# Patient Record
Sex: FEMALE | Race: WHITE | NOT HISPANIC OR LATINO | Employment: OTHER | URBAN - NONMETROPOLITAN AREA
[De-identification: names, ages, dates, MRNs, and addresses within clinical notes are randomized per-mention and may not be internally consistent; named-entity substitution may affect disease eponyms.]

---

## 2017-07-11 ENCOUNTER — OFFICE VISIT (OUTPATIENT)
Dept: OBSTETRICS AND GYNECOLOGY | Facility: CLINIC | Age: 67
End: 2017-07-11

## 2017-07-11 VITALS
WEIGHT: 176 LBS | DIASTOLIC BLOOD PRESSURE: 90 MMHG | SYSTOLIC BLOOD PRESSURE: 142 MMHG | BODY MASS INDEX: 34.55 KG/M2 | HEIGHT: 60 IN

## 2017-07-11 DIAGNOSIS — Z78.9 NONSMOKER: ICD-10-CM

## 2017-07-11 DIAGNOSIS — N30.01 ACUTE CYSTITIS WITH HEMATURIA: ICD-10-CM

## 2017-07-11 DIAGNOSIS — N95.2 VAGINAL ATROPHY: ICD-10-CM

## 2017-07-11 DIAGNOSIS — N90.4 LICHEN SCLEROSUS ET ATROPHICUS OF THE VULVA: Primary | ICD-10-CM

## 2017-07-11 PROCEDURE — 99203 OFFICE O/P NEW LOW 30 MIN: CPT | Performed by: OBSTETRICS & GYNECOLOGY

## 2017-07-11 RX ORDER — CLOBETASOL PROPIONATE 0.5 MG/G
CREAM TOPICAL 2 TIMES DAILY
Qty: 30 G | Refills: 1 | Status: SHIPPED | OUTPATIENT
Start: 2017-07-11 | End: 2021-06-17

## 2017-07-11 NOTE — PROGRESS NOTES
"[facility]  Pauline Molina  : 1950  MRN: [id number]  CSN: [id number]    History and Physical    Subjective   Pauline Molina is a 67 y.o. year old  who presents for consultation about surgery due to PMB.  The patient reports that she started noting \"spots of blood\" intermittently several months ago, but was never really sure where it was coming from.  She also reports a brown discharge that had gotten bothersome, but lately has been better.  Patient recalls a crampy feeling like when she had periods during the bleeding and discharge.  Pt does have bladder leakage that has gotten worse every year; leakage occurs even without warning.  She does have leakage with coughing or sneezing, and also has urge incontinence - reporting that she often has accidents at home.    Past Medical History:   Diagnosis Date   • Diabetes mellitus    • Hypertension    • Pseudotumor cerebri      Past Surgical History:   Procedure Laterality Date   •  SECTION     • CHOLECYSTECTOMY     • HERNIA REPAIR     • HYSTERECTOMY     • OOPHORECTOMY       Smoking status: Never Smoker                                                                 Smokeless status: Not on file                         Current Outpatient Prescriptions:   •  AMLODIPINE BESYLATE PO, Take  by mouth Daily., Disp: , Rfl:   •  losartan (COZAAR) 100 MG tablet, Take 100 mg by mouth daily., Disp: , Rfl:   •  metFORMIN (GLUCOPHAGE) 500 MG tablet, Take 500 mg by mouth 2 times daily (with meals)., Disp: , Rfl:   •  metoprolol tartrate (LOPRESSOR) 50 MG tablet, Take 50 mg by mouth 2 times daily., Disp: , Rfl:     No Known Allergies    Family History   Problem Relation Age of Onset   • Prostate cancer Father      Review of Systems   Respiratory: Negative for shortness of breath.    Cardiovascular: Negative for chest pain.   Gastrointestinal: Negative for abdominal pain, constipation and diarrhea.   Endocrine: Negative for cold intolerance and heat intolerance. " "        Objective   /90  Ht 60\" (152.4 cm)  Wt 176 lb (79.8 kg)  BMI 34.37 kg/m2    Physical Exam   Physical Exam   Constitutional: She is oriented to person, place, and time. She appears well-developed and well-nourished. No distress.   HENT:   Head: Normocephalic and atraumatic.   Neck: Normal range of motion.   Pulmonary/Chest: Effort normal.   Abdominal: Soft. There is no tenderness.   Genitourinary: Vagina normal. Pelvic exam was performed with patient supine. There is no tenderness or lesion on the right labia. There is no tenderness or lesion on the left labia. No tenderness or bleeding in the vagina. No signs of injury around the vagina. No vaginal discharge found.   Genitourinary Comments: Patient with mild discharge.  Good vault support, grade III cystocele, grade II rectocele.  Bimanual exam limited by body habitus.  External labial irritation due to pad usage vs. Lichen sclerosus.  Anus unremarkable.   Musculoskeletal: Normal range of motion.   Neurological: She is alert and oriented to person, place, and time.   Skin: Skin is warm and dry.   Psychiatric: She has a normal mood and affect. Her behavior is normal.   Nursing note and vitals reviewed.      Labs  Lab Results   Component Value Date     10/13/2016    HGB 15.3 10/13/2016    HCT 42.7 10/13/2016    WBC 6.20 10/13/2016     10/13/2016    K 4.5 10/13/2016    CL 99 10/13/2016    CO2 30.0 10/13/2016    BUN 19 10/13/2016    CREATININE 0.76 10/13/2016    GLUCOSE 162 (H) 10/13/2016    ALBUMIN 4.50 10/13/2016    CALCIUM 9.7 10/13/2016    AST 26 10/13/2016    ALT 27 10/13/2016    BILITOT 0.8 10/13/2016        Assessment & Plan  Pauline was seen today for vaginal bleeding.    Diagnoses and all orders for this visit:    Lichen sclerosus et atrophicus of the vulva: Empiric treatment for lichen sclerosus   -     clobetasol (TEMOVATE) 0.05 % cream; Apply  topically 2 (Two) Times a Day. Apply sparingly to affected area two nights each week, " external only    Vaginal atrophy: Patient sexually active only occasionally, but feel like brownish or dark discharge likely due to atrophy/mild trauma if they do have intercourse.  Scant today.  Also ruling out UTI as a possible cause.  Recent colonoscopy normal so not worried about rectal bleeding being a contributing factor.   -     conjugated estrogens (PREMARIN) 0.625 MG/GM vaginal cream; Insert  into the vagina Daily. 1/2 gram per vagina two nights weekly    Nonsmoker    BMI 34.0-34.9,adult    Acute cystitis with hematuria: UA just to rule out UTI as source of any bleeding.  Patient has had a very recent colonoscopy so not worried about rectal bleeding contributing to any brownish or dark discharge.  -     Urinalysis With / Microscopic If Indicated  -     Urine Culture      There are no diagnoses linked to this encounter.    Taniya Tomlinson MD  7/11/2017  10:18 AM

## 2017-07-13 LAB
APPEARANCE UR: CLEAR
BACTERIA UR CULT: NO GROWTH
BACTERIA UR CULT: NORMAL
BILIRUB UR QL STRIP: NEGATIVE
COLOR UR: YELLOW
GLUCOSE UR QL: NEGATIVE
HGB UR QL STRIP: NEGATIVE
KETONES UR QL STRIP: NEGATIVE
LEUKOCYTE ESTERASE UR QL STRIP: NEGATIVE
NITRITE UR QL STRIP: NEGATIVE
PH UR STRIP: 7 [PH] (ref 5–8)
PROT UR QL STRIP: NEGATIVE
SP GR UR: 1.01 (ref 1–1.03)
UROBILINOGEN UR STRIP-MCNC: NORMAL MG/DL

## 2018-06-13 ENCOUNTER — OFFICE VISIT (OUTPATIENT)
Dept: URGENT CARE | Age: 68
End: 2018-06-13
Payer: MEDICAID

## 2018-06-13 VITALS
WEIGHT: 181.4 LBS | TEMPERATURE: 98.2 F | BODY MASS INDEX: 35.61 KG/M2 | RESPIRATION RATE: 20 BRPM | HEIGHT: 60 IN | OXYGEN SATURATION: 96 % | DIASTOLIC BLOOD PRESSURE: 82 MMHG | SYSTOLIC BLOOD PRESSURE: 140 MMHG | HEART RATE: 71 BPM

## 2018-06-13 DIAGNOSIS — M25.511 ACUTE PAIN OF RIGHT SHOULDER: Primary | ICD-10-CM

## 2018-06-13 PROCEDURE — 1123F ACP DISCUSS/DSCN MKR DOCD: CPT | Performed by: NURSE PRACTITIONER

## 2018-06-13 PROCEDURE — G8417 CALC BMI ABV UP PARAM F/U: HCPCS | Performed by: NURSE PRACTITIONER

## 2018-06-13 PROCEDURE — 3017F COLORECTAL CA SCREEN DOC REV: CPT | Performed by: NURSE PRACTITIONER

## 2018-06-13 PROCEDURE — 1090F PRES/ABSN URINE INCON ASSESS: CPT | Performed by: NURSE PRACTITIONER

## 2018-06-13 PROCEDURE — G8399 PT W/DXA RESULTS DOCUMENT: HCPCS | Performed by: NURSE PRACTITIONER

## 2018-06-13 PROCEDURE — 1036F TOBACCO NON-USER: CPT | Performed by: NURSE PRACTITIONER

## 2018-06-13 PROCEDURE — 99213 OFFICE O/P EST LOW 20 MIN: CPT | Performed by: NURSE PRACTITIONER

## 2018-06-13 PROCEDURE — G8427 DOCREV CUR MEDS BY ELIG CLIN: HCPCS | Performed by: NURSE PRACTITIONER

## 2018-06-13 PROCEDURE — 4040F PNEUMOC VAC/ADMIN/RCVD: CPT | Performed by: NURSE PRACTITIONER

## 2018-06-13 RX ORDER — AMLODIPINE BESYLATE 5 MG/1
2.5 TABLET ORAL 2 TIMES DAILY
COMMUNITY
Start: 2018-05-07

## 2018-06-13 ASSESSMENT — ENCOUNTER SYMPTOMS
BACK PAIN: 0
RESPIRATORY NEGATIVE: 1

## 2019-01-10 ENCOUNTER — HOSPITAL ENCOUNTER (EMERGENCY)
Facility: HOSPITAL | Age: 69
Discharge: HOME | End: 2019-01-11
Attending: EMERGENCY MEDICINE | Admitting: EMERGENCY MEDICINE

## 2019-01-10 DIAGNOSIS — R00.2 PALPITATIONS: Primary | ICD-10-CM

## 2019-01-10 DIAGNOSIS — R07.9 CHEST PAIN, UNSPECIFIED TYPE: ICD-10-CM

## 2019-01-10 PROCEDURE — 85610 PROTHROMBIN TIME: CPT | Performed by: EMERGENCY MEDICINE

## 2019-01-10 PROCEDURE — 81001 URINALYSIS AUTO W/SCOPE: CPT | Performed by: EMERGENCY MEDICINE

## 2019-01-10 PROCEDURE — 99284 EMERGENCY DEPT VISIT MOD MDM: CPT

## 2019-01-10 PROCEDURE — 93005 ELECTROCARDIOGRAM TRACING: CPT | Performed by: EMERGENCY MEDICINE

## 2019-01-10 PROCEDURE — 36415 COLL VENOUS BLD VENIPUNCTURE: CPT

## 2019-01-10 PROCEDURE — 85025 COMPLETE CBC W/AUTO DIFF WBC: CPT | Performed by: EMERGENCY MEDICINE

## 2019-01-10 PROCEDURE — 93005 ELECTROCARDIOGRAM TRACING: CPT

## 2019-01-10 PROCEDURE — 80053 COMPREHEN METABOLIC PANEL: CPT | Performed by: EMERGENCY MEDICINE

## 2019-01-10 PROCEDURE — 83880 ASSAY OF NATRIURETIC PEPTIDE: CPT | Performed by: EMERGENCY MEDICINE

## 2019-01-10 PROCEDURE — 84484 ASSAY OF TROPONIN QUANT: CPT | Performed by: EMERGENCY MEDICINE

## 2019-01-10 PROCEDURE — 93010 ELECTROCARDIOGRAM REPORT: CPT | Performed by: INTERNAL MEDICINE

## 2019-01-11 ENCOUNTER — APPOINTMENT (OUTPATIENT)
Dept: GENERAL RADIOLOGY | Facility: HOSPITAL | Age: 69
End: 2019-01-11

## 2019-01-11 VITALS
OXYGEN SATURATION: 97 % | BODY MASS INDEX: 34.36 KG/M2 | WEIGHT: 175 LBS | RESPIRATION RATE: 17 BRPM | SYSTOLIC BLOOD PRESSURE: 140 MMHG | TEMPERATURE: 97.3 F | DIASTOLIC BLOOD PRESSURE: 80 MMHG | HEIGHT: 60 IN | HEART RATE: 70 BPM

## 2019-01-11 LAB
ALBUMIN SERPL-MCNC: 4.8 G/DL (ref 3.5–5)
ALBUMIN/GLOB SERPL: 1.7 G/DL (ref 1.1–2.5)
ALP SERPL-CCNC: 142 U/L (ref 24–120)
ALT SERPL W P-5'-P-CCNC: 34 U/L (ref 0–54)
ANION GAP SERPL CALCULATED.3IONS-SCNC: 14 MMOL/L (ref 4–13)
AST SERPL-CCNC: 27 U/L (ref 7–45)
BACTERIA UR QL AUTO: ABNORMAL /HPF
BASOPHILS # BLD AUTO: 0.04 10*3/MM3 (ref 0–0.2)
BASOPHILS NFR BLD AUTO: 0.5 % (ref 0–2)
BILIRUB SERPL-MCNC: 0.4 MG/DL (ref 0.1–1)
BILIRUB UR QL STRIP: NEGATIVE
BUN BLD-MCNC: 22 MG/DL (ref 5–21)
BUN/CREAT SERPL: 31 (ref 7–25)
CALCIUM SPEC-SCNC: 9.7 MG/DL (ref 8.4–10.4)
CHLORIDE SERPL-SCNC: 104 MMOL/L (ref 98–110)
CLARITY UR: CLEAR
CO2 SERPL-SCNC: 25 MMOL/L (ref 24–31)
COLOR UR: YELLOW
CREAT BLD-MCNC: 0.71 MG/DL (ref 0.5–1.4)
DEPRECATED RDW RBC AUTO: 39.8 FL (ref 40–54)
EOSINOPHIL # BLD AUTO: 0.16 10*3/MM3 (ref 0–0.7)
EOSINOPHIL NFR BLD AUTO: 2.1 % (ref 0–4)
ERYTHROCYTE [DISTWIDTH] IN BLOOD BY AUTOMATED COUNT: 12.2 % (ref 12–15)
GFR SERPL CREATININE-BSD FRML MDRD: 82 ML/MIN/1.73
GLOBULIN UR ELPH-MCNC: 2.9 GM/DL
GLUCOSE BLD-MCNC: 145 MG/DL (ref 70–100)
GLUCOSE UR STRIP-MCNC: NEGATIVE MG/DL
HCT VFR BLD AUTO: 45.3 % (ref 37–47)
HGB BLD-MCNC: 15.9 G/DL (ref 12–16)
HGB UR QL STRIP.AUTO: NEGATIVE
HOLD SPECIMEN: NORMAL
HOLD SPECIMEN: NORMAL
HYALINE CASTS UR QL AUTO: ABNORMAL /LPF
IMM GRANULOCYTES # BLD AUTO: 0.03 10*3/MM3 (ref 0–0.03)
IMM GRANULOCYTES NFR BLD AUTO: 0.4 % (ref 0–5)
INR PPP: 0.94 (ref 0.91–1.09)
KETONES UR QL STRIP: NEGATIVE
LEUKOCYTE ESTERASE UR QL STRIP.AUTO: ABNORMAL
LYMPHOCYTES # BLD AUTO: 2.48 10*3/MM3 (ref 0.72–4.86)
LYMPHOCYTES NFR BLD AUTO: 32.8 % (ref 15–45)
MCH RBC QN AUTO: 31.4 PG (ref 28–32)
MCHC RBC AUTO-ENTMCNC: 35.1 G/DL (ref 33–36)
MCV RBC AUTO: 89.5 FL (ref 82–98)
MONOCYTES # BLD AUTO: 0.83 10*3/MM3 (ref 0.19–1.3)
MONOCYTES NFR BLD AUTO: 11 % (ref 4–12)
NEUTROPHILS # BLD AUTO: 4.03 10*3/MM3 (ref 1.87–8.4)
NEUTROPHILS NFR BLD AUTO: 53.2 % (ref 39–78)
NITRITE UR QL STRIP: NEGATIVE
NRBC BLD AUTO-RTO: 0 /100 WBC (ref 0–0)
NT-PROBNP SERPL-MCNC: 309 PG/ML (ref 0–900)
PH UR STRIP.AUTO: 7 [PH] (ref 5–8)
PLATELET # BLD AUTO: 282 10*3/MM3 (ref 130–400)
PMV BLD AUTO: 10.8 FL (ref 6–12)
POTASSIUM BLD-SCNC: 4.3 MMOL/L (ref 3.5–5.3)
PROT SERPL-MCNC: 7.7 G/DL (ref 6.3–8.7)
PROT UR QL STRIP: NEGATIVE
PROTHROMBIN TIME: 12.8 SECONDS (ref 11.9–14.6)
RBC # BLD AUTO: 5.06 10*6/MM3 (ref 4.2–5.4)
RBC # UR: ABNORMAL /HPF
REF LAB TEST METHOD: ABNORMAL
SODIUM BLD-SCNC: 143 MMOL/L (ref 135–145)
SP GR UR STRIP: 1.01 (ref 1–1.03)
SQUAMOUS #/AREA URNS HPF: ABNORMAL /HPF
TROPONIN I SERPL-MCNC: <0.012 NG/ML (ref 0–0.03)
TROPONIN I SERPL-MCNC: <0.012 NG/ML (ref 0–0.03)
UROBILINOGEN UR QL STRIP: ABNORMAL
WBC NRBC COR # BLD: 7.57 10*3/MM3 (ref 4.8–10.8)
WBC UR QL AUTO: ABNORMAL /HPF
WHOLE BLOOD HOLD SPECIMEN: NORMAL
WHOLE BLOOD HOLD SPECIMEN: NORMAL

## 2019-01-11 PROCEDURE — 93010 ELECTROCARDIOGRAM REPORT: CPT | Performed by: INTERNAL MEDICINE

## 2019-01-11 PROCEDURE — 84484 ASSAY OF TROPONIN QUANT: CPT | Performed by: EMERGENCY MEDICINE

## 2019-01-11 PROCEDURE — 93005 ELECTROCARDIOGRAM TRACING: CPT | Performed by: EMERGENCY MEDICINE

## 2019-01-11 PROCEDURE — 71046 X-RAY EXAM CHEST 2 VIEWS: CPT

## 2019-01-11 NOTE — ED PROVIDER NOTES
Subjective   68-year-old woman presenting to the emergency department with palpitations.  Patient states that she has had palpitations on and off for years and was recently diagnosed with mitral valve prolapse, however over the past 2-3 weeks she feels like the palpitations have gotten worse.  Patient also notes that she's had difficulty sleeping because she feels the palpitations worse when she lies flat as well as some mild shortness of breath.  Patient states that she had some shortness of breath with exertion but overall is able to perform activities of daily living with ease.            Review of Systems   Cardiovascular: Positive for palpitations.   All other systems reviewed and are negative.      Past Medical History:   Diagnosis Date   • Diabetes mellitus (CMS/HCC)    • Hypertension    • Pseudotumor cerebri        No Known Allergies    Past Surgical History:   Procedure Laterality Date   •  SECTION     • CHOLECYSTECTOMY     • HERNIA REPAIR     • HYSTERECTOMY     • OOPHORECTOMY         Family History   Problem Relation Age of Onset   • Prostate cancer Father        Social History     Socioeconomic History   • Marital status:      Spouse name: Not on file   • Number of children: Not on file   • Years of education: Not on file   • Highest education level: Not on file   Tobacco Use   • Smoking status: Never Smoker   • Smokeless tobacco: Never Used   Substance and Sexual Activity   • Alcohol use: No   • Drug use: Defer           Objective   Physical Exam   Constitutional: She is oriented to person, place, and time. She appears well-developed and well-nourished.   HENT:   Head: Normocephalic and atraumatic.   Eyes: EOM are normal. Pupils are equal, round, and reactive to light.   Neck: Normal range of motion. Neck supple.   Cardiovascular: Normal rate, regular rhythm and normal heart sounds.   2+ pulses in upper and lower extremities   Pulmonary/Chest: Effort normal and breath sounds normal.    Abdominal: Soft. Bowel sounds are normal.   Musculoskeletal: Normal range of motion.   Neurological: She is alert and oriented to person, place, and time.   Skin: Skin is warm. Capillary refill takes less than 2 seconds.   Psychiatric: She has a normal mood and affect. Her behavior is normal. Thought content normal.   Nursing note and vitals reviewed.      ECG 12 Lead    Date/Time: 1/11/2019 12:18 AM  Performed by: Sae Wright MD  Authorized by: Sae Wright MD   Comments: Normal sinus rhythm with a sinus arrhythmia premature ventricular complexes and premature atrial complexes appreciated with some slight left atrial enlargement with normal KS and QTc.  No signs of ST or T-wave abnormalities that would be considered for an acute ischemic episode.                 ED Course  ED Course as of Jan 11 0256 Fri Jan 11, 2019   0252 Patient with palpitations are feeling well at this time.  Likely feeling possible PACs.  Possibly known mitral valve prolapse causing new onset of heart failure however patient relatively asymptomatic.  Patient has 2 troponins that are negative as well as a normal BNP and a normal chest x-ray.  Patient will be discharged and set up as an outpatient for stress test as well as have cardiology follow-up.  [AP]   0255 Heart score of 2  [AP]      ED Course User Index  [AP] Sae Wright MD                  [facility]      Final diagnoses:   Palpitations   Chest pain, unspecified type            Sae Wright MD  01/11/19 0256

## 2019-07-08 ENCOUNTER — APPOINTMENT (OUTPATIENT)
Dept: CT IMAGING | Age: 69
End: 2019-07-08
Payer: MEDICARE

## 2019-07-08 ENCOUNTER — HOSPITAL ENCOUNTER (OUTPATIENT)
Age: 69
Setting detail: OBSERVATION
Discharge: HOME | End: 2019-07-10
Attending: EMERGENCY MEDICINE | Admitting: INTERNAL MEDICINE
Payer: MEDICARE

## 2019-07-08 ENCOUNTER — APPOINTMENT (OUTPATIENT)
Dept: GENERAL RADIOLOGY | Age: 69
End: 2019-07-08
Payer: MEDICARE

## 2019-07-08 DIAGNOSIS — R93.0 ABNORMAL HEAD CT: ICD-10-CM

## 2019-07-08 DIAGNOSIS — R07.9 CHEST PAIN, UNSPECIFIED TYPE: Primary | ICD-10-CM

## 2019-07-08 LAB
ALBUMIN SERPL-MCNC: 4.4 G/DL (ref 3.5–5.2)
ALP BLD-CCNC: 125 U/L (ref 35–104)
ALT SERPL-CCNC: 22 U/L (ref 5–33)
ANION GAP SERPL CALCULATED.3IONS-SCNC: 15 MMOL/L (ref 7–19)
AST SERPL-CCNC: 24 U/L (ref 5–32)
BILIRUB SERPL-MCNC: 0.3 MG/DL (ref 0.2–1.2)
BUN BLDV-MCNC: 23 MG/DL (ref 8–23)
CALCIUM SERPL-MCNC: 9.7 MG/DL (ref 8.8–10.2)
CHLORIDE BLD-SCNC: 101 MMOL/L (ref 98–111)
CO2: 24 MMOL/L (ref 22–29)
CREAT SERPL-MCNC: 0.8 MG/DL (ref 0.5–0.9)
D DIMER: 0.39 UG/ML FEU (ref 0–0.48)
GFR NON-AFRICAN AMERICAN: >60
GLUCOSE BLD-MCNC: 185 MG/DL (ref 74–109)
HCT VFR BLD CALC: 45.3 % (ref 37–47)
HEMOGLOBIN: 15.4 G/DL (ref 12–16)
MCH RBC QN AUTO: 31.6 PG (ref 27–31)
MCHC RBC AUTO-ENTMCNC: 34 G/DL (ref 33–37)
MCV RBC AUTO: 93 FL (ref 81–99)
PDW BLD-RTO: 12.3 % (ref 11.5–14.5)
PLATELET # BLD: 247 K/UL (ref 130–400)
PMV BLD AUTO: 11 FL (ref 9.4–12.3)
POTASSIUM SERPL-SCNC: 4.3 MMOL/L (ref 3.5–5)
RBC # BLD: 4.87 M/UL (ref 4.2–5.4)
SODIUM BLD-SCNC: 140 MMOL/L (ref 136–145)
TOTAL PROTEIN: 7.3 G/DL (ref 6.6–8.7)
TROPONIN: <0.01 NG/ML (ref 0–0.03)
WBC # BLD: 7.1 K/UL (ref 4.8–10.8)

## 2019-07-08 PROCEDURE — 99285 EMERGENCY DEPT VISIT HI MDM: CPT | Performed by: EMERGENCY MEDICINE

## 2019-07-08 PROCEDURE — 93005 ELECTROCARDIOGRAM TRACING: CPT

## 2019-07-08 PROCEDURE — G0378 HOSPITAL OBSERVATION PER HR: HCPCS

## 2019-07-08 PROCEDURE — 70450 CT HEAD/BRAIN W/O DYE: CPT

## 2019-07-08 PROCEDURE — 84484 ASSAY OF TROPONIN QUANT: CPT

## 2019-07-08 PROCEDURE — 71046 X-RAY EXAM CHEST 2 VIEWS: CPT

## 2019-07-08 PROCEDURE — 6370000000 HC RX 637 (ALT 250 FOR IP): Performed by: EMERGENCY MEDICINE

## 2019-07-08 PROCEDURE — 80053 COMPREHEN METABOLIC PANEL: CPT

## 2019-07-08 PROCEDURE — 2580000003 HC RX 258: Performed by: NURSE PRACTITIONER

## 2019-07-08 PROCEDURE — 36415 COLL VENOUS BLD VENIPUNCTURE: CPT

## 2019-07-08 PROCEDURE — 99285 EMERGENCY DEPT VISIT HI MDM: CPT

## 2019-07-08 PROCEDURE — 85027 COMPLETE CBC AUTOMATED: CPT

## 2019-07-08 PROCEDURE — 85379 FIBRIN DEGRADATION QUANT: CPT

## 2019-07-08 PROCEDURE — 6370000000 HC RX 637 (ALT 250 FOR IP): Performed by: NURSE PRACTITIONER

## 2019-07-08 RX ORDER — SODIUM CHLORIDE 0.9 % (FLUSH) 0.9 %
10 SYRINGE (ML) INJECTION PRN
Status: DISCONTINUED | OUTPATIENT
Start: 2019-07-08 | End: 2019-07-10 | Stop reason: HOSPADM

## 2019-07-08 RX ORDER — AMLODIPINE BESYLATE 10 MG/1
10 TABLET ORAL DAILY
Status: DISCONTINUED | OUTPATIENT
Start: 2019-07-08 | End: 2019-07-10 | Stop reason: HOSPADM

## 2019-07-08 RX ORDER — LOSARTAN POTASSIUM 100 MG/1
100 TABLET ORAL DAILY
Status: DISCONTINUED | OUTPATIENT
Start: 2019-07-09 | End: 2019-07-10 | Stop reason: HOSPADM

## 2019-07-08 RX ORDER — SODIUM CHLORIDE 0.9 % (FLUSH) 0.9 %
10 SYRINGE (ML) INJECTION EVERY 12 HOURS SCHEDULED
Status: DISCONTINUED | OUTPATIENT
Start: 2019-07-08 | End: 2019-07-08 | Stop reason: SDUPTHER

## 2019-07-08 RX ORDER — METOPROLOL TARTRATE 50 MG/1
50 TABLET, FILM COATED ORAL DAILY
Status: DISCONTINUED | OUTPATIENT
Start: 2019-07-09 | End: 2019-07-10 | Stop reason: HOSPADM

## 2019-07-08 RX ORDER — ONDANSETRON 2 MG/ML
4 INJECTION INTRAMUSCULAR; INTRAVENOUS EVERY 6 HOURS PRN
Status: DISCONTINUED | OUTPATIENT
Start: 2019-07-08 | End: 2019-07-10 | Stop reason: HOSPADM

## 2019-07-08 RX ORDER — SODIUM CHLORIDE 0.9 % (FLUSH) 0.9 %
10 SYRINGE (ML) INJECTION PRN
Status: DISCONTINUED | OUTPATIENT
Start: 2019-07-08 | End: 2019-07-08 | Stop reason: SDUPTHER

## 2019-07-08 RX ORDER — ASPIRIN 325 MG
325 TABLET ORAL DAILY
Status: DISCONTINUED | OUTPATIENT
Start: 2019-07-09 | End: 2019-07-10 | Stop reason: HOSPADM

## 2019-07-08 RX ORDER — SODIUM CHLORIDE 0.9 % (FLUSH) 0.9 %
10 SYRINGE (ML) INJECTION EVERY 12 HOURS SCHEDULED
Status: DISCONTINUED | OUTPATIENT
Start: 2019-07-08 | End: 2019-07-10 | Stop reason: HOSPADM

## 2019-07-08 RX ORDER — ASPIRIN 325 MG
325 TABLET ORAL ONCE
Status: COMPLETED | OUTPATIENT
Start: 2019-07-08 | End: 2019-07-08

## 2019-07-08 RX ADMIN — Medication 10 ML: at 21:53

## 2019-07-08 RX ADMIN — ASPIRIN 325 MG: 325 TABLET, COATED ORAL at 16:42

## 2019-07-08 RX ADMIN — AMLODIPINE BESYLATE 10 MG: 10 TABLET ORAL at 21:53

## 2019-07-08 ASSESSMENT — ENCOUNTER SYMPTOMS
DIARRHEA: 0
ABDOMINAL PAIN: 0
VOMITING: 0
EYE PAIN: 0
SHORTNESS OF BREATH: 0

## 2019-07-08 NOTE — ED PROVIDER NOTES
difficulty, weakness and numbness. All other systems reviewed and are negative. A complete review of systems was performed and is negative except as noted above in the HPI. PAST MEDICAL HISTORY     Past Medical History:   Diagnosis Date    Anxiety     Headache(784.0)     Hypertension     Type II or unspecified type diabetes mellitus without mention of complication, not stated as uncontrolled          SURGICAL HISTORY       Past Surgical History:   Procedure Laterality Date    ABDOMEN SURGERY      to remove growth off of fallopian tube.  APPENDECTOMY  1973    Pt also had a mass on tubes she had removed with this surgery   [address]    CHOLECYSTECTOMY  2009    [address] 80 AND CURETTAGE OF UTERUS  2007?    w/Ablation? [address]  2009    HYSTERECTOMY  2008    total         CURRENT MEDICATIONS       Current Discharge Medication List      CONTINUE these medications which have NOT CHANGED    Details   amLODIPine (NORVASC) 5 MG tablet 10 mg      metformin (GLUCOPHAGE) 500 MG tablet Take 500 mg by mouth 2 times daily (with meals). losartan (COZAAR) 100 MG tablet Take 100 mg by mouth daily.       metoprolol (LOPRESSOR) 50 MG tablet Take 50 mg by mouth daily              ALLERGIES     Lactose intolerance (gi)    FAMILY HISTORY       Family History   Problem Relation Age of Onset    High Blood Pressure Mother     High Blood Pressure Father     High Blood Pressure Sister     High Blood Pressure Brother     Breast Cancer Maternal Grandmother           SOCIAL HISTORY       Social History     Socioeconomic History    Marital status:      Spouse name: None    Number of children: None    Years of education: None    Highest education level: None   Occupational History    None   Social Needs    Financial resource strain: None    Food insecurity:     Worry: None     Inability: None    Transportation needs:     Medical: None     Non-medical: None   Tobacco Use   

## 2019-07-08 NOTE — ED NOTES
Demetrice Galvin [address] [place] @ [phone] @ [address] [place] [address]  07/08/19 1851

## 2019-07-09 ENCOUNTER — APPOINTMENT (OUTPATIENT)
Dept: NUCLEAR MEDICINE | Age: 69
End: 2019-07-09
Payer: MEDICARE

## 2019-07-09 ENCOUNTER — APPOINTMENT (OUTPATIENT)
Dept: MRI IMAGING | Age: 69
End: 2019-07-09
Payer: MEDICARE

## 2019-07-09 LAB
ANION GAP SERPL CALCULATED.3IONS-SCNC: 14 MMOL/L (ref 7–19)
BUN BLDV-MCNC: 18 MG/DL (ref 8–23)
CALCIUM SERPL-MCNC: 8.9 MG/DL (ref 8.8–10.2)
CHLORIDE BLD-SCNC: 103 MMOL/L (ref 98–111)
CO2: 22 MMOL/L (ref 22–29)
CREAT SERPL-MCNC: 0.6 MG/DL (ref 0.5–0.9)
GFR NON-AFRICAN AMERICAN: >60
GLUCOSE BLD-MCNC: 121 MG/DL (ref 70–99)
GLUCOSE BLD-MCNC: 123 MG/DL (ref 74–109)
GLUCOSE BLD-MCNC: 150 MG/DL (ref 70–99)
GLUCOSE BLD-MCNC: 186 MG/DL (ref 70–99)
HCT VFR BLD CALC: 43.6 % (ref 37–47)
HEMOGLOBIN: 14.6 G/DL (ref 12–16)
LV EF: 58 %
LVEF MODALITY: NORMAL
MCH RBC QN AUTO: 30.6 PG (ref 27–31)
MCHC RBC AUTO-ENTMCNC: 33.5 G/DL (ref 33–37)
MCV RBC AUTO: 91.4 FL (ref 81–99)
PDW BLD-RTO: 12.1 % (ref 11.5–14.5)
PERFORMED ON: ABNORMAL
PLATELET # BLD: 230 K/UL (ref 130–400)
PMV BLD AUTO: 10.7 FL (ref 9.4–12.3)
POTASSIUM REFLEX MAGNESIUM: 3.9 MMOL/L (ref 3.5–5)
PRO-BNP: 257 PG/ML (ref 0–900)
RBC # BLD: 4.77 M/UL (ref 4.2–5.4)
SODIUM BLD-SCNC: 139 MMOL/L (ref 136–145)
TROPONIN: <0.01 NG/ML (ref 0–0.03)
TROPONIN: <0.01 NG/ML (ref 0–0.03)
WBC # BLD: 6.1 K/UL (ref 4.8–10.8)

## 2019-07-09 PROCEDURE — 6360000002 HC RX W HCPCS: Performed by: INTERNAL MEDICINE

## 2019-07-09 PROCEDURE — 97750 PHYSICAL PERFORMANCE TEST: CPT

## 2019-07-09 PROCEDURE — 96376 TX/PRO/DX INJ SAME DRUG ADON: CPT

## 2019-07-09 PROCEDURE — 6360000004 HC RX CONTRAST MEDICATION: Performed by: NURSE PRACTITIONER

## 2019-07-09 PROCEDURE — 2580000003 HC RX 258: Performed by: NURSE PRACTITIONER

## 2019-07-09 PROCEDURE — 83880 ASSAY OF NATRIURETIC PEPTIDE: CPT

## 2019-07-09 PROCEDURE — G0378 HOSPITAL OBSERVATION PER HR: HCPCS

## 2019-07-09 PROCEDURE — 97165 OT EVAL LOW COMPLEX 30 MIN: CPT

## 2019-07-09 PROCEDURE — A9577 INJ MULTIHANCE: HCPCS | Performed by: NURSE PRACTITIONER

## 2019-07-09 PROCEDURE — 84484 ASSAY OF TROPONIN QUANT: CPT

## 2019-07-09 PROCEDURE — 6370000000 HC RX 637 (ALT 250 FOR IP): Performed by: NURSE PRACTITIONER

## 2019-07-09 PROCEDURE — 82948 REAGENT STRIP/BLOOD GLUCOSE: CPT

## 2019-07-09 PROCEDURE — 36415 COLL VENOUS BLD VENIPUNCTURE: CPT

## 2019-07-09 PROCEDURE — 93017 CV STRESS TEST TRACING ONLY: CPT

## 2019-07-09 PROCEDURE — A9500 TC99M SESTAMIBI: HCPCS | Performed by: INTERNAL MEDICINE

## 2019-07-09 PROCEDURE — 80048 BASIC METABOLIC PNL TOTAL CA: CPT

## 2019-07-09 PROCEDURE — 3430000000 HC RX DIAGNOSTIC RADIOPHARMACEUTICAL: Performed by: INTERNAL MEDICINE

## 2019-07-09 PROCEDURE — 78452 HT MUSCLE IMAGE SPECT MULT: CPT

## 2019-07-09 PROCEDURE — 99219 PR INITIAL OBSERVATION CARE/DAY 50 MINUTES: CPT | Performed by: NEUROLOGICAL SURGERY

## 2019-07-09 PROCEDURE — 99205 OFFICE O/P NEW HI 60 MIN: CPT | Performed by: INTERNAL MEDICINE

## 2019-07-09 PROCEDURE — 6360000002 HC RX W HCPCS: Performed by: NURSE PRACTITIONER

## 2019-07-09 PROCEDURE — 93306 TTE W/DOPPLER COMPLETE: CPT

## 2019-07-09 PROCEDURE — 97161 PT EVAL LOW COMPLEX 20 MIN: CPT

## 2019-07-09 PROCEDURE — 85027 COMPLETE CBC AUTOMATED: CPT

## 2019-07-09 PROCEDURE — 6360000002 HC RX W HCPCS

## 2019-07-09 PROCEDURE — 96374 THER/PROPH/DIAG INJ IV PUSH: CPT

## 2019-07-09 PROCEDURE — 70553 MRI BRAIN STEM W/O & W/DYE: CPT

## 2019-07-09 RX ORDER — LORAZEPAM 2 MG/ML
INJECTION INTRAMUSCULAR
Status: COMPLETED
Start: 2019-07-09 | End: 2019-07-09

## 2019-07-09 RX ORDER — SODIUM CHLORIDE 0.9 % (FLUSH) 0.9 %
10 SYRINGE (ML) INJECTION PRN
Status: DISCONTINUED | OUTPATIENT
Start: 2019-07-09 | End: 2019-07-09 | Stop reason: SDUPTHER

## 2019-07-09 RX ORDER — SODIUM CHLORIDE 0.9 % (FLUSH) 0.9 %
10 SYRINGE (ML) INJECTION EVERY 12 HOURS SCHEDULED
Status: DISCONTINUED | OUTPATIENT
Start: 2019-07-09 | End: 2019-07-09 | Stop reason: SDUPTHER

## 2019-07-09 RX ORDER — LORAZEPAM 2 MG/ML
1 INJECTION INTRAMUSCULAR ONCE
Status: COMPLETED | OUTPATIENT
Start: 2019-07-09 | End: 2019-07-09

## 2019-07-09 RX ORDER — LORAZEPAM 2 MG/ML
0.5 INJECTION INTRAMUSCULAR ONCE
Status: COMPLETED | OUTPATIENT
Start: 2019-07-09 | End: 2019-07-09

## 2019-07-09 RX ADMIN — LORAZEPAM 0.5 MG: 2 INJECTION INTRAMUSCULAR; INTRAVENOUS at 14:12

## 2019-07-09 RX ADMIN — METOPROLOL TARTRATE 50 MG: 50 TABLET ORAL at 08:47

## 2019-07-09 RX ADMIN — GADOBENATE DIMEGLUMINE 16 ML: 529 INJECTION, SOLUTION INTRAVENOUS at 17:01

## 2019-07-09 RX ADMIN — AMLODIPINE BESYLATE 10 MG: 10 TABLET ORAL at 19:50

## 2019-07-09 RX ADMIN — ASPIRIN 325 MG: 325 TABLET, COATED ORAL at 08:47

## 2019-07-09 RX ADMIN — LORAZEPAM 1 MG: 2 INJECTION INTRAMUSCULAR; INTRAVENOUS at 16:00

## 2019-07-09 RX ADMIN — TETRAKIS(2-METHOXYISOBUTYLISOCYANIDE)COPPER(I) TETRAFLUOROBORATE 30 MILLICURIE: 1 INJECTION, POWDER, LYOPHILIZED, FOR SOLUTION INTRAVENOUS at 13:34

## 2019-07-09 RX ADMIN — REGADENOSON 0.4 MG: 0.08 INJECTION, SOLUTION INTRAVENOUS at 11:43

## 2019-07-09 RX ADMIN — Medication 10 ML: at 19:50

## 2019-07-09 RX ADMIN — LORAZEPAM 1 MG: 2 INJECTION INTRAMUSCULAR at 16:00

## 2019-07-09 RX ADMIN — TETRAKIS(2-METHOXYISOBUTYLISOCYANIDE)COPPER(I) TETRAFLUOROBORATE 10 MILLICURIE: 1 INJECTION, POWDER, LYOPHILIZED, FOR SOLUTION INTRAVENOUS at 13:33

## 2019-07-09 RX ADMIN — Medication 10 ML: at 08:47

## 2019-07-09 RX ADMIN — LOSARTAN POTASSIUM 100 MG: 100 TABLET ORAL at 08:47

## 2019-07-09 ASSESSMENT — ENCOUNTER SYMPTOMS
SHORTNESS OF BREATH: 0
NAUSEA: 0
RESPIRATORY NEGATIVE: 1
COUGH: 0
VOMITING: 0
GASTROINTESTINAL NEGATIVE: 1
DIARRHEA: 0
EYES NEGATIVE: 1

## 2019-07-09 NOTE — CONSULTS
tablet 50 mg, 50 mg, Oral, Daily, Tabby Gipson, APRN - NP    sodium chloride flush 0.9 % injection 10 mL, 10 mL, Intravenous, 2 times per day, Tabby Leonela, APRN - NP, 10 mL at 07/08/19 2153    sodium chloride flush 0.9 % injection 10 mL, 10 mL, Intravenous, PRN, Tabbyevelin Gipson, APRN - NP    magnesium hydroxide (MILK OF MAGNESIA) 400 MG/5ML suspension 30 mL, 30 mL, Oral, Daily PRN, Tabby Gipson, APRN - NP    ondansetron [facility]) injection 4 mg, 4 mg, Intravenous, Q6H PRN, Tabby Leonela, APRN - NP    enoxaparin (LOVENOX) injection 40 mg, 40 mg, Subcutaneous, Daily, Shira Abernathy, APRN - NP  Lactose intolerance (gi)    Social History  Social History     Tobacco Use   Smoking Status Never Smoker   Smokeless Tobacco Never Used     Social History     Substance and Sexual Activity   Alcohol Use No         Family History   Problem Relation Age of Onset    High Blood Pressure Mother     High Blood Pressure Father     High Blood Pressure Sister     High Blood Pressure Brother     Breast Cancer Maternal Grandmother          REVIEW OF SYSTEMS:  Constitutional: No fevers No chills  Neck:No stiffness  Respiratory: No shortness of breath  Cardiovascular: + chest pain No palpitations  Gastrointestinal: No abdominal pain    Genitourinary: No Dysuria  Neurological: + headache, no confusion    PHYSICAL EXAM:  Vitals:    07/09/19 0605   BP: (!) 144/91   Pulse: 72   Resp: 16   Temp: 97 °F (36.1 °C)   SpO2: 93%     Constitutional: The patient appears well-developed and well-nourished.    Eyes - conjunctiva normal.  Conjugate Gaze  Ear, nose, throat - No scars, masses, or lesions over external nose or ears, no atrophy of tongue  Neck-symmetric, no masses noted, no jugular vein distension  Respiration- chest wall appears symmetric, good expansion, normal effort without use of accessory muscles  Musculoskeletal - no significant wasting of muscles noted, no bony deformities  Extremities-no clubbing,
DLP: 769 mGy-cm. Automated exposure control was utilized. COMPARISON: No comparison study. FINDINGS: There are no hemorrhage, edema or mass effect. There may be a choroid plexus cyst in the right lateral ventricle. There is mild expansion of the lateral ventricle in this area. The ventricular system is nondilated. Vascular calcification is noted. No calvarial fracture is seen. The visualized paranasal sinuses and mastoid air cells are clear. 1. There are no hemorrhage, edema or mass effect. There are no acute findings. 2. There may be a choroid plexus cyst in the right lateral ventricle. The full report of this exam was immediately signed and available to the emergency room. The patient is currently in the emergency room. Signed by Dr Brad Whiting on 7/8/2019 5:31 PM      Assessment:  1. Complaints of chest arm and neck pain tonic in nature but worse the past few days unstable angina not excluded  2. Chronic fatigue  3. Chronic exertional dyspnea  4. Complaints of headaches  5. Hypertension  6. Diabetes mellitus type 2  7. Pseudotumor cerebri  8. Arnold-Chiari malformation  9. Anxiety/panic attacks  10. Mitral valve prolapse  11. 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 Recommend echocardiogram  2. Recommend pharmacologic stress testing with myocardial perfusion imaging further comments to follow  3.  I will be going out of town the next few days we will have one of my associates see her

## 2019-07-10 VITALS
DIASTOLIC BLOOD PRESSURE: 75 MMHG | TEMPERATURE: 97 F | SYSTOLIC BLOOD PRESSURE: 134 MMHG | HEIGHT: 60 IN | OXYGEN SATURATION: 93 % | HEART RATE: 69 BPM | RESPIRATION RATE: 16 BRPM | BODY MASS INDEX: 33.77 KG/M2 | WEIGHT: 172 LBS

## 2019-07-10 LAB
ANION GAP SERPL CALCULATED.3IONS-SCNC: 14 MMOL/L (ref 7–19)
BUN BLDV-MCNC: 29 MG/DL (ref 8–23)
CALCIUM SERPL-MCNC: 9.2 MG/DL (ref 8.8–10.2)
CHLORIDE BLD-SCNC: 100 MMOL/L (ref 98–111)
CO2: 24 MMOL/L (ref 22–29)
CREAT SERPL-MCNC: 0.9 MG/DL (ref 0.5–0.9)
EKG P AXIS: 43 DEGREES
EKG P AXIS: 50 DEGREES
EKG P AXIS: 59 DEGREES
EKG P-R INTERVAL: 166 MS
EKG P-R INTERVAL: 168 MS
EKG P-R INTERVAL: 182 MS
EKG Q-T INTERVAL: 368 MS
EKG Q-T INTERVAL: 390 MS
EKG Q-T INTERVAL: 416 MS
EKG QRS DURATION: 80 MS
EKG QRS DURATION: 82 MS
EKG QRS DURATION: 82 MS
EKG QTC CALCULATION (BAZETT): 385 MS
EKG QTC CALCULATION (BAZETT): 425 MS
EKG QTC CALCULATION (BAZETT): 430 MS
EKG T AXIS: 54 DEGREES
EKG T AXIS: 59 DEGREES
EKG T AXIS: 66 DEGREES
GFR NON-AFRICAN AMERICAN: >60
GLUCOSE BLD-MCNC: 106 MG/DL (ref 70–99)
GLUCOSE BLD-MCNC: 114 MG/DL (ref 70–99)
GLUCOSE BLD-MCNC: 132 MG/DL (ref 70–99)
GLUCOSE BLD-MCNC: 139 MG/DL (ref 70–99)
GLUCOSE BLD-MCNC: 161 MG/DL (ref 74–109)
HCT VFR BLD CALC: 44.1 % (ref 37–47)
HEMOGLOBIN: 14.9 G/DL (ref 12–16)
LV EF: 68 %
LV EF: 68 %
LVEF MODALITY: NORMAL
LVEF MODALITY: NORMAL
MCH RBC QN AUTO: 30.8 PG (ref 27–31)
MCHC RBC AUTO-ENTMCNC: 33.8 G/DL (ref 33–37)
MCV RBC AUTO: 91.3 FL (ref 81–99)
PDW BLD-RTO: 12.3 % (ref 11.5–14.5)
PERFORMED ON: ABNORMAL
PLATELET # BLD: 259 K/UL (ref 130–400)
PMV BLD AUTO: 10.8 FL (ref 9.4–12.3)
POTASSIUM SERPL-SCNC: 4.3 MMOL/L (ref 3.5–5)
RBC # BLD: 4.83 M/UL (ref 4.2–5.4)
SODIUM BLD-SCNC: 138 MMOL/L (ref 136–145)
WBC # BLD: 10.4 K/UL (ref 4.8–10.8)

## 2019-07-10 PROCEDURE — 99152 MOD SED SAME PHYS/QHP 5/>YRS: CPT | Performed by: INTERNAL MEDICINE

## 2019-07-10 PROCEDURE — 93005 ELECTROCARDIOGRAM TRACING: CPT

## 2019-07-10 PROCEDURE — 85027 COMPLETE CBC AUTOMATED: CPT

## 2019-07-10 PROCEDURE — 6370000000 HC RX 637 (ALT 250 FOR IP): Performed by: NURSE PRACTITIONER

## 2019-07-10 PROCEDURE — 93458 L HRT ARTERY/VENTRICLE ANGIO: CPT | Performed by: INTERNAL MEDICINE

## 2019-07-10 PROCEDURE — 82948 REAGENT STRIP/BLOOD GLUCOSE: CPT

## 2019-07-10 PROCEDURE — G0378 HOSPITAL OBSERVATION PER HR: HCPCS

## 2019-07-10 PROCEDURE — 2709999900 HC NON-CHARGEABLE SUPPLY

## 2019-07-10 PROCEDURE — 99024 POSTOP FOLLOW-UP VISIT: CPT | Performed by: INTERNAL MEDICINE

## 2019-07-10 PROCEDURE — 6360000002 HC RX W HCPCS

## 2019-07-10 PROCEDURE — C1760 CLOSURE DEV, VASC: HCPCS

## 2019-07-10 PROCEDURE — 6370000000 HC RX 637 (ALT 250 FOR IP): Performed by: NEUROLOGICAL SURGERY

## 2019-07-10 PROCEDURE — C1894 INTRO/SHEATH, NON-LASER: HCPCS

## 2019-07-10 PROCEDURE — 2580000003 HC RX 258: Performed by: NURSE PRACTITIONER

## 2019-07-10 PROCEDURE — 36415 COLL VENOUS BLD VENIPUNCTURE: CPT

## 2019-07-10 PROCEDURE — 6360000004 HC RX CONTRAST MEDICATION: Performed by: INTERNAL MEDICINE

## 2019-07-10 PROCEDURE — 80048 BASIC METABOLIC PNL TOTAL CA: CPT

## 2019-07-10 RX ORDER — ACETAZOLAMIDE 250 MG/1
250 TABLET ORAL DAILY
Status: DISCONTINUED | OUTPATIENT
Start: 2019-07-10 | End: 2019-07-10 | Stop reason: HOSPADM

## 2019-07-10 RX ORDER — ASPIRIN 325 MG
325 TABLET ORAL DAILY
Qty: 30 TABLET | Refills: 3 | Status: SHIPPED | OUTPATIENT
Start: 2019-07-11 | End: 2019-10-17

## 2019-07-10 RX ADMIN — LOSARTAN POTASSIUM 100 MG: 100 TABLET ORAL at 09:21

## 2019-07-10 RX ADMIN — IOPAMIDOL 76 ML: 612 INJECTION, SOLUTION INTRAVENOUS at 18:30

## 2019-07-10 RX ADMIN — AMLODIPINE BESYLATE 10 MG: 10 TABLET ORAL at 21:07

## 2019-07-10 RX ADMIN — METOPROLOL TARTRATE 50 MG: 50 TABLET ORAL at 09:21

## 2019-07-10 RX ADMIN — Medication 10 ML: at 09:24

## 2019-07-10 RX ADMIN — ACETAZOLAMIDE 250 MG: 250 TABLET ORAL at 21:07

## 2019-07-10 ASSESSMENT — ENCOUNTER SYMPTOMS
NAUSEA: 0
VOMITING: 0
COUGH: 0
DIARRHEA: 0
SHORTNESS OF BREATH: 0

## 2019-07-10 ASSESSMENT — PAIN SCALES - GENERAL
PAINLEVEL_OUTOF10: 0
PAINLEVEL_OUTOF10: 0

## 2019-07-10 NOTE — PROCEDURES
Cardiac Risk Profile -         Risk Factor Yes / No / Unknown       Gender Female   Cigarette Use No   Family History of Cardiovascular Disease Yes: high blood pressure   Diabetes Mellitus yes   Hypercholesteremia no   Hypertension yes        Prior Cardiac History -    7/9/19 lexiscan Positive for apical myocardial ischemia, EF 68%, 8% ischemic myocardium on stress, intermediate risk findings, AUC indication 16, AUC score 7, Constantine Thornton MD)   7/10/19  Cath mild CAD, normal LVFX    Indications for Cardiac Catheterization -  7/9/19 lexiscan Positive for apical myocardial ischemia, EF 68%, 8% ischemic myocardium on stress, intermediate risk findings, AUC indication 16, AUC score 7, (MD Linda) , Diagnostic Catheterization Appropriate Use Criteria Description, [facility] 2012;[id number]      Conscious Sedation Protocol Used During this Procedure -        Anesthesia: Moderate   Sedation: 2 mg Midazolam (Versed)  50 mcg Fentanyl   Start time: 18:12   Stop time: 18:24   ASA Class: III   Estimated blood loss < 5 ml           After obtaining informed written consent, the patient was brought to the catheterization laboratory where the right groin was prepared in the usual sterile fashion. The patient was monitored continuously with ECG, pulse oximetry, blood pressure monitoring and direct observation. Additionally, please review the \"Tea Hemodynamic Procedure Report\", which is generated electronically through the [employer]. This report includes additional details regarding this procedure including, but not limited to:     1. Patient Data,   2. Admission,   3. Procedure,   4. Hemodynamics,   5. Vital Signs,   6. Medications, including conscious sedation medications given during the procedure (as noted above),   7. Procedure Log,   8. Device Usage,   9. Signature Audit Hensley, and,   10. Signatures. It should be noted, that I sign the \"Signatures\" line electronically through the \"HPF Def Portals\" tab on my computer.       2%

## 2019-07-11 NOTE — PROGRESS NOTES
4 Eyes Skin Assessment    Lizz Vasquez is being assessed upon: Admission    I agree that I, Makayla Gtz, along with Cem Danielle RN have performed a thorough Head to Toe Skin Assessment on the patient. ALL assessment sites listed below have been assessed. Areas assessed by both nurses:     [x]   Head, Face, and Ears   [x]   Shoulders, Back, and Chest  [x]   Arms, Elbows, and Hands   [x]   Coccyx, Sacrum, and Ischium  [x]   Legs, Feet, and Heels    Does the Patient have Skin Breakdown?  No    Chito Prevention initiated: Yes  Wound Care Orders initiated: NA    [facility] nurse consulted for Pressure Injury (Stage 3,4, Unstageable, DTI, NWPT, and Complex wounds) and New or Established Ostomies: NA        Primary Nurse eSignature: Makayla Gtz RN on 7/8/2019 at 9:27 PM      Co-Signer eSignature: Electronically signed by Sami James RN on 7/9/19 at 4:56 AM
Consult for neurosurgery called. Dr. Daisy Gil notified of consult.
Pt received medication and is resting. No complaints at this time. Will continue to monitor.  Electronically signed by Robinson Guevara RN on 7/10/2019 at 9:55 PM
Sonia Vee arrived to room # 720-2. Presented with: CP/Headache  Mental Status: Patient is oriented, alert and coherent. Vitals:    07/08/19 2104   BP: (!) 168/92   Pulse: 62   Resp: 18   Temp: 96.7 °F (35.9 °C)   SpO2: 96%     Patient safety contract and falls prevention contract reviewed with patient Yes. Oriented Patient and Family to room. Call light within reach. Yes.   Needs, issues or concerns expressed at this time: no.      Electronically signed by Elaina Whitlock RN on 7/8/2019 at 9:26 PM
97.6 °F (36.4 °C) (Temporal)   Resp 16   Ht 5' (1.524 m)   Wt 172 lb (78 kg)   SpO2 93%   BMI 33.59 kg/m²     GENERAL - well developed and well nourished; is an active participant in this examination  HEENT -  PERRLA, Hearing appears normal, conjunctiva and lids are normal, ears and nose appear normal  NECK - no thyromegaly, no JVD, trachea is in the midline  CARDIOVASCULAR - PMI is in the left mid line clavicular position, Normal S1 and S2 with a grade 1/6 systolic murmur. No S3 or S4    PULMONARY - No respiratory distress. scattered wheezes and rales. Breath sounds in both  lung fields are Decreased  ABDOMEN  - soft, non tender, no rebound, no hepatomegaly or splenomegaly  MUSCULOSKELETAL  - Prone/Supine, digitals and nails are without clubbing or cyanosis  EXTREMITIES - traced edema  NEUROLOGIC - cranial nerves, II-XII, are normal  SKIN - turgor is normal, no rash  PSYCHIATRIC - normal mood and affect, alert and orientated x 3, judgement and insight appear appropriate      LABORATORY EVALUATION & TESTING:    I have personally reviewed and interpreted the results of the following diagnostic endeavors     CBC:   Recent Labs     07/09/19  0306 07/10/19  0212   WBC 6.1 10.4   HGB 14.6 14.9   HCT 43.6 44.1    259     BMP:   Recent Labs     07/09/19  0306 07/10/19  0211    138   K 3.9 4.3   CO2 22 24   BUN 18 29*   CREATININE 0.6 0.9   LABGLOM >60 >60   GLUCOSE 123* 161*     BNP: No results for input(s): BNP in the last 72 hours. PT/INR: No results for input(s): PROTIME, INR in the last 72 hours. APTT:No results for input(s): APTT in the last 72 hours.   CARDIAC ENZYMES:  Recent Labs     07/08/19  2159 07/09/19  0306 07/09/19  1348   TROPONINI <0.01 <0.01 <0.01     FASTING LIPID PANEL:No results found for: HDL, LDLDIRECT, LDLCALC, TRIG  LIVER PROFILE:  Recent Labs     07/08/19  1551   AST 24   ALT 22   LABALBU 4.4           Current Inpatient Medications:     acetaZOLAMIDE  250 mg Oral Daily   

## 2019-07-26 ENCOUNTER — HOSPITAL ENCOUNTER (OUTPATIENT)
Dept: ULTRASOUND IMAGING | Facility: HOSPITAL | Age: 69
Discharge: HOME | End: 2019-07-26
Admitting: INTERNAL MEDICINE

## 2019-07-26 ENCOUNTER — HOSPITAL ENCOUNTER (OUTPATIENT)
Dept: GENERAL RADIOLOGY | Facility: HOSPITAL | Age: 69
Discharge: HOME | End: 2019-07-26

## 2019-07-26 ENCOUNTER — TRANSCRIBE ORDERS (OUTPATIENT)
Dept: ADMINISTRATIVE | Facility: HOSPITAL | Age: 69
End: 2019-07-26

## 2019-07-26 DIAGNOSIS — M79.661 PAIN OF RIGHT LOWER LEG: ICD-10-CM

## 2019-07-26 DIAGNOSIS — M25.561 RIGHT KNEE PAIN, UNSPECIFIED CHRONICITY: ICD-10-CM

## 2019-07-26 DIAGNOSIS — I82.409 DEEP VEIN THROMBOSIS PROGRESSION (HCC): ICD-10-CM

## 2019-07-26 DIAGNOSIS — M25.561 RIGHT KNEE PAIN, UNSPECIFIED CHRONICITY: Primary | ICD-10-CM

## 2019-07-26 PROCEDURE — 73562 X-RAY EXAM OF KNEE 3: CPT

## 2019-07-26 PROCEDURE — 93971 EXTREMITY STUDY: CPT

## 2019-09-13 ENCOUNTER — OFFICE VISIT (OUTPATIENT)
Dept: CARDIOLOGY | Age: 69
End: 2019-09-13
Payer: MEDICARE

## 2019-09-13 VITALS
DIASTOLIC BLOOD PRESSURE: 84 MMHG | WEIGHT: 181 LBS | SYSTOLIC BLOOD PRESSURE: 126 MMHG | BODY MASS INDEX: 35.53 KG/M2 | OXYGEN SATURATION: 98 % | HEART RATE: 74 BPM | HEIGHT: 60 IN

## 2019-09-13 DIAGNOSIS — I10 ESSENTIAL HYPERTENSION: Primary | ICD-10-CM

## 2019-09-13 DIAGNOSIS — R00.2 HEART PALPITATIONS: ICD-10-CM

## 2019-09-13 PROCEDURE — 99214 OFFICE O/P EST MOD 30 MIN: CPT | Performed by: NURSE PRACTITIONER

## 2019-09-13 PROCEDURE — 0296T PR EXT ECG > 48HR TO 21 DAY RCRD W/CONECT INTL RCRD: CPT | Performed by: NURSE PRACTITIONER

## 2019-09-13 PROCEDURE — 93000 ELECTROCARDIOGRAM COMPLETE: CPT | Performed by: NURSE PRACTITIONER

## 2019-09-13 NOTE — PROGRESS NOTES
which are within normal limits. Will place 14-day ZIO patch. Yes Continue current medications:     Yes           2. Hypertension  Well Controlled   Blood pressure in the office today 126/84. No Continue current medications:    Yes           3. Diabetes Stable  managed by primary care provider. Patient is on metformin 500 mg twice daily. No Continue current medications: Yes                     Orders Placed This Encounter   Procedures    EKG 12 lead     Order Specific Question:   Reason for Exam?     Answer: Other    MN EXT ECG > 48HR TO 21 DAY RCRD W/CONECT INTL RCRD (Zio Recording)     No orders of the defined types were placed in this encounter. Discussed with patient. Return in about 4 weeks (around 10/11/2019) for Results . I greatly appreciate the opportunity to meet Donald Villanuevaman and your confidence in allowing me to participate in her cardiovascular care.     GAEL Singleton NP  9/13/2019 1:08 PM

## 2019-09-16 ENCOUNTER — TELEPHONE (OUTPATIENT)
Dept: NEUROLOGY | Age: 69
End: 2019-09-16

## 2019-10-03 ENCOUNTER — OFFICE VISIT (OUTPATIENT)
Dept: NEUROSURGERY | Age: 69
End: 2019-10-03
Payer: MEDICARE

## 2019-10-03 VITALS
OXYGEN SATURATION: 98 % | HEART RATE: 64 BPM | BODY MASS INDEX: 35.14 KG/M2 | DIASTOLIC BLOOD PRESSURE: 77 MMHG | SYSTOLIC BLOOD PRESSURE: 131 MMHG | HEIGHT: 60 IN | WEIGHT: 179 LBS

## 2019-10-03 DIAGNOSIS — R51.9 FREQUENT HEADACHES: ICD-10-CM

## 2019-10-03 DIAGNOSIS — G93.0 CHOROID PLEXUS CYST: Primary | ICD-10-CM

## 2019-10-03 PROCEDURE — 99214 OFFICE O/P EST MOD 30 MIN: CPT | Performed by: NEUROLOGICAL SURGERY

## 2019-10-03 RX ORDER — AMITRIPTYLINE HYDROCHLORIDE 25 MG/1
25 TABLET, FILM COATED ORAL NIGHTLY
Refills: 3 | COMMUNITY
Start: 2019-07-23 | End: 2021-10-26

## 2019-10-03 RX ORDER — CLOBETASOL PROPIONATE 0.5 MG/G
CREAM TOPICAL
COMMUNITY
Start: 2017-07-11 | End: 2021-04-26 | Stop reason: ALTCHOICE

## 2019-10-03 ASSESSMENT — ENCOUNTER SYMPTOMS
RESPIRATORY NEGATIVE: 1
GASTROINTESTINAL NEGATIVE: 1
EYES NEGATIVE: 1

## 2019-10-17 ENCOUNTER — OFFICE VISIT (OUTPATIENT)
Dept: CARDIOLOGY | Age: 69
End: 2019-10-17
Payer: MEDICARE

## 2019-10-17 VITALS
SYSTOLIC BLOOD PRESSURE: 138 MMHG | OXYGEN SATURATION: 98 % | BODY MASS INDEX: 35.53 KG/M2 | WEIGHT: 181 LBS | HEART RATE: 66 BPM | DIASTOLIC BLOOD PRESSURE: 88 MMHG | HEIGHT: 60 IN

## 2019-10-17 DIAGNOSIS — I10 ESSENTIAL HYPERTENSION: Primary | ICD-10-CM

## 2019-10-17 DIAGNOSIS — R53.83 FATIGUE, UNSPECIFIED TYPE: ICD-10-CM

## 2019-10-17 DIAGNOSIS — R00.2 PALPITATIONS: ICD-10-CM

## 2019-10-17 LAB — VITAMIN B-12: 778 PG/ML (ref 211–946)

## 2019-10-17 PROCEDURE — 99213 OFFICE O/P EST LOW 20 MIN: CPT | Performed by: NURSE PRACTITIONER

## 2019-11-08 DIAGNOSIS — R53.83 FATIGUE, UNSPECIFIED TYPE: Primary | ICD-10-CM

## 2019-11-19 DIAGNOSIS — R53.83 FATIGUE, UNSPECIFIED TYPE: ICD-10-CM

## 2019-11-19 LAB — TSH SERPL DL<=0.05 MIU/L-ACNC: 3.21 UIU/ML (ref 0.27–4.2)

## 2020-03-25 RX ORDER — AMLODIPINE BESYLATE 5 MG/1
5 TABLET ORAL DAILY
Qty: 30 TABLET | Refills: 11 | Status: SHIPPED | OUTPATIENT
Start: 2020-03-25 | End: 2021-03-22 | Stop reason: SDUPTHER

## 2020-05-14 RX ORDER — METOPROLOL TARTRATE 50 MG/1
50 TABLET, FILM COATED ORAL 2 TIMES DAILY
Qty: 180 TABLET | Refills: 1 | Status: SHIPPED | OUTPATIENT
Start: 2020-05-14 | End: 2021-05-14 | Stop reason: SDUPTHER

## 2020-06-08 RX ORDER — LOSARTAN POTASSIUM 100 MG/1
100 TABLET ORAL DAILY
Qty: 90 TABLET | Refills: 1 | Status: SHIPPED | OUTPATIENT
Start: 2020-06-08 | End: 2020-06-11 | Stop reason: SDUPTHER

## 2020-06-11 RX ORDER — LOSARTAN POTASSIUM 100 MG/1
100 TABLET ORAL DAILY
Qty: 90 TABLET | Refills: 1 | Status: SHIPPED | OUTPATIENT
Start: 2020-06-11 | End: 2020-12-09

## 2020-06-24 ENCOUNTER — TELEPHONE (OUTPATIENT)
Dept: GASTROENTEROLOGY | Facility: CLINIC | Age: 70
End: 2020-06-24

## 2020-07-16 ENCOUNTER — TELEPHONE (OUTPATIENT)
Dept: INTERNAL MEDICINE | Facility: CLINIC | Age: 70
End: 2020-07-16

## 2020-07-16 NOTE — TELEPHONE ENCOUNTER
She will need COVID-19 tested and negative prior to appointment.  Will need to schedule telephone visit/Doxy visit with us or be evaluated by Urgent Care for current symptoms.

## 2020-07-16 NOTE — TELEPHONE ENCOUNTER
Caller: Pauline Molina    Relationship to patient: Self    Best call back number: [phone]    Concerns or Questions if Applicable: PATIENT HAS AN APPOINTMENT ON 07/20/2020 AT 2:30 BUT HAS BEEN DEALING WITH SINUS/COLD SYMPTOMS    Travel screen questions: PATIENT IS EXPERIENCING:  - SORE THROAT  - RUNNY NOSE  - HEADACHE  - BODY ACHE  - FATIGUE  - DRAINAGE  - SHORTNESS OF BREATH  - COUGH    DOESN'T BELIEVE SHE HAS BEEN EXPOSED.

## 2020-07-16 NOTE — TELEPHONE ENCOUNTER
Informed patient that she will need COVID 19 test and negative results prior to appointment. Suggested scheduling telephone visit/doxy. Patient mentioned maybe being seen at Urgent Care for symptoms. She wanted her appointment canceled as well as spouses. I will notify  as well.

## 2020-07-17 ENCOUNTER — OFFICE VISIT (OUTPATIENT)
Age: 70
End: 2020-07-17

## 2020-07-17 VITALS — TEMPERATURE: 97.5 F | OXYGEN SATURATION: 97 % | HEART RATE: 74 BPM

## 2020-07-21 ENCOUNTER — TELEPHONE (OUTPATIENT)
Age: 70
End: 2020-07-21

## 2020-07-21 LAB
REPORT: NORMAL
SARS-COV-2: NOT DETECTED
THIS TEST SENT TO: NORMAL

## 2020-07-21 NOTE — TELEPHONE ENCOUNTER
Flori Langford MA   2020  4:47 PM       Patient verified . Patient notified of negative COVID19 test results and verbalized understanding. GAEL Max   2020  3:59 PM       Please call patient and notify them of negative covid result.

## 2020-12-09 RX ORDER — LOSARTAN POTASSIUM 100 MG/1
TABLET ORAL
Qty: 90 TABLET | Refills: 3 | Status: SHIPPED | OUTPATIENT
Start: 2020-12-09 | End: 2021-09-22 | Stop reason: SDUPTHER

## 2020-12-17 ENCOUNTER — OFFICE VISIT (OUTPATIENT)
Dept: INTERNAL MEDICINE | Facility: CLINIC | Age: 70
End: 2020-12-17

## 2020-12-17 VITALS
BODY MASS INDEX: 35.34 KG/M2 | SYSTOLIC BLOOD PRESSURE: 140 MMHG | DIASTOLIC BLOOD PRESSURE: 86 MMHG | HEART RATE: 82 BPM | WEIGHT: 180 LBS | TEMPERATURE: 97.3 F | OXYGEN SATURATION: 94 % | HEIGHT: 60 IN

## 2020-12-17 DIAGNOSIS — E78.1 HIGH TRIGLYCERIDES: ICD-10-CM

## 2020-12-17 DIAGNOSIS — M81.0 OSTEOPOROSIS, UNSPECIFIED OSTEOPOROSIS TYPE, UNSPECIFIED PATHOLOGICAL FRACTURE PRESENCE: ICD-10-CM

## 2020-12-17 DIAGNOSIS — E11.9 TYPE 2 DIABETES MELLITUS WITHOUT COMPLICATION, WITHOUT LONG-TERM CURRENT USE OF INSULIN (HCC): Primary | ICD-10-CM

## 2020-12-17 DIAGNOSIS — Z12.31 BREAST CANCER SCREENING BY MAMMOGRAM: ICD-10-CM

## 2020-12-17 DIAGNOSIS — Z12.12 ENCOUNTER FOR COLORECTAL CANCER SCREENING: ICD-10-CM

## 2020-12-17 DIAGNOSIS — I10 ESSENTIAL HYPERTENSION, BENIGN: ICD-10-CM

## 2020-12-17 DIAGNOSIS — Z11.59 NEED FOR HEPATITIS C SCREENING TEST: ICD-10-CM

## 2020-12-17 DIAGNOSIS — Z12.11 ENCOUNTER FOR COLORECTAL CANCER SCREENING: ICD-10-CM

## 2020-12-17 DIAGNOSIS — D51.0 PERNICIOUS ANEMIA: ICD-10-CM

## 2020-12-17 LAB — HBA1C MFR BLD: 8.2 %

## 2020-12-17 PROCEDURE — 1159F MED LIST DOCD IN RCRD: CPT | Performed by: NURSE PRACTITIONER

## 2020-12-17 PROCEDURE — 1170F FXNL STATUS ASSESSED: CPT | Performed by: NURSE PRACTITIONER

## 2020-12-17 PROCEDURE — 83036 HEMOGLOBIN GLYCOSYLATED A1C: CPT | Performed by: NURSE PRACTITIONER

## 2020-12-17 PROCEDURE — 1126F AMNT PAIN NOTED NONE PRSNT: CPT | Performed by: NURSE PRACTITIONER

## 2020-12-17 PROCEDURE — G0438 PPPS, INITIAL VISIT: HCPCS | Performed by: NURSE PRACTITIONER

## 2020-12-17 NOTE — PROGRESS NOTES
The ABCs of the Annual Wellness Visit  Initial Medicare Wellness Visit    Chief Complaint   Patient presents with   • Medicare Wellness-Initial Visit     Patient wanting b12 level checked.        Subjective   History of Present Illness:  Pauline Molina is a 70 y.o. female who presents for an Initial Medicare Wellness Visit. Will collect yearly labs today. Discussed diabetes and does not regularly check blood sugars. Patient self discontinued diabetic medicines and is taking OTC supplements by choice. Patient working on diet, however, admits to poor control most of the time, but is motivated to improve since A1C had not improved much since last follow up. Blood pressure controlled with current therapies. No side effects. Heart rate is sinus arrhythmia today, patient denies symptoms and had complete work up, including heart cath last year that ruled low risk for ischemia. Patient denies chest pain,activty intolerance, dyspnea, or PND. Is agreeable to Dexa bone scan and mammogram    HEALTH RISK ASSESSMENT    Recent Hospitalizations:  No hospitalization(s) within the last year.    Current Medical Providers:  Patient Care Team:  Jayla Null APRN as PCP - General (Nurse Practitioner)    Smoking Status:  Social History     Tobacco Use   Smoking Status Never Smoker   Smokeless Tobacco Never Used       Alcohol Consumption:  Social History     Substance and Sexual Activity   Alcohol Use No       Depression Screen:   PHQ-2/PHQ-9 Depression Screening 12/17/2020   Little interest or pleasure in doing things 0   Feeling down, depressed, or hopeless 0   Total Score 0       Fall Risk Screen:  MARYADI Fall Risk Assessment was completed, and patient is at LOW risk for falls.Assessment completed on:12/17/2020    Health Habits and Functional and Cognitive Screening:  Functional & Cognitive Status 12/17/2020   Do you have difficulty preparing food and eating? No   Do you have difficulty bathing yourself, getting dressed  or grooming yourself? No   Do you have difficulty using the toilet? No   Do you have difficulty moving around from place to place? No   Do you have trouble with steps or getting out of a bed or a chair? Yes   Current Diet Well Balanced Diet   Dental Exam Up to date   Eye Exam Up to date   Exercise (times per week) 7 times per week   Current Exercise Activities Include Housecleaning   Do you need help using the phone?  No   Are you deaf or do you have serious difficulty hearing?  No   Do you need help with transportation? No   Do you need help shopping? No   Do you need help preparing meals?  No   Do you need help with housework?  No   Do you need help with laundry? No   Do you need help taking your medications? No   Do you need help managing money? No   Do you ever drive or ride in a car without wearing a seat belt? No   Have you felt unusual stress, anger or loneliness in the last month? No   Who do you live with? Spouse   If you need help, do you have trouble finding someone available to you? No   Have you been bothered in the last four weeks by sexual problems? No   Do you have difficulty concentrating, remembering or making decisions? Yes         Does the patient have evidence of cognitive impairment? No    Asprin use counseling:Start ASA 81 mg daily     Age-appropriate Screening Schedule:  Refer to the list below for future screening recommendations based on patient's age, sex and/or medical conditions. Orders for these recommended tests are listed in the plan section. The patient has been provided with a written plan.    Health Maintenance   Topic Date Due   • COLONOSCOPY  1950   • TDAP/TD VACCINES (1 - Tdap) 01/14/1971   • ZOSTER VACCINE (1 of 2) 01/14/2002   • DIABETIC FOOT EXAM  07/11/2017   • MAMMOGRAM  07/11/2017   • HEMOGLOBIN A1C  07/11/2017   • DIABETIC EYE EXAM  07/11/2017   • URINE MICROALBUMIN  10/13/2017   • LIPID PANEL  12/17/2020   • DXA SCAN  12/17/2020   • INFLUENZA VACCINE  12/17/2021  (Originally 8/1/2020)          The following portions of the patient's history were reviewed and updated as appropriate: allergies, current medications, past family history, past medical history, past social history, past surgical history and problem list.    Outpatient Medications Prior to Visit   Medication Sig Dispense Refill   • amLODIPine (NORVASC) 5 MG tablet Take 1 tablet by mouth Daily. 30 tablet 11   • losartan (COZAAR) 100 MG tablet Take 1 tablet by mouth once daily 90 tablet 3   • metoprolol tartrate (LOPRESSOR) 50 MG tablet Take 1 tablet by mouth 2 (Two) Times a Day. (Patient taking differently: Take 50 mg by mouth Daily.) 180 tablet 1   • clobetasol (TEMOVATE) 0.05 % cream Apply  topically 2 (Two) Times a Day. Apply sparingly to affected area two nights each week, external only 30 g 1   • conjugated estrogens (PREMARIN) 0.625 MG/GM vaginal cream Insert  into the vagina Daily. 1/2 gram per vagina two nights weekly 30 g 3   • AMLODIPINE BESYLATE PO Take  by mouth Daily.     • aspirin 81 MG tablet Take 1 tablet by mouth Daily. 14 tablet 0   • metFORMIN (GLUCOPHAGE) 500 MG tablet Take 1 tablet by mouth 2 (Two) Times a Day. 60 tablet 5     No facility-administered medications prior to visit.        There is no problem list on file for this patient.      Advanced Care Planning:  ACP discussion was held with the patient during this visit. Patient has an advance directive (not in EMR), copy requested.    Review of Systems   Constitutional: Negative for activity change, appetite change, fatigue, fever and unexpected weight change.   HENT: Negative for congestion, ear discharge, ear pain, hearing loss, postnasal drip, rhinorrhea, sinus pressure, tinnitus, trouble swallowing and voice change.    Eyes: Negative for discharge and visual disturbance.   Respiratory: Negative for apnea, cough and shortness of breath.    Cardiovascular: Negative for chest pain, palpitations and leg swelling.   Gastrointestinal: Negative  "for abdominal pain, blood in stool, constipation and rectal pain.   Endocrine: Negative for cold intolerance, heat intolerance, polydipsia and polyphagia.   Genitourinary: Negative for decreased urine volume, difficulty urinating, frequency, hematuria and urgency.   Musculoskeletal: Negative for arthralgias, back pain, myalgias, neck pain and neck stiffness.   Skin: Negative for color change, rash and wound.   Allergic/Immunologic: Negative for environmental allergies.   Neurological: Negative for dizziness, speech difficulty, weakness, numbness and headaches.   Hematological: Negative for adenopathy. Does not bruise/bleed easily.   Psychiatric/Behavioral: Negative for agitation, confusion, decreased concentration and sleep disturbance. The patient is not nervous/anxious.        Compared to one year ago, the patient feels her physical health is the same.  Compared to one year ago, the patient feels her mental health is the same.    Reviewed chart for potential of high risk medication in the elderly: yes  Reviewed chart for potential of harmful drug interactions in the elderly:yes    Objective         Vitals:    12/17/20 1133   BP: 140/86   BP Location: Left arm   Pulse: 82   Temp: 97.3 °F (36.3 °C)   SpO2: 94%   Weight: 81.6 kg (180 lb)   Height: 152.4 cm (60\")   PainSc: 0-No pain       Body mass index is 35.15 kg/m².  Discussed the patient's BMI with her. The BMI is above average; BMI management plan is completed.    Physical Exam  Constitutional:       Appearance: Normal appearance. She is well-developed. She is morbidly obese.   HENT:      Head: Normocephalic and atraumatic.      Right Ear: Hearing, tympanic membrane, ear canal and external ear normal.      Left Ear: Hearing, tympanic membrane, ear canal and external ear normal.      Nose: Nose normal.      Mouth/Throat:      Lips: Pink.      Mouth: Mucous membranes are moist.      Pharynx: Oropharynx is clear. Uvula midline.   Eyes:      General: Lids are " normal. Lids are everted, no foreign bodies appreciated. Vision grossly intact.      Extraocular Movements: Extraocular movements intact.      Conjunctiva/sclera: Conjunctivae normal.      Pupils: Pupils are equal, round, and reactive to light.   Neck:      Musculoskeletal: Normal range of motion and neck supple.   Cardiovascular:      Rate and Rhythm: Normal rate and regular rhythm.  No extrasystoles are present.     Pulses: Normal pulses.      Heart sounds: Normal heart sounds.   Pulmonary:      Effort: Pulmonary effort is normal.      Breath sounds: Normal breath sounds.   Abdominal:      General: Abdomen is protuberant. Bowel sounds are normal.      Palpations: Abdomen is soft.      Tenderness: There is no abdominal tenderness.   Musculoskeletal: Normal range of motion.   Lymphadenopathy:      Cervical: No cervical adenopathy.   Skin:     General: Skin is warm and dry.      Capillary Refill: Capillary refill takes less than 2 seconds.   Neurological:      General: No focal deficit present.      Mental Status: She is alert and oriented to person, place, and time.      Deep Tendon Reflexes: Reflexes are normal and symmetric.   Psychiatric:         Attention and Perception: Attention normal.         Mood and Affect: Mood normal.         Speech: Speech normal.         Behavior: Behavior normal. Behavior is cooperative.         Cognition and Memory: Cognition and memory normal.         Lab Results   Component Value Date    HGBA1C 8.2 12/17/2020        Assessment/Plan   Medicare Risks and Personalized Health Plan  CMS Preventative Services Quick Reference  Breast Cancer/Mammogram Screening  Cardiovascular risk  Colon Cancer Screening  Dementia/Memory   Depression/Dysphoria  Diabetic Lab Screening   Fall Risk  Glaucoma Risk  Hearing Problem  Immunizations Discussed/Encouraged (specific immunizations; Td, adacel Tdap, Pneumococcal 23 and Shingrix )  Inadequate Social Support, Isolation, Loneliness, Lack of  Transportation, Financial Difficulties, or Caregiver Stress   Inactivity/Sedentary  Obesity/Overweight   Osteoprorosis Risk  Polypharmacy    The above risks/problems have been discussed with the patient.  Pertinent information has been shared with the patient in the After Visit Summary.  Follow up plans and orders are seen below in the Assessment/Plan Section.    Diagnoses and all orders for this visit:    1. Type 2 diabetes mellitus without complication, without long-term current use of insulin (CMS/HCC) (Primary)  -     POC Glycosylated Hemoglobin (Hb A1C)  -     MicroAlbumin, Urine, Random - Urine, Clean Catch    2. Osteoporosis, unspecified osteoporosis type, unspecified pathological fracture presence  -     Vitamin D 25 Hydroxy    3. Need for hepatitis C screening test  -     Hepatitis C Antibody    4. Essential hypertension, benign  -     Comprehensive Metabolic Panel  -     CBC & Differential  -     Uric Acid  -     Urinalysis With Microscopic If Indicated (No Culture) - Urine, Clean Catch  -     Magnesium    5. High triglycerides  -     Lipid Panel  -     TSH    6. Breast cancer screening by mammogram  -     Mammo Screening Digital Tomosynthesis Bilateral With CAD; Future    7. Encounter for colorectal cancer screening  -     Ambulatory Referral to Gastroenterology      Follow Up:  No follow-ups on file.     An After Visit Summary and PPPS were given to the patient.

## 2020-12-17 NOTE — PATIENT INSTRUCTIONS
Diabetes Mellitus and Nutrition, Adult  When you have diabetes (diabetes mellitus), it is very important to have healthy eating habits because your blood sugar (glucose) levels are greatly affected by what you eat and drink. Eating healthy foods in the appropriate amounts, at about the same times every day, can help you:  · Control your blood glucose.  · Lower your risk of heart disease.  · Improve your blood pressure.  · Reach or maintain a healthy weight.  Every person with diabetes is different, and each person has different needs for a meal plan. Your health care provider may recommend that you work with a diet and nutrition specialist (dietitian) to make a meal plan that is best for you. Your meal plan may vary depending on factors such as:  · The calories you need.  · The medicines you take.  · Your weight.  · Your blood glucose, blood pressure, and cholesterol levels.  · Your activity level.  · Other health conditions you have, such as heart or kidney disease.  How do carbohydrates affect me?  Carbohydrates, also called carbs, affect your blood glucose level more than any other type of food. Eating carbs naturally raises the amount of glucose in your blood. Carb counting is a method for keeping track of how many carbs you eat. Counting carbs is important to keep your blood glucose at a healthy level, especially if you use insulin or take certain oral diabetes medicines.  It is important to know how many carbs you can safely have in each meal. This is different for every person. Your dietitian can help you calculate how many carbs you should have at each meal and for each snack.  Foods that contain carbs include:  · Bread, cereal, rice, pasta, and crackers.  · Potatoes and corn.  · Peas, beans, and lentils.  · Milk and yogurt.  · Fruit and juice.  · Desserts, such as cakes, cookies, ice cream, and candy.  How does alcohol affect me?  Alcohol can cause a sudden decrease in blood glucose (hypoglycemia),  "especially if you use insulin or take certain oral diabetes medicines. Hypoglycemia can be a life-threatening condition. Symptoms of hypoglycemia (sleepiness, dizziness, and confusion) are similar to symptoms of having too much alcohol.  If your health care provider says that alcohol is safe for you, follow these guidelines:  · Limit alcohol intake to no more than 1 drink per day for nonpregnant women and 2 drinks per day for men. One drink equals 12 oz of beer, 5 oz of wine, or 1½ oz of hard liquor.  · Do not drink on an empty stomach.  · Keep yourself hydrated with water, diet soda, or unsweetened iced tea.  · Keep in mind that regular soda, juice, and other mixers may contain a lot of sugar and must be counted as carbs.  What are tips for following this plan?    Reading food labels  · Start by checking the serving size on the \"Nutrition Facts\" label of packaged foods and drinks. The amount of calories, carbs, fats, and other nutrients listed on the label is based on one serving of the item. Many items contain more than one serving per package.  · Check the total grams (g) of carbs in one serving. You can calculate the number of servings of carbs in one serving by dividing the total carbs by 15. For example, if a food has 30 g of total carbs, it would be equal to 2 servings of carbs.  · Check the number of grams (g) of saturated and trans fats in one serving. Choose foods that have low or no amount of these fats.  · Check the number of milligrams (mg) of salt (sodium) in one serving. Most people should limit total sodium intake to less than 2,300 mg per day.  · Always check the nutrition information of foods labeled as \"low-fat\" or \"nonfat\". These foods may be higher in added sugar or refined carbs and should be avoided.  · Talk to your dietitian to identify your daily goals for nutrients listed on the label.  Shopping  · Avoid buying canned, premade, or processed foods. These foods tend to be high in fat, sodium, " and added sugar.  · Shop around the outside edge of the grocery store. This includes fresh fruits and vegetables, bulk grains, fresh meats, and fresh dairy.  Cooking  · Use low-heat cooking methods, such as baking, instead of high-heat cooking methods like deep frying.  · Cook using healthy oils, such as olive, canola, or sunflower oil.  · Avoid cooking with butter, cream, or high-fat meats.  Meal planning  · Eat meals and snacks regularly, preferably at the same times every day. Avoid going long periods of time without eating.  · Eat foods high in fiber, such as fresh fruits, vegetables, beans, and whole grains. Talk to your dietitian about how many servings of carbs you can eat at each meal.  · Eat 4-6 ounces (oz) of lean protein each day, such as lean meat, chicken, fish, eggs, or tofu. One oz of lean protein is equal to:  ? 1 oz of meat, chicken, or fish.  ? 1 egg.  ? ¼ cup of tofu.  · Eat some foods each day that contain healthy fats, such as avocado, nuts, seeds, and fish.  Lifestyle  · Check your blood glucose regularly.  · Exercise regularly as told by your health care provider. This may include:  ? 150 minutes of moderate-intensity or vigorous-intensity exercise each week. This could be brisk walking, biking, or water aerobics.  ? Stretching and doing strength exercises, such as yoga or weightlifting, at least 2 times a week.  · Take medicines as told by your health care provider.  · Do not use any products that contain nicotine or tobacco, such as cigarettes and e-cigarettes. If you need help quitting, ask your health care provider.  · Work with a counselor or diabetes educator to identify strategies to manage stress and any emotional and social challenges.  Questions to ask a health care provider  · Do I need to meet with a diabetes educator?  · Do I need to meet with a dietitian?  · What number can I call if I have questions?  · When are the best times to check my blood glucose?  Where to find more  information:  · American Diabetes Association: diabetes.org  · Academy of Nutrition and Dietetics: www.eatright.org  · National [place] of Diabetes and Digestive and Kidney Diseases (NIH): www.niddk.nih.gov  Summary  · A healthy meal plan will help you control your blood glucose and maintain a healthy lifestyle.  · Working with a diet and nutrition specialist (dietitian) can help you make a meal plan that is best for you.  · Keep in mind that carbohydrates (carbs) and alcohol have immediate effects on your blood glucose levels. It is important to count carbs and to use alcohol carefully.  This information is not intended to replace advice given to you by your health care provider. Make sure you discuss any questions you have with your health care provider.  Document Revised: 2018 Document Reviewed: 2018  [employer] Patient Education ©  [employer]      Back Exercises  These exercises help to make your trunk and back strong. They also help to keep the lower back flexible. Doing these exercises can help to prevent back pain or lessen existing pain.  · If you have back pain, try to do these exercises 2-3 times each day or as told by your doctor.  · As you get better, do the exercises once each day. Repeat the exercises more often as told by your doctor.  · To stop back pain from coming back, do the exercises once each day, or as told by your doctor.  Exercises  Single knee to chest  Do these steps 3-5 times in a row for each le. Lie on your back on a firm bed or the floor with your legs stretched out.  2. Bring one knee to your chest.  3. Grab your knee or thigh with both hands and hold them it in place.  4. Pull on your knee until you feel a gentle stretch in your lower back or buttocks.  5. Keep doing the stretch for 10-30 seconds.  6. Slowly let go of your leg and straighten it.  Pelvic tilt  Do these steps 5-10 times in a row:  1. Lie on your back on a firm bed or the floor with your legs  stretched out.  2. Bend your knees so they point up to the ceiling. Your feet should be flat on the floor.  3. Tighten your lower belly (abdomen) muscles to press your lower back against the floor. This will make your tailbone point up to the ceiling instead of pointing down to your feet or the floor.  4. Stay in this position for 5-10 seconds while you gently tighten your muscles and breathe evenly.  Cat-cow  Do these steps until your lower back bends more easily:  1. Get on your hands and knees on a firm surface. Keep your hands under your shoulders, and keep your knees under your hips. You may put padding under your knees.  2. Let your head hang down toward your chest. Tighten (contract) the muscles in your belly. Point your tailbone toward the floor so your lower back becomes rounded like the back of a cat.  3. Stay in this position for 5 seconds.  4. Slowly lift your head. Let the muscles of your belly relax. Point your tailbone up toward the ceiling so your back forms a sagging arch like the back of a cow.  5. Stay in this position for 5 seconds.    Press-ups  Do these steps 5-10 times in a row:  1. Lie on your belly (face-down) on the floor.  2. Place your hands near your head, about shoulder-width apart.  3. While you keep your back relaxed and keep your hips on the floor, slowly straighten your arms to raise the top half of your body and lift your shoulders. Do not use your back muscles. You may change where you place your hands in order to make yourself more comfortable.  4. Stay in this position for 5 seconds.  5. Slowly return to lying flat on the floor.    Bridges  Do these steps 10 times in a row:  1. Lie on your back on a firm surface.  2. Bend your knees so they point up to the ceiling. Your feet should be flat on the floor. Your arms should be flat at your sides, next to your body.  3. Tighten your butt muscles and lift your butt off the floor until your waist is almost as high as your knees. If you  do not feel the muscles working in your butt and the back of your thighs, slide your feet 1-2 inches farther away from your butt.  4. Stay in this position for 3-5 seconds.  5. Slowly lower your butt to the floor, and let your butt muscles relax.  If this exercise is too easy, try doing it with your arms crossed over your chest.  Belly crunches  Do these steps 5-10 times in a row:  1. Lie on your back on a firm bed or the floor with your legs stretched out.  2. Bend your knees so they point up to the ceiling. Your feet should be flat on the floor.  3. Cross your arms over your chest.  4. Tip your chin a little bit toward your chest but do not bend your neck.  5. Tighten your belly muscles and slowly raise your chest just enough to lift your shoulder blades a tiny bit off of the floor. Avoid raising your body higher than that, because it can put too much stress on your low back.  6. Slowly lower your chest and your head to the floor.  Back lifts  Do these steps 5-10 times in a row:  1. Lie on your belly (face-down) with your arms at your sides, and rest your forehead on the floor.  2. Tighten the muscles in your legs and your butt.  3. Slowly lift your chest off of the floor while you keep your hips on the floor. Keep the back of your head in line with the curve in your back. Look at the floor while you do this.  4. Stay in this position for 3-5 seconds.  5. Slowly lower your chest and your face to the floor.  Contact a doctor if:  · Your back pain gets a lot worse when you do an exercise.  · Your back pain does not get better 2 hours after you exercise.  If you have any of these problems, stop doing the exercises. Do not do them again unless your doctor says it is okay.  Get help right away if:  · You have sudden, very bad back pain. If this happens, stop doing the exercises. Do not do them again unless your doctor says it is okay.  This information is not intended to replace advice given to you by your health care  provider. Make sure you discuss any questions you have with your health care provider.  Document Revised: 09/12/2019 Document Reviewed: 09/12/2019  [employer] Patient Education © 2020 [employer]

## 2020-12-18 LAB
25(OH)D3+25(OH)D2 SERPL-MCNC: 46.8 NG/ML (ref 30–100)
ALBUMIN SERPL-MCNC: 4.4 G/DL (ref 3.5–5.2)
ALBUMIN/GLOB SERPL: 1.6 G/DL
ALP SERPL-CCNC: 119 U/L (ref 39–117)
ALT SERPL-CCNC: 27 U/L (ref 1–33)
APPEARANCE UR: CLEAR
AST SERPL-CCNC: 25 U/L (ref 1–32)
BASOPHILS # BLD AUTO: 0.03 10*3/MM3 (ref 0–0.2)
BASOPHILS NFR BLD AUTO: 0.7 % (ref 0–1.5)
BILIRUB SERPL-MCNC: 0.7 MG/DL (ref 0–1.2)
BILIRUB UR QL STRIP: NEGATIVE
BUN SERPL-MCNC: 24 MG/DL (ref 8–23)
BUN/CREAT SERPL: 33.8 (ref 7–25)
CALCIUM SERPL-MCNC: 9.2 MG/DL (ref 8.6–10.5)
CHLORIDE SERPL-SCNC: 100 MMOL/L (ref 98–107)
CHOLEST SERPL-MCNC: 197 MG/DL (ref 0–200)
CO2 SERPL-SCNC: 26 MMOL/L (ref 22–29)
COLOR UR: YELLOW
CREAT SERPL-MCNC: 0.71 MG/DL (ref 0.57–1)
EOSINOPHIL # BLD AUTO: 0.17 10*3/MM3 (ref 0–0.4)
EOSINOPHIL NFR BLD AUTO: 3.7 % (ref 0.3–6.2)
ERYTHROCYTE [DISTWIDTH] IN BLOOD BY AUTOMATED COUNT: 13.4 % (ref 12.3–15.4)
GLOBULIN SER CALC-MCNC: 2.7 GM/DL
GLUCOSE SERPL-MCNC: 211 MG/DL (ref 65–99)
GLUCOSE UR QL: NEGATIVE
HCT VFR BLD AUTO: 44.1 % (ref 34–46.6)
HCV AB S/CO SERPL IA: <0.1 S/CO RATIO (ref 0–0.9)
HDLC SERPL-MCNC: 34 MG/DL (ref 40–60)
HGB BLD-MCNC: 14.9 G/DL (ref 12–15.9)
HGB UR QL STRIP: NEGATIVE
IMM GRANULOCYTES # BLD AUTO: 0.02 10*3/MM3 (ref 0–0.05)
IMM GRANULOCYTES NFR BLD AUTO: 0.4 % (ref 0–0.5)
KETONES UR QL STRIP: NEGATIVE
LDLC SERPL CALC-MCNC: 120 MG/DL (ref 0–100)
LEUKOCYTE ESTERASE UR QL STRIP: NEGATIVE
LYMPHOCYTES # BLD AUTO: 1.48 10*3/MM3 (ref 0.7–3.1)
LYMPHOCYTES NFR BLD AUTO: 32.1 % (ref 19.6–45.3)
MAGNESIUM SERPL-MCNC: 1.9 MG/DL (ref 1.6–2.4)
MCH RBC QN AUTO: 30.3 PG (ref 26.6–33)
MCHC RBC AUTO-ENTMCNC: 33.8 G/DL (ref 31.5–35.7)
MCV RBC AUTO: 89.6 FL (ref 79–97)
MICROALBUMIN UR-MCNC: 5 UG/ML
MONOCYTES # BLD AUTO: 0.51 10*3/MM3 (ref 0.1–0.9)
MONOCYTES NFR BLD AUTO: 11.1 % (ref 5–12)
NEUTROPHILS # BLD AUTO: 2.4 10*3/MM3 (ref 1.7–7)
NEUTROPHILS NFR BLD AUTO: 52 % (ref 42.7–76)
NITRITE UR QL STRIP: NEGATIVE
NRBC BLD AUTO-RTO: 0 /100 WBC (ref 0–0.2)
PH UR STRIP: 7.5 [PH] (ref 5–8)
PLATELET # BLD AUTO: 258 10*3/MM3 (ref 140–450)
POTASSIUM SERPL-SCNC: 4.6 MMOL/L (ref 3.5–5.2)
PROT SERPL-MCNC: 7.1 G/DL (ref 6–8.5)
PROT UR QL STRIP: NEGATIVE
RBC # BLD AUTO: 4.92 10*6/MM3 (ref 3.77–5.28)
SODIUM SERPL-SCNC: 136 MMOL/L (ref 136–145)
SP GR UR: 1.01 (ref 1–1.03)
TRIGL SERPL-MCNC: 246 MG/DL (ref 0–150)
TSH SERPL DL<=0.005 MIU/L-ACNC: 2.16 UIU/ML (ref 0.27–4.2)
URATE SERPL-MCNC: 6.6 MG/DL (ref 2.4–5.7)
UROBILINOGEN UR STRIP-MCNC: NORMAL MG/DL
VIT B12 SERPL-MCNC: 518 PG/ML (ref 211–946)
VLDLC SERPL CALC-MCNC: 43 MG/DL (ref 5–40)
WBC # BLD AUTO: 4.61 10*3/MM3 (ref 3.4–10.8)

## 2020-12-18 RX ORDER — METFORMIN HYDROCHLORIDE 500 MG/1
500 TABLET, EXTENDED RELEASE ORAL
Qty: 30 TABLET | Refills: 3 | Status: SHIPPED | OUTPATIENT
Start: 2020-12-18 | End: 2021-01-19 | Stop reason: SDUPTHER

## 2021-01-19 RX ORDER — METFORMIN HYDROCHLORIDE 500 MG/1
500 TABLET, EXTENDED RELEASE ORAL
Qty: 90 TABLET | Refills: 3 | Status: SHIPPED | OUTPATIENT
Start: 2021-01-19 | End: 2021-10-26

## 2021-02-11 ENCOUNTER — PATIENT OUTREACH (OUTPATIENT)
Dept: PHARMACY | Facility: HOSPITAL | Age: 71
End: 2021-02-11

## 2021-02-11 NOTE — OUTREACH NOTE
I attempted to call patient today regarding medication adherence to metformin. No answer. I will try again at a later date.    Brandy Beckwith PharmD  02/11/21    Medication Adherence Call    Patient was called today to discuss medication adherence with metformin, as the patient was identified as having care opportunities.     The patient did not answer. I will try again at a later date.    Brandy Beckwith PharmD  02/22/21    Medication Adherence Call    Patient was called for a third time today to discuss medication adherence, as the patient was identified as having care opportunities.     The patient did not answer. There is nothing else I can do at this time.    Brandy Beckwith PharmD  03/05/21

## 2021-02-17 ENCOUNTER — TELEPHONE (OUTPATIENT)
Dept: INTERNAL MEDICINE | Facility: CLINIC | Age: 71
End: 2021-02-17

## 2021-02-17 NOTE — TELEPHONE ENCOUNTER
I need to recheck her lipid panel in May if she would want to wait. I would let her know that her insurance might not cover the cost if she wishes to complete alone. Can discuss cost with management if interested.

## 2021-02-17 NOTE — TELEPHONE ENCOUNTER
I know cannot have done for free thru [facility] unless having other lab testing at the same time.  Can be ordered and billed to insurance if she wants I think, but didn't know if you wished to order or not.

## 2021-02-17 NOTE — TELEPHONE ENCOUNTER
Caller: Pauline Molina    Relationship to patient: Self    Best call back number: [phone]     Patient is needing: patient would like to have an order sent to lab for covid antibody test. Please call if ordered.

## 2021-03-15 ENCOUNTER — TELEPHONE (OUTPATIENT)
Dept: INTERNAL MEDICINE | Facility: CLINIC | Age: 71
End: 2021-03-15

## 2021-03-22 ENCOUNTER — OFFICE VISIT (OUTPATIENT)
Dept: INTERNAL MEDICINE | Facility: CLINIC | Age: 71
End: 2021-03-22

## 2021-03-22 VITALS
SYSTOLIC BLOOD PRESSURE: 134 MMHG | WEIGHT: 178 LBS | DIASTOLIC BLOOD PRESSURE: 70 MMHG | TEMPERATURE: 98.2 F | OXYGEN SATURATION: 94 % | BODY MASS INDEX: 34.95 KG/M2 | HEIGHT: 60 IN | HEART RATE: 82 BPM

## 2021-03-22 DIAGNOSIS — E11.9 TYPE 2 DIABETES MELLITUS WITHOUT COMPLICATION, WITHOUT LONG-TERM CURRENT USE OF INSULIN (HCC): Primary | ICD-10-CM

## 2021-03-22 DIAGNOSIS — H65.01 NON-RECURRENT ACUTE SEROUS OTITIS MEDIA OF RIGHT EAR: ICD-10-CM

## 2021-03-22 DIAGNOSIS — I10 ESSENTIAL HYPERTENSION: ICD-10-CM

## 2021-03-22 PROBLEM — Z78.0 POSTMENOPAUSAL STATUS: Status: ACTIVE | Noted: 2021-03-22

## 2021-03-22 PROBLEM — Z86.010 HISTORY OF COLON POLYPS: Status: ACTIVE | Noted: 2021-03-22

## 2021-03-22 PROBLEM — Z86.0100 HISTORY OF COLON POLYPS: Status: ACTIVE | Noted: 2021-03-22

## 2021-03-22 PROBLEM — Z13.31 NEGATIVE DEPRESSION SCREENING: Status: ACTIVE | Noted: 2021-03-22

## 2021-03-22 PROBLEM — R93.1 ABNORMAL NUCLEAR CARDIAC IMAGING TEST: Status: ACTIVE | Noted: 2021-03-22

## 2021-03-22 PROBLEM — J30.9 ALLERGIC RHINITIS, MILD: Status: ACTIVE | Noted: 2021-03-22

## 2021-03-22 PROBLEM — Z91.81 AT LOW RISK FOR FALL: Status: ACTIVE | Noted: 2021-03-22

## 2021-03-22 PROBLEM — Q07.00 ARNOLD-CHIARI DEFORMITY: Status: ACTIVE | Noted: 2021-03-22

## 2021-03-22 PROBLEM — E78.1 HIGH TRIGLYCERIDES: Status: ACTIVE | Noted: 2021-03-22

## 2021-03-22 PROBLEM — M25.50 MULTIPLE JOINT PAIN: Status: ACTIVE | Noted: 2021-03-22

## 2021-03-22 PROBLEM — I34.1 MITRAL VALVE PROLAPSE: Status: ACTIVE | Noted: 2021-03-22

## 2021-03-22 PROBLEM — R00.2 PALPITATION: Status: ACTIVE | Noted: 2021-03-22

## 2021-03-22 PROBLEM — R51.9 HEADACHE: Status: ACTIVE | Noted: 2021-03-22

## 2021-03-22 PROBLEM — Z79.84 DIABETES MELLITUS TREATED WITH ORAL MEDICATION (HCC): Status: ACTIVE | Noted: 2021-03-22

## 2021-03-22 PROBLEM — K76.0 FATTY LIVER DISEASE, NONALCOHOLIC: Status: ACTIVE | Noted: 2021-03-22

## 2021-03-22 LAB — HBA1C MFR BLD: 7.6 %

## 2021-03-22 PROCEDURE — 99213 OFFICE O/P EST LOW 20 MIN: CPT | Performed by: NURSE PRACTITIONER

## 2021-03-22 PROCEDURE — 83036 HEMOGLOBIN GLYCOSYLATED A1C: CPT | Performed by: NURSE PRACTITIONER

## 2021-03-22 RX ORDER — AMLODIPINE BESYLATE 2.5 MG/1
2.5 TABLET ORAL DAILY
Qty: 30 TABLET | Refills: 5
Start: 2021-03-22 | End: 2021-06-17

## 2021-03-22 RX ORDER — AZITHROMYCIN 250 MG/1
TABLET, FILM COATED ORAL
Qty: 6 TABLET | Refills: 0 | Status: SHIPPED | OUTPATIENT
Start: 2021-03-22 | End: 2021-04-21

## 2021-03-22 NOTE — PROGRESS NOTES
"Chief Complaint  Diabetes (a1c-7.6) and Sinusitis (Started over the weekend.Nasal congestion/headhace, cough due to drainage, fluid on ears possibly.)    Subjective          Pauline Molina presents to [facility]  Mrs. Molina is a pleasant 70 yo female who presents for diabetes follow up and to discuss sinusitis symptoms. Patient reports she has been working on diet and better monitoring of her blood sugars. She did not bring blood sugars log for my review, but states range between 130-180 most days. She denies blood sugar >200. Patient reports trying to avoid concentrated sweet and carbohydrates.     Pauline states that this time of the year she has issues with allergies and gets sinusitis or ear infection most years. She does not regularly take allergy medicine and prefers natural remedies. Patient has been using nasal saline and cooking with raw garlic. Patient does occasionally take claritin if symptoms were moderate to severe. She reports ear pain, nasal and sinus congestion and headache started Friday. She denies change in taste or smell. She denies chills, fatgiue, muscle aches, or fever. She denies close contact with COVID-19 positive person(s).       Objective   Vital Signs:   /70 (BP Location: Left arm)   Pulse 82   Temp 98.2 °F (36.8 °C)   Ht 152.4 cm (60\")   Wt 80.7 kg (178 lb)   SpO2 94%   BMI 34.76 kg/m²     Physical Exam  Vitals and nursing note reviewed.   Constitutional:       Appearance: She is well-developed.   HENT:      Head: Normocephalic and atraumatic.      Right Ear: External ear normal. Tenderness present. No drainage. Tympanic membrane is bulging.      Left Ear: External ear normal. No drainage. Tympanic membrane is bulging.      Nose: Congestion and rhinorrhea present. Rhinorrhea is clear.      Right Sinus: No frontal sinus tenderness.      Left Sinus: No frontal sinus tenderness.   Eyes:      General: Lids are normal. Lids are everted, no foreign " bodies appreciated.      Extraocular Movements: Extraocular movements intact.      Conjunctiva/sclera: Conjunctivae normal.      Pupils: Pupils are equal, round, and reactive to light.   Neck:      Thyroid: No thyromegaly.   Cardiovascular:      Rate and Rhythm: Normal rate. Rhythm regularly irregular.      Pulses: Normal pulses.      Heart sounds: Murmur heard.   Systolic murmur is present with a grade of 2/6.     Pulmonary:      Effort: Pulmonary effort is normal.      Breath sounds: Normal breath sounds.   Abdominal:      General: Abdomen is protuberant. Bowel sounds are normal.      Palpations: Abdomen is soft.      Tenderness: There is no abdominal tenderness.   Musculoskeletal:      Cervical back: Normal range of motion and neck supple.      Right lower le+ Edema present.      Left lower le+ Edema present.   Lymphadenopathy:      Cervical: No cervical adenopathy.   Skin:     General: Skin is warm and dry.   Neurological:      Mental Status: She is alert and oriented to person, place, and time.   Psychiatric:         Behavior: Behavior normal. Behavior is cooperative.         Thought Content: Thought content normal.        Result Review :   The following data was reviewed by: ARNALDO Sher on 2021:  Common labs    Common Labsle 12/17/20 12/17/20 12/17/20 12/17/20 12/17/20 12/17/20 3/22/21    1101 1101 1101 1101 1101 1146    Glucose  211 (A)        BUN  24 (A)        Creatinine  0.71        eGFR Non  Am  82        eGFR African Am  99        Sodium  136        Potassium  4.6        Chloride  100        Calcium  9.2        Total Protein  7.1        Albumin  4.40        Total Bilirubin  0.7        Alkaline Phosphatase  119 (A)        AST (SGOT)  25        ALT (SGPT)  27        WBC    4.61      Hemoglobin    14.9      Hematocrit    44.1      Platelets    258      Total Cholesterol   197       Triglycerides   246 (A)       HDL Cholesterol   34 (A)       LDL Cholesterol    120 (A)        Hemoglobin A1C      8.2 7.6   Microalbumin, Urine 5.0         Uric Acid     6.6 (A)     (A) Abnormal value       Comments are available for some flowsheets but are not being displayed.                Assessment and Plan    Diagnoses and all orders for this visit:    1. Type 2 diabetes mellitus without complication, without long-term current use of insulin (CMS/formerly [facility]) (Primary)  -     POC Glycosylated Hemoglobin (Hb A1C)    2. Non-recurrent acute serous otitis media of right ear  -     azithromycin (Zithromax Z-Alexandre) 250 MG tablet; Take 2 tablets by mouth the first day, then 1 tablet daily for 4 days.  Dispense: 6 tablet; Refill: 0    3. Essential hypertension  -     amLODIPine (NORVASC) 2.5 MG tablet; Take 1 tablet by mouth Daily.  Dispense: 30 tablet; Refill: 5      I spent 20 minutes caring for Pauline on this date of service. This time includes time spent by me in the following activities:preparing for the visit, reviewing tests, obtaining and/or reviewing a separately obtained history, performing a medically appropriate examination and/or evaluation , counseling and educating the patient/family/caregiver, ordering medications, tests, or procedures, documenting information in the medical record and independently interpreting results and communicating that information with the patient/family/caregiver     Follow Up   Return if symptoms worsen or fail to improve.     Patient's a1C has improved.  We will continue Metformin 500 daily and diet/exercise changes.  Will consider increase of Metformin in 3 to 6 months depending on blood sugar logs.  Advised patient to check blood sugars daily and to bring follow-up for assessment.     She is requesting to decrease amlodipine related to ankle swelling. She will recheck her blood pressure daily and will call the office in 2 weeks with blood pressure readings.  I did advise her blood pressure readings were greater than 140/90 she will need to resume 5 mg of amlodipine.   Patient's heart rate is irregularly irregular and she reports that she is supposed to see her cardiologist 2 months ago but had not reschedule the appointment she months.  I gave her the contact information and the cardiologist she was scheduled to see at [facility].  Advised her to call today to reschedule her appointment.  She is asymptomatic for cardiac complaints today.    Patient has right ear infection and may be the start of bacterial sinusitis.  Advised her to start azithromycin and use Mucinex DM to thin secretions.  Also advised to increase her hydration and to use her saline nasal spray.  No issue with her using garlic if management of symptoms.      Patient was given instructions and counseling regarding her condition or for health maintenance advice. Please see specific information pulled into the AVS if appropriate.

## 2021-03-22 NOTE — PATIENT INSTRUCTIONS
Diabetes Mellitus and Nutrition, Adult  When you have diabetes, or diabetes mellitus, it is very important to have healthy eating habits because your blood sugar (glucose) levels are greatly affected by what you eat and drink. Eating healthy foods in the right amounts, at about the same times every day, can help you:  · Control your blood glucose.  · Lower your risk of heart disease.  · Improve your blood pressure.  · Reach or maintain a healthy weight.  What can affect my meal plan?  Every person with diabetes is different, and each person has different needs for a meal plan. Your health care provider may recommend that you work with a dietitian to make a meal plan that is best for you. Your meal plan may vary depending on factors such as:  · The calories you need.  · The medicines you take.  · Your weight.  · Your blood glucose, blood pressure, and cholesterol levels.  · Your activity level.  · Other health conditions you have, such as heart or kidney disease.  How do carbohydrates affect me?  Carbohydrates, also called carbs, affect your blood glucose level more than any other type of food. Eating carbs naturally raises the amount of glucose in your blood. Carb counting is a method for keeping track of how many carbs you eat. Counting carbs is important to keep your blood glucose at a healthy level, especially if you use insulin or take certain oral diabetes medicines.  It is important to know how many carbs you can safely have in each meal. This is different for every person. Your dietitian can help you calculate how many carbs you should have at each meal and for each snack.  How does alcohol affect me?  Alcohol can cause a sudden decrease in blood glucose (hypoglycemia), especially if you use insulin or take certain oral diabetes medicines. Hypoglycemia can be a life-threatening condition. Symptoms of hypoglycemia, such as sleepiness, dizziness, and confusion, are similar to symptoms of having too much  "alcohol.  · Do not drink alcohol if:  ? Your health care provider tells you not to drink.  ? You are pregnant, may be pregnant, or are planning to become pregnant.  · If you drink alcohol:  ? Do not drink on an empty stomach.  ? Limit how much you use to:  § 0-1 drink a day for women.  § 0-2 drinks a day for men.  ? Be aware of how much alcohol is in your drink. In the [place] one drink equals one 12 oz bottle of beer (355 mL), one 5 oz glass of wine (148 mL), or one 1½ oz glass of hard liquor (44 mL).  ? Keep yourself hydrated with water, diet soda, or unsweetened iced tea.  § Keep in mind that regular soda, juice, and other mixers may contain a lot of sugar and must be counted as carbs.  What are tips for following this plan?    Reading food labels  · Start by checking the serving size on the \"Nutrition Facts\" label of packaged foods and drinks. The amount of calories, carbs, fats, and other nutrients listed on the label is based on one serving of the item. Many items contain more than one serving per package.  · Check the total grams (g) of carbs in one serving. You can calculate the number of servings of carbs in one serving by dividing the total carbs by 15. For example, if a food has 30 g of total carbs per serving, it would be equal to 2 servings of carbs.  · Check the number of grams (g) of saturated fats and trans fats in one serving. Choose foods that have a low amount or none of these fats.  · Check the number of milligrams (mg) of salt (sodium) in one serving. Most people should limit total sodium intake to less than 2,300 mg per day.  · Always check the nutrition information of foods labeled as \"low-fat\" or \"nonfat.\" These foods may be higher in added sugar or refined carbs and should be avoided.  · Talk to your dietitian to identify your daily goals for nutrients listed on the label.  Shopping  · Avoid buying canned, pre-made, or processed foods. These foods tend to be high in fat, sodium, and added " sugar.  · Shop around the outside edge of the grocery store. This is where you will most often find fresh fruits and vegetables, bulk grains, fresh meats, and fresh dairy.  Cooking  · Use low-heat cooking methods, such as baking, instead of high-heat cooking methods like deep frying.  · Cook using healthy oils, such as olive, canola, or sunflower oil.  · Avoid cooking with butter, cream, or high-fat meats.  Meal planning  · Eat meals and snacks regularly, preferably at the same times every day. Avoid going long periods of time without eating.  · Eat foods that are high in fiber, such as fresh fruits, vegetables, beans, and whole grains. Talk with your dietitian about how many servings of carbs you can eat at each meal.  · Eat 4-6 oz (112-168 g) of lean protein each day, such as lean meat, chicken, fish, eggs, or tofu. One ounce (oz) of lean protein is equal to:  ? 1 oz (28 g) of meat, chicken, or fish.  ? 1 egg.  ? ¼ cup (62 g) of tofu.  · Eat some foods each day that contain healthy fats, such as avocado, nuts, seeds, and fish.  What foods should I eat?  Fruits  Berries. Apples. Oranges. Peaches. Apricots. Plums. Grapes. Misael. Papaya. Pomegranate. Kiwi. Cherries.  Vegetables  Lettuce. Spinach. Leafy greens, including kale, chard, conrad greens, and mustard greens. Beets. Cauliflower. Cabbage. Broccoli. Carrots. Green beans. Tomatoes. Peppers. Onions. Cucumbers. [place] sprouts.  Grains  Whole grains, such as whole-wheat or whole-grain bread, crackers, tortillas, cereal, and pasta. Unsweetened oatmeal. Quinoa. Brown or wild rice.  Meats and other proteins  Seafood. Poultry without skin. Lean cuts of poultry and beef. Tofu. Nuts. Seeds.  Dairy  Low-fat or fat-free dairy products such as milk, yogurt, and cheese.  The items listed above may not be a complete list of foods and beverages you can eat. Contact a dietitian for more information.  What foods should I avoid?  Fruits  Fruits canned with  syrup.  Vegetables  Canned vegetables. Frozen vegetables with butter or cream sauce.  Grains  Refined white flour and flour products such as bread, pasta, snack foods, and cereals. Avoid all processed foods.  Meats and other proteins  Fatty cuts of meat. Poultry with skin. Breaded or fried meats. Processed meat. Avoid saturated fats.  Dairy  Full-fat yogurt, cheese, or milk.  Beverages  Sweetened drinks, such as soda or iced tea.  The items listed above may not be a complete list of foods and beverages you should avoid. Contact a dietitian for more information.  Questions to ask a health care provider  · Do I need to meet with a diabetes educator?  · Do I need to meet with a dietitian?  · What number can I call if I have questions?  · When are the best times to check my blood glucose?  Where to find more information:  · American Diabetes Association: diabetes.org  · Academy of Nutrition and Dietetics: www.eatright.org  · National [place] of Diabetes and Digestive and Kidney Diseases: www.niddk.nih.gov  · Association of Diabetes Care and Education Specialists: www.diabeteseducator.org  Summary  · It is important to have healthy eating habits because your blood sugar (glucose) levels are greatly affected by what you eat and drink.  · A healthy meal plan will help you control your blood glucose and maintain a healthy lifestyle.  · Your health care provider may recommend that you work with a dietitian to make a meal plan that is best for you.  · Keep in mind that carbohydrates (carbs) and alcohol have immediate effects on your blood glucose levels. It is important to count carbs and to use alcohol carefully.  This information is not intended to replace advice given to you by your health care provider. Make sure you discuss any questions you have with your health care provider.  Document Revised: 11/24/2020 Document Reviewed: 11/24/2020  Elsevier Patient Education © 2021 Elsevier Inc.    Amlodipine Oral Tablets  What  is this medicine?  AMLODIPINE (am TONEY joaquin dina) is a calcium channel blocker. It relaxes your blood vessels and decreases the amount of work the heart has to do. It treats high blood pressure and/or prevents chest pain (also called angina).  This medicine may be used for other purposes; ask your health care provider or pharmacist if you have questions.  COMMON BRAND NAME(S): Norvasc  What should I tell my health care provider before I take this medicine?  They need to know if you have any of these conditions:  · heart disease  · liver disease  · an unusual or allergic reaction to amlodipine, other drugs, foods, dyes, or preservatives  · pregnant or trying to get pregnant  · breast-feeding  How should I use this medicine?  Take this drug by mouth. Take it as directed on the prescription label at the same time every day. You can take it with or without food. If it upsets your stomach, take it with food. Keep taking it unless your health care provider tells you to stop.  Talk to your health care provider about the use of this drug in children. While it may be prescribed for children as young as 6 for selected conditions, precautions do apply.  Overdosage: If you think you have taken too much of this medicine contact a poison control center or emergency room at once.  NOTE: This medicine is only for you. Do not share this medicine with others.  What if I miss a dose?  If you miss a dose, take it as soon as you can. If it is almost time for your next dose, take only that dose. Do not take double or extra doses.  What may interact with this medicine?  This medicine may interact with the following medications:  · clarithromycin  · cyclosporine  · diltiazem  · itraconazole  · simvastatin  · tacrolimus  This list may not describe all possible interactions. Give your health care provider a list of all the medicines, herbs, non-prescription drugs, or dietary supplements you use. Also tell them if you smoke, drink alcohol, or  use illegal drugs. Some items may interact with your medicine.  What should I watch for while using this medicine?  Visit your health care provider for regular checks on your progress. Check your blood pressure as directed. Ask your health care provider what your blood pressure should be. Also, find out when you should contact him or her.  Do not treat yourself for coughs, colds, or pain while you are using this drug without asking your health care provider for advice. Some drugs may increase your blood pressure.  You may get drowsy or dizzy. Do not drive, use machinery, or do anything that needs mental alertness until you know how this drug affects you. Do not stand up or sit up quickly, especially if you are an older patient. This reduces the risk of dizzy or fainting spells.  What side effects may I notice from receiving this medicine?  Side effects that you should report to your doctor or health care provider as soon as possible:  · allergic reactions (skin rash, itching or hives; swelling of the face, lips, or tongue)  · heart attack (trouble breathing; pain or tightness in the chest, neck, back or arms; unusually weak or tired)  · low blood pressure (dizziness; feeling faint or lightheaded, falls; unusually weak or tired)  Side effects that usually do not require medical attention (report these to your doctor or health care provider if they continue or are bothersome):  · facial flushing  · nausea  · palpitations  · stomach pain  · sudden weight gain  · swelling of the ankles, feet, hands  This list may not describe all possible side effects. Call your doctor for medical advice about side effects. You may report side effects to FDA at 7-180-FDA-8987.  Where should I keep my medicine?  Keep out of the reach of children and pets.  Store at room temperature between 59 and 86 degrees F (15 and 30 degrees C). Protect from light and moisture. Keep the container tightly closed. Throw away any unused drug after the  expiration date.  NOTE: This sheet is a summary. It may not cover all possible information. If you have questions about this medicine, talk to your doctor, pharmacist, or health care provider.  © 2021 Elsevier/Gold Standard (2020-09-22 19:39:45)

## 2021-03-24 ENCOUNTER — TELEPHONE (OUTPATIENT)
Dept: INTERNAL MEDICINE | Facility: CLINIC | Age: 71
End: 2021-03-24

## 2021-03-24 NOTE — TELEPHONE ENCOUNTER
Please call and see what type of problem she is having.  If something urgent, we can ask Dr. Lebron about it, o/w can forward to Jayla's inbox for her or Joan to review.

## 2021-03-24 NOTE — TELEPHONE ENCOUNTER
Patient was started on zpack yesterday for a right ear infection. She took the first 2 tablets yesterday. Patient has an abnormal heartbeat and that's one of the things listed that she shouldn't take this particular medication if she has an abnormal heartbeat.

## 2021-03-24 NOTE — TELEPHONE ENCOUNTER
Notified patient that Jayla wouldn't have given patient that particular medication if it wasn't safe. If medication was switched to a different antibiotic it can have side effects too. Patient agreeable. Will call if she has any problems.

## 2021-03-24 NOTE — TELEPHONE ENCOUNTER
Caller: Connor Molina    Relationship: Self    Best call back number:[phone]    What is the best time to reach you: ANYTHIME    Who are you requesting to speak with (clinical staff, provider,  specific staff member): PROVIDER    Do you know the name of the person who called: CONNOR    What was the call regarding: SIDE AFFECTS OF MEDICATION     Do you require a callback: YES

## 2021-04-07 RX ORDER — AMLODIPINE BESYLATE 5 MG/1
5 TABLET ORAL DAILY
Qty: 30 TABLET | Refills: 11 | Status: SHIPPED | OUTPATIENT
Start: 2021-04-07 | End: 2022-04-05

## 2021-04-07 NOTE — TELEPHONE ENCOUNTER
Patient states that she has not been checking her blood pressure. She states she has had a lot going on(death in the family) She states she feels like her blood pressure is doing ok. She is still taking Amlodipine 5 1/2 tablet daily.

## 2021-04-21 ENCOUNTER — OFFICE VISIT (OUTPATIENT)
Dept: GASTROENTEROLOGY | Facility: CLINIC | Age: 71
End: 2021-04-21

## 2021-04-21 VITALS
SYSTOLIC BLOOD PRESSURE: 128 MMHG | HEIGHT: 60 IN | WEIGHT: 175 LBS | BODY MASS INDEX: 34.36 KG/M2 | TEMPERATURE: 97.5 F | HEART RATE: 66 BPM | OXYGEN SATURATION: 98 % | DIASTOLIC BLOOD PRESSURE: 80 MMHG

## 2021-04-21 DIAGNOSIS — Z83.71 FAMILY HISTORY OF POLYPS IN THE COLON: ICD-10-CM

## 2021-04-21 DIAGNOSIS — Z86.010 HISTORY OF ADENOMATOUS POLYP OF COLON: Primary | ICD-10-CM

## 2021-04-21 PROCEDURE — S0260 H&P FOR SURGERY: HCPCS | Performed by: NURSE PRACTITIONER

## 2021-04-21 RX ORDER — SODIUM, POTASSIUM,MAG SULFATES 17.5-3.13G
1 SOLUTION, RECONSTITUTED, ORAL ORAL EVERY 12 HOURS
Qty: 1 ML | Refills: 0 | Status: ON HOLD | OUTPATIENT
Start: 2021-04-21 | End: 2021-06-18

## 2021-04-21 NOTE — PROGRESS NOTES
Chief Complaint   Patient presents with   • Colon Cancer Screening     Pt presents today for colon recall-last colon was 3/18/2015; Personal history of adenomatous and hyperplastic polyps; Family history o colon polyps     Subjective   HPI  Pauline Molina is a 71 y.o. female who presents as a referral for preventative maintenance. She has no complaints of nausea or vomiting. No change in bowels. No wt loss. No BRBPR. No melena. There is no family hx for colon cancer. No abdominal pain. There was no Cologuard screening this year.  The patient's last colonoscopy performed on 3/18/2017 with findings of a hyperplastic polyp.  Patient does carry history of adenomatous polyps.     Past Medical History:   Diagnosis Date   • Family history of colonic polyps    • GERD (gastroesophageal reflux disease)    • History of adenomatous polyp of colon    • History of colon polyps    • Hyperlipidemia    • Hypertension    • MVP (mitral valve prolapse)    • Pseudotumor cerebri    • Type 2 diabetes mellitus (CMS/HCC)      Past Surgical History:   Procedure Laterality Date   •  SECTION     • CHOLECYSTECTOMY     • COLONOSCOPY  2015    One 3-4mm hyperplastic polyp in the rectum; Repeat 5 years   • COLONOSCOPY  2007    Normal; Repeat 3-4 years   • COLONOSCOPY  2004    One 2cm pedunculated erythematous adenomatous polyp in the sigmoid colon; One 6mm hyperplastic polyp in the rectum; Repeat 3 years   • DILATATION AND CURETTAGE     • ENDOSCOPY  2009    Normal endoscopy   • ENDOSCOPY  2004    GERD   • HERNIA REPAIR     • HYSTERECTOMY     • LAPAROSCOPIC LYSIS OF ADHESIONS     • OOPHORECTOMY     • OTHER SURGICAL HISTORY  2010    EUS-Dr. Hastings-Normal EUS evaluation of the pancreas, bile duct, and ampulla of Vater-pancreatitis remains idiopathic       Current Outpatient Medications:   •  amLODIPine (NORVASC) 5 MG tablet, Take 1 tablet by mouth Daily., Disp: 30 tablet, Rfl: 11  •  ASPIRIN 81 PO, Take 1  capsule by mouth Daily., Disp: , Rfl:   •  B Complex Vitamins (VITAMIN B COMPLEX PO), Take 1 capsule by mouth Daily., Disp: , Rfl:   •  Barberry-Oreg Grape-Goldenseal (Berberine Complex) 200-200-50 MG capsule, Take 1 capsule by mouth Daily., Disp: , Rfl:   •  clobetasol (TEMOVATE) 0.05 % cream, Apply  topically 2 (Two) Times a Day. Apply sparingly to affected area two nights each week, external only, Disp: 30 g, Rfl: 1  •  Coenzyme Q10 (CoQ-10) 100 MG capsule, Take 1 capsule by mouth Daily., Disp: , Rfl:   •  Fenugreek 500 MG capsule, Take 1 capsule by mouth Daily., Disp: , Rfl:   •  losartan (COZAAR) 100 MG tablet, Take 1 tablet by mouth once daily, Disp: 90 tablet, Rfl: 3  •  metFORMIN ER (GLUCOPHAGE-XR) 500 MG 24 hr tablet, Take 1 tablet by mouth Daily With Breakfast., Disp: 90 tablet, Rfl: 3  •  metoprolol tartrate (LOPRESSOR) 50 MG tablet, Take 1 tablet by mouth 2 (Two) Times a Day. (Patient taking differently: Take 50 mg by mouth Daily.), Disp: 180 tablet, Rfl: 1  •  vitamin D3 125 MCG (5000 UT) capsule capsule, Take 1 capsule by mouth Daily., Disp: , Rfl:   •  amLODIPine (NORVASC) 2.5 MG tablet, Take 1 tablet by mouth Daily. (Patient taking differently: Take 2.5 mg by mouth Daily. 4/21/2021-Pt will start taking in a few weeks), Disp: 30 tablet, Rfl: 5  •  sodium-potassium-magnesium sulfates (Suprep Bowel Prep Kit) 17.5-3.13-1.6 GM/177ML solution oral solution, Take 1 bottle by mouth Every 12 (Twelve) Hours. Split dose prep as directed by office instructions provided.  2 bottles = one kit., Disp: 1 mL, Rfl: 0  Allergies   Allergen Reactions   • Lactase GI Intolerance     Social History     Socioeconomic History   • Marital status:      Spouse name: Not on file   • Number of children: Not on file   • Years of education: Not on file   • Highest education level: Not on file   Tobacco Use   • Smoking status: Never Smoker   • Smokeless tobacco: Never Used   Vaping Use   • Vaping Use: Never used   Substance  "and Sexual Activity   • Alcohol use: Not Currently   • Drug use: Defer     Family History   Problem Relation Age of Onset   • Prostate cancer Father    • Colon polyps Father         > 60 years of age   • Stomach cancer Paternal Grandmother 48   • Colon cancer Neg Hx    • Esophageal cancer Neg Hx    • Liver cancer Neg Hx    • Liver disease Neg Hx    • Rectal cancer Neg Hx        REVIEW OF SYSTEMS  General: well appearing, no fever chills or sweats, no unexplained wt loss  HEENT: no acute visual or hearing disturbances  Cardiovascular: No chest pain or palpitations  Pulmonary: No shortness of breath, coughing, wheezing or hemoptysis  : No burning, urgency, hematuria, or dysuria  Musculoskeletal: No joint pain or stiffness  Peripheral: no edema  Skin: No lesions or rashes  Neuro: No dizziness, headaches, stroke, syncope  Endocrine: No hot or cold intolerances  Hematological: No blood dyscrasias    Objective   Vitals:    04/21/21 1300   BP: 128/80   BP Location: Left arm   Patient Position: Sitting   Cuff Size: Adult   Pulse: 66   Temp: 97.5 °F (36.4 °C)   TempSrc: Infrared   SpO2: 98%   Weight: 79.4 kg (175 lb)   Height: 152.4 cm (60\")     Body mass index is 34.18 kg/m².    PHYSICAL EXAM  General: age appropriate well nourished well appearing, no acute distress  Head: normocephalic and atraumatic  Global assessment-supple  Neck-No JVD noted, no lymphadenopathy  Pulmonary-clear to auscultation bilaterally, normal respiratory effort  Cardiovascular-normal rate and rhythm, normal heart sounds, S1 and S2 noted  Abdomen-soft, non tender, non distended, normal bowel sounds all 4 quadrants, no hepatosplenomegaly noted  Extremities-No clubbing cyanosis or edema  Neuro-Non focal, converses appropriately, awake, alert, oriented    Lab Results - Last 18 Months   Lab Units 12/17/20  1101   BUN mg/dL 24*   CREATININE mg/dL 0.71   SODIUM mmol/L 136   POTASSIUM mmol/L 4.6   CHLORIDE mmol/L 100   TOTAL CO2 mmol/L 26.0   ALBUMIN g/dL " 4.40   ALT (SGPT) U/L 27   AST (SGOT) U/L 25   ALK PHOS U/L 119*   BILIRUBIN mg/dL 0.7       Lab Results - Last 18 Months   Lab Units 12/17/20  1101   HEMOGLOBIN g/dL 14.9   HEMATOCRIT % 44.1   MCV fL 89.6   WBC 10*3/mm3 4.61   RDW % 13.4   PLATELETS 10*3/mm3 258       Lab Results - Last 18 Months   Lab Units 12/17/20  1101 11/19/19  1535   TSH uIU/mL 2.160 3.210   VIT D 25 HYDROXY ng/ml 46.8  --         Imaging Results (Most Recent)     None        Assessment/Plan   Diagnoses and all orders for this visit:    1. History of adenomatous polyp of colon (Primary)  -     Case Request; Standing  -     Case Request    2. Family history of polyps in the colon  -     Case Request; Standing  -     Case Request    Other orders  -     Follow Anesthesia Guidelines / Protocol; Future  -     Obtain Informed Consent; Future  -     Implement Anesthesia Orders Day of Procedure; Standing  -     Obtain Informed Consent; Standing  -     Verify bowel prep was successful; Standing  -     sodium-potassium-magnesium sulfates (Suprep Bowel Prep Kit) 17.5-3.13-1.6 GM/177ML solution oral solution; Take 1 bottle by mouth Every 12 (Twelve) Hours. Split dose prep as directed by office instructions provided.  2 bottles = one kit.  Dispense: 1 mL; Refill: 0      COLONOSCOPY WITH ANESTHESIA (N/A)       Body mass index is 34.18 kg/m². Patient's Body mass index is 34.18 kg/m². BMI is above normal parameters. Recommendations include: nutrition counseling.      All risks, benefits, alternatives, and indications of colonoscopy procedure have been discussed with the patient. Risks to include perforation of the colon requiring possible surgery or colostomy, risk of bleeding from biopsies or removal of colon tissue, possibility of missing a colon polyp or cancer, or adverse drug reaction.  Benefits to include the diagnosis and management of disease of the colon and rectum. Alternatives to include barium enema, radiographic evaluation, lab testing or no  intervention. Pt verbalizes understanding and agrees.

## 2021-04-22 PROBLEM — Z83.719 FAMILY HISTORY OF POLYPS IN THE COLON: Status: ACTIVE | Noted: 2021-04-22

## 2021-04-22 PROBLEM — Z86.010 HISTORY OF ADENOMATOUS POLYP OF COLON: Status: ACTIVE | Noted: 2021-04-22

## 2021-04-22 PROBLEM — Z86.0101 HISTORY OF ADENOMATOUS POLYP OF COLON: Status: ACTIVE | Noted: 2021-04-22

## 2021-04-22 PROBLEM — Z83.71 FAMILY HISTORY OF POLYPS IN THE COLON: Status: ACTIVE | Noted: 2021-04-22

## 2021-04-26 ENCOUNTER — OFFICE VISIT (OUTPATIENT)
Dept: CARDIOLOGY CLINIC | Age: 71
End: 2021-04-26
Payer: MEDICARE

## 2021-04-26 VITALS
SYSTOLIC BLOOD PRESSURE: 128 MMHG | HEIGHT: 60 IN | WEIGHT: 181 LBS | DIASTOLIC BLOOD PRESSURE: 78 MMHG | HEART RATE: 68 BPM | BODY MASS INDEX: 35.53 KG/M2

## 2021-04-26 DIAGNOSIS — I10 ESSENTIAL HYPERTENSION: ICD-10-CM

## 2021-04-26 DIAGNOSIS — R00.2 PALPITATION: Primary | ICD-10-CM

## 2021-04-26 PROCEDURE — 93000 ELECTROCARDIOGRAM COMPLETE: CPT | Performed by: NURSE PRACTITIONER

## 2021-04-26 PROCEDURE — 99214 OFFICE O/P EST MOD 30 MIN: CPT | Performed by: NURSE PRACTITIONER

## 2021-04-26 RX ORDER — ASPIRIN 81 MG/1
81 TABLET ORAL DAILY
COMMUNITY
End: 2022-04-29

## 2021-04-26 ASSESSMENT — ENCOUNTER SYMPTOMS
CHEST TIGHTNESS: 0
WHEEZING: 0
SHORTNESS OF BREATH: 0
COUGH: 0
SORE THROAT: 0

## 2021-04-26 NOTE — PROGRESS NOTES
[id number] [facility] Cardiology   Established Patient Office Visit  [address]  [address] [address] [facility]  [phone]        OFFICE VISIT:  2021    Gabi Arita - : 1950    Reason For Visit:  Teresa Fitch is a 73 y.o. female who is here for Follow-up, Palpitations, and Hypertension    1. Palpitation    2. Essential hypertension          Patient with a history of mitral valve prolapse, diabetes, and hypertension.  Patient has a family history of coronary artery disease which includes a brother, father and mother. Parish Barrett is a non-smoker. Patient states in 2020 she did have the Covid infection. Patient presents to clinic today with no complaints of chest pain, pressure or tightness. There is no shortness of breath, orthopnea or PND. Patient denies any lightheadedness, dizziness or syncope. Patient has occasional episodes of palpitations. DATA:  2019  Lexiscan apical ischemia, with a calculated ejection fraction of 68%.  With an 8% of ischemic burden. 2019 Cardiac catheterization:     1.  Successful femoral artery ultrasound  2.  Successful femoral artery arteriogram  3.  Supervision of the administration of moderate conscious sedation  4.  Mild coronary artery disease  5.  Left ventricular function is normal      2D ECHO 2019  Structurally normal.   Normal mitral valve leaflet mobility. Trace mitral regurgitation. Normal left ventricular size with preserved LV function and an estimated ejection fraction of approximately 55-60%. No regional wall motion abnormalities identified. Grade I diastolic dysfunction.  No evidence of left ventricular mass or thrombus noted.     ZIO Patch: sinus rhythm with a minimum heart rate of 47 bpm, maximum 148 bpm.  124 SVT runs fastest at a rate of 148 bpm.  Isolated supraventricular ectopy was noted 15.2%.         Subjective    Lizz Cornell is a 73 y.o. female with the following history as recorded in [facility]:    Patient Active Problem List    Diagnosis Date Noted    Abnormal nuclear cardiac imaging test      Priority: High    Chest pain 07/08/2019    Fibrocystic breast 07/26/2013    Panic attack 07/26/2013     Current Outpatient Medications   Medication Sig Dispense Refill    aspirin 81 MG EC tablet Take 81 mg by mouth daily      metFORMIN (GLUCOPHAGE) 500 MG tablet Take 500 mg by mouth daily      amLODIPine (NORVASC) 5 MG tablet 10 mg      losartan (COZAAR) 100 MG tablet Take 100 mg by mouth daily.  metoprolol (LOPRESSOR) 50 MG tablet Take 50 mg by mouth daily       amitriptyline (ELAVIL) 25 MG tablet TAKE 1 TABLET BY MOUTH AT BEDTIME  3     No current facility-administered medications for this visit. Allergies: Lactose intolerance (gi)  Past Medical History:   Diagnosis Date    Anxiety     Headache(784.0)     Hypertension     Type II or unspecified type diabetes mellitus without mention of complication, not stated as uncontrolled      Past Surgical History:   Procedure Laterality Date    ABDOMEN SURGERY      to remove growth off of fallopian tube.  APPENDECTOMY  1973    Pt also had a mass on tubes she had removed with this surgery   [address] 59    CHOLECYSTECTOMY  2009    [address] 80 AND CURETTAGE OF UTERUS  2007?    w/Ablation? [phone]  2009    HYSTERECTOMY  2008    total     Family History   Problem Relation Age of Onset    High Blood Pressure Mother     High Blood Pressure Father     High Blood Pressure Sister     High Blood Pressure Brother     Breast Cancer Maternal Grandmother      Social History     Tobacco Use    Smoking status: Never Smoker    Smokeless tobacco: Never Used   Substance Use Topics    Alcohol use: No          Review of Systems:    Review of Systems   Constitutional: Negative for activity change, chills, diaphoresis, fatigue and fever. HENT: Negative for congestion and sore throat.     Respiratory: Negative for cough, chest tightness, shortness of breath and wheezing. Cardiovascular: Positive for palpitations (occassional). Negative for chest pain and leg swelling. Musculoskeletal: Negative. Skin: Negative. Neurological: Negative for dizziness, syncope, light-headedness and headaches. Psychiatric/Behavioral: Negative for confusion. The patient is not nervous/anxious. Objective:    /78   Pulse 68   Ht 5' (1.524 m)   Wt 181 lb (82.1 kg)   BMI 35.35 kg/m²     GENERAL - well developed and well nourished, conversant  HEENT -  PERRLA, Hearing appears normal, gentleman lids are normal.  External inspection of ears and nose appear normal.  NECK - no thyromegaly, no JVD, trachea is in the midline  CARDIOVASCULAR - PMI is in the mid line clavicular position, Normal S1 and S2 with no systolic murmur. No S3 or S4    PULMONARY - no respiratory distress. No wheezes or rales. Lungs are clear to ausculation, normal respiratory effort. ABDOMEN  - soft, non tender, no rebound  MUSCULOSKELETAL  - range of motion of the upper and lower extermites appears normal and equal and is without pain   EXTREMITIES - no significant edema   NEUROLOGIC - gait and station are normal  SKIN - turgor is normal, can warm and dry. PSYCHIATRIC - normal mood and affect, alert and orientated x 3,      ASSESSMENT:    ALL THE CARDIOLOGY PROBLEMS ARE LISTED ABOVE; HOWEVER, THE FOLLOWING SPECIFIC CARDIAC PROBLEMS / CONDITIONS WERE ADDRESSED AND TREATED DURING THE OFFICE VISIT TODAY:                                                                                            MEDICAL DECISION MAKING             Cardiac Specific Problem / Diagnosis  Discussion and Data Reviewed Diagnostic Procedures Ordered Management Options Selected           1. Hypertension  Well Controlled   Review and summation of old records:    Blood pressure in the office today 128/78. Patient is on Norvasc 5 mg daily. Losartan 100 mg daily. And Lopressor 50 mg daily.     EKG in the office today sinus rhythm with a heart rate of 68 bpm with occasional PVCs. Yes Continue current medications:     Yes           2. Palpitations  Well Controlled   Controlled on metoprolol 50 mg daily. No Continue current medications: Yes                                     Orders Placed This Encounter   Procedures    EKG 12 lead     Order Specific Question:   Reason for Exam?     Answer:   Irregular heart rate     Comments:   palpitations     No orders of the defined types were placed in this encounter. Discussed with patient. Return in about 6 months (around 10/26/2021) for Routine with  me. I greatly appreciate the opportunity to meet Stefan Benavidez and your confidence in allowing me to participate in her cardiovascular care. GAEL Hill NP  4/26/2021 11:40 AM CDT                    This dictation was generated by voice recognition computer software. Although all attempts are made to edit dictation for accuracy, there may be errors in the transcription that are not intended.

## 2021-04-27 DIAGNOSIS — Z12.31 BREAST CANCER SCREENING BY MAMMOGRAM: ICD-10-CM

## 2021-04-28 ENCOUNTER — TELEPHONE (OUTPATIENT)
Dept: INTERNAL MEDICINE | Facility: CLINIC | Age: 71
End: 2021-04-28

## 2021-05-12 ENCOUNTER — TELEPHONE (OUTPATIENT)
Dept: INTERNAL MEDICINE | Facility: CLINIC | Age: 71
End: 2021-05-12

## 2021-05-12 DIAGNOSIS — Z01.00 ROUTINE EYE EXAM: Primary | ICD-10-CM

## 2021-05-12 NOTE — TELEPHONE ENCOUNTER
PT IS NEEDING REFERRAL TO [facility] TO GET THEM TO ACCEPT HER INSURANCE.  WHEN SHE CALLED THEY SAID HER PCP WOULD HAVE TO REFER HER    DR. ALISSA HENDRICKSON IN [place]

## 2021-05-14 RX ORDER — METOPROLOL TARTRATE 50 MG/1
50 TABLET, FILM COATED ORAL DAILY
Qty: 90 TABLET | Refills: 3 | Status: SHIPPED | OUTPATIENT
Start: 2021-05-14 | End: 2021-07-14

## 2021-06-17 ENCOUNTER — OFFICE VISIT (OUTPATIENT)
Dept: INTERNAL MEDICINE | Facility: CLINIC | Age: 71
End: 2021-06-17

## 2021-06-17 VITALS
DIASTOLIC BLOOD PRESSURE: 80 MMHG | OXYGEN SATURATION: 99 % | SYSTOLIC BLOOD PRESSURE: 142 MMHG | TEMPERATURE: 97.5 F | HEIGHT: 60 IN | BODY MASS INDEX: 36.05 KG/M2 | HEART RATE: 68 BPM | WEIGHT: 183.6 LBS

## 2021-06-17 DIAGNOSIS — I10 ESSENTIAL HYPERTENSION: Primary | ICD-10-CM

## 2021-06-17 DIAGNOSIS — E11.9 DIABETES MELLITUS TREATED WITH ORAL MEDICATION (HCC): ICD-10-CM

## 2021-06-17 DIAGNOSIS — Z79.84 DIABETES MELLITUS TREATED WITH ORAL MEDICATION (HCC): ICD-10-CM

## 2021-06-17 LAB — HBA1C MFR BLD: 7.7 %

## 2021-06-17 PROCEDURE — 99213 OFFICE O/P EST LOW 20 MIN: CPT | Performed by: NURSE PRACTITIONER

## 2021-06-17 PROCEDURE — 83036 HEMOGLOBIN GLYCOSYLATED A1C: CPT | Performed by: NURSE PRACTITIONER

## 2021-06-17 NOTE — PROGRESS NOTES
"Chief Complaint  Diabetes (A1C: 7.7% ) and Hypertension (3 Month Follow Up)    Subjective          Pauline Molina presents to [facility]  Mrs. Molina is a pleasant 70 yo female who present for hypertension and diabetes management. She reports added stress and difficulty with meal prepping related to taking care of her mother and having limited time for meal preparation and eating out more. She admits to eating more carbohydrate over the last couple of months compared to the past. She is motivated to work on diet and does not want medicine change at this time.     Her blood pressure is elevated today. She reports blood pressure at home was 120-130/80-90. She states \" stress at home has been higher than usual\". She does have intermittent palpitations, but has had a work up with Dr. Lezama that concluded PVC's  PAC's were the cause of her symptoms and started on metoprolol. Her last follow up with cardiologist was in April. Her EKG's had unchanged with similar symptom presentation. She denies chest pain, shortness of breath, orthopena, or lower extremity swelling.       Objective   Vital Signs:   /80 (BP Location: Left arm, Patient Position: Sitting, Cuff Size: Adult)   Pulse 68   Temp 97.5 °F (36.4 °C) (Temporal)   Ht 152.4 cm (60\")   Wt 83.3 kg (183 lb 9.6 oz)   SpO2 99%   BMI 35.86 kg/m²     Physical Exam  Vitals and nursing note reviewed.   Constitutional:       Appearance: Normal appearance. She is well-developed. She is morbidly obese.   HENT:      Head: Normocephalic and atraumatic.      Right Ear: Hearing and external ear normal.      Left Ear: Hearing and external ear normal.      Nose: Nose normal.   Eyes:      Conjunctiva/sclera: Conjunctivae normal.      Pupils: Pupils are equal, round, and reactive to light.   Neck:      Thyroid: No thyromegaly.   Cardiovascular:      Rate and Rhythm: Normal rate and regular rhythm. Occasional extrasystoles are present.     " Pulses: Normal pulses.      Heart sounds: Murmur heard.   Systolic murmur is present with a grade of 1/6.   No friction rub. No gallop.    Pulmonary:      Effort: Pulmonary effort is normal.      Breath sounds: Normal breath sounds.   Abdominal:      General: Abdomen is protuberant. Bowel sounds are normal.      Palpations: Abdomen is soft.   Musculoskeletal:      Cervical back: Normal range of motion and neck supple.      Right lower leg: No edema.      Left lower leg: No edema.   Lymphadenopathy:      Cervical: No cervical adenopathy.   Skin:     General: Skin is warm and dry.   Neurological:      Mental Status: She is alert and oriented to person, place, and time.   Psychiatric:         Attention and Perception: Attention normal.         Mood and Affect: Mood is anxious.         Speech: Speech normal.         Behavior: Behavior normal. Behavior is cooperative.         Thought Content: Thought content normal.         Cognition and Memory: Cognition and memory normal.         Judgment: Judgment normal.        Result Review :     CMP    CMP 12/17/20   Glucose 211 (A)   BUN 24 (A)   Creatinine 0.71   eGFR Non  Am 82   eGFR African Am 99   Sodium 136   Potassium 4.6   Chloride 100   Calcium 9.2   Total Protein 7.1   Albumin 4.40   Globulin 2.7   Total Bilirubin 0.7   Alkaline Phosphatase 119 (A)   AST (SGOT) 25   ALT (SGPT) 27   (A) Abnormal value       Comments are available for some flowsheets but are not being displayed.           CBC    CBC 12/17/20   WBC 4.61   RBC 4.92   Hemoglobin 14.9   Hematocrit 44.1   MCV 89.6   MCH 30.3   MCHC 33.8   RDW 13.4   Platelets 258           Data reviewed: Consultant notes Cardiologist [facility] 4/21/21          Assessment and Plan    Diagnoses and all orders for this visit:    1. Essential hypertension (Primary)  -     Basic Metabolic Panel    2. Diabetes mellitus treated with oral medication (CMS/[facility])  -     POC Glycosylated Hemoglobin (Hb A1C)      I spent 30 minutes  caring for Pauline on this date of service. This time includes time spent by me in the following activities:preparing for the visit, reviewing tests, obtaining and/or reviewing a separately obtained history, performing a medically appropriate examination and/or evaluation , counseling and educating the patient/family/caregiver, ordering medications, tests, or procedures, documenting information in the medical record, independently interpreting results and communicating that information with the patient/family/caregiver and care coordination     Follow Up     Return in about 4 weeks (around 7/15/2021) for Recheck DM.     She is wanting a month to work on her diet. She will need to work on checking her blood sugars more regularly. Will have her check blood sugars in the morning. Will work on eliminating snacking and eating out as often as possible. She will discontinue bread and sugar intake.    He blood pressure is elevated. Normal at home. Blood pressure rechecked and similar to initial reading (140/78). No change in medicine, work on diet and lifestyle changes for 1 month. She will check her blood pressure 3 times a week and if blood pressure > 140/90 persistently, will need to increase norvasc. Educated her on AHA diet and DASH diet.     Patient was given instructions and counseling regarding her condition or for health maintenance advice. Please see specific information pulled into the AVS if appropriate.

## 2021-06-17 NOTE — PATIENT INSTRUCTIONS
Diabetes Mellitus and Nutrition, Adult  When you have diabetes, or diabetes mellitus, it is very important to have healthy eating habits because your blood sugar (glucose) levels are greatly affected by what you eat and drink. Eating healthy foods in the right amounts, at about the same times every day, can help you:  · Control your blood glucose.  · Lower your risk of heart disease.  · Improve your blood pressure.  · Reach or maintain a healthy weight.  What can affect my meal plan?  Every person with diabetes is different, and each person has different needs for a meal plan. Your health care provider may recommend that you work with a dietitian to make a meal plan that is best for you. Your meal plan may vary depending on factors such as:  · The calories you need.  · The medicines you take.  · Your weight.  · Your blood glucose, blood pressure, and cholesterol levels.  · Your activity level.  · Other health conditions you have, such as heart or kidney disease.  How do carbohydrates affect me?  Carbohydrates, also called carbs, affect your blood glucose level more than any other type of food. Eating carbs naturally raises the amount of glucose in your blood. Carb counting is a method for keeping track of how many carbs you eat. Counting carbs is important to keep your blood glucose at a healthy level, especially if you use insulin or take certain oral diabetes medicines.  It is important to know how many carbs you can safely have in each meal. This is different for every person. Your dietitian can help you calculate how many carbs you should have at each meal and for each snack.  How does alcohol affect me?  Alcohol can cause a sudden decrease in blood glucose (hypoglycemia), especially if you use insulin or take certain oral diabetes medicines. Hypoglycemia can be a life-threatening condition. Symptoms of hypoglycemia, such as sleepiness, dizziness, and confusion, are similar to symptoms of having too much  "alcohol.  · Do not drink alcohol if:  ? Your health care provider tells you not to drink.  ? You are pregnant, may be pregnant, or are planning to become pregnant.  · If you drink alcohol:  ? Do not drink on an empty stomach.  ? Limit how much you use to:  § 0-1 drink a day for women.  § 0-2 drinks a day for men.  ? Be aware of how much alcohol is in your drink. In the [place] one drink equals one 12 oz bottle of beer (355 mL), one 5 oz glass of wine (148 mL), or one 1½ oz glass of hard liquor (44 mL).  ? Keep yourself hydrated with water, diet soda, or unsweetened iced tea.  § Keep in mind that regular soda, juice, and other mixers may contain a lot of sugar and must be counted as carbs.  What are tips for following this plan?    Reading food labels  · Start by checking the serving size on the \"Nutrition Facts\" label of packaged foods and drinks. The amount of calories, carbs, fats, and other nutrients listed on the label is based on one serving of the item. Many items contain more than one serving per package.  · Check the total grams (g) of carbs in one serving. You can calculate the number of servings of carbs in one serving by dividing the total carbs by 15. For example, if a food has 30 g of total carbs per serving, it would be equal to 2 servings of carbs.  · Check the number of grams (g) of saturated fats and trans fats in one serving. Choose foods that have a low amount or none of these fats.  · Check the number of milligrams (mg) of salt (sodium) in one serving. Most people should limit total sodium intake to less than 2,300 mg per day.  · Always check the nutrition information of foods labeled as \"low-fat\" or \"nonfat.\" These foods may be higher in added sugar or refined carbs and should be avoided.  · Talk to your dietitian to identify your daily goals for nutrients listed on the label.  Shopping  · Avoid buying canned, pre-made, or processed foods. These foods tend to be high in fat, sodium, and added " sugar.  · Shop around the outside edge of the grocery store. This is where you will most often find fresh fruits and vegetables, bulk grains, fresh meats, and fresh dairy.  Cooking  · Use low-heat cooking methods, such as baking, instead of high-heat cooking methods like deep frying.  · Cook using healthy oils, such as olive, canola, or sunflower oil.  · Avoid cooking with butter, cream, or high-fat meats.  Meal planning  · Eat meals and snacks regularly, preferably at the same times every day. Avoid going long periods of time without eating.  · Eat foods that are high in fiber, such as fresh fruits, vegetables, beans, and whole grains. Talk with your dietitian about how many servings of carbs you can eat at each meal.  · Eat 4-6 oz (112-168 g) of lean protein each day, such as lean meat, chicken, fish, eggs, or tofu. One ounce (oz) of lean protein is equal to:  ? 1 oz (28 g) of meat, chicken, or fish.  ? 1 egg.  ? ¼ cup (62 g) of tofu.  · Eat some foods each day that contain healthy fats, such as avocado, nuts, seeds, and fish.  What foods should I eat?  Fruits  Berries. Apples. Oranges. Peaches. Apricots. Plums. Grapes. Misael. Papaya. Pomegranate. Kiwi. Cherries.  Vegetables  Lettuce. Spinach. Leafy greens, including kale, chard, conrad greens, and mustard greens. Beets. Cauliflower. Cabbage. Broccoli. Carrots. Green beans. Tomatoes. Peppers. Onions. Cucumbers. [place] sprouts.  Grains  Whole grains, such as whole-wheat or whole-grain bread, crackers, tortillas, cereal, and pasta. Unsweetened oatmeal. Quinoa. Brown or wild rice.  Meats and other proteins  Seafood. Poultry without skin. Lean cuts of poultry and beef. Tofu. Nuts. Seeds.  Dairy  Low-fat or fat-free dairy products such as milk, yogurt, and cheese.  The items listed above may not be a complete list of foods and beverages you can eat. Contact a dietitian for more information.  What foods should I avoid?  Fruits  Fruits canned with  syrup.  Vegetables  Canned vegetables. Frozen vegetables with butter or cream sauce.  Grains  Refined white flour and flour products such as bread, pasta, snack foods, and cereals. Avoid all processed foods.  Meats and other proteins  Fatty cuts of meat. Poultry with skin. Breaded or fried meats. Processed meat. Avoid saturated fats.  Dairy  Full-fat yogurt, cheese, or milk.  Beverages  Sweetened drinks, such as soda or iced tea.  The items listed above may not be a complete list of foods and beverages you should avoid. Contact a dietitian for more information.  Questions to ask a health care provider  · Do I need to meet with a diabetes educator?  · Do I need to meet with a dietitian?  · What number can I call if I have questions?  · When are the best times to check my blood glucose?  Where to find more information:  · American Diabetes Association: diabetes.org  · Academy of Nutrition and Dietetics: www.eatright.org  · National [place] of Diabetes and Digestive and Kidney Diseases: www.niddk.nih.gov  · Association of Diabetes Care and Education Specialists: www.diabeteseducator.org  Summary  · It is important to have healthy eating habits because your blood sugar (glucose) levels are greatly affected by what you eat and drink.  · A healthy meal plan will help you control your blood glucose and maintain a healthy lifestyle.  · Your health care provider may recommend that you work with a dietitian to make a meal plan that is best for you.  · Keep in mind that carbohydrates (carbs) and alcohol have immediate effects on your blood glucose levels. It is important to count carbs and to use alcohol carefully.  This information is not intended to replace advice given to you by your health care provider. Make sure you discuss any questions you have with your health care provider.  Document Revised: 11/24/2020 Document Reviewed: 11/24/2020  Elsevier Patient Education © 2021 [employer] Inc.      Mindfulness-Based Stress  Reduction  Mindfulness-based stress reduction (MBSR) is a program that helps people learn to practice mindfulness. Mindfulness is the practice of intentionally paying attention to the present moment. It can be learned and practiced through techniques such as education, breathing exercises, meditation, and yoga. MBSR includes several mindfulness techniques in one program.  MBSR works best when you understand the treatment, are willing to try new things, and can commit to spending time practicing what you learn. MBSR training may include learning about:  · How your emotions, thoughts, and reactions affect your body.  · New ways to respond to things that cause negative thoughts to start (triggers).  · How to notice your thoughts and let go of them.  · Practicing awareness of everyday things that you normally do without thinking.  · The techniques and goals of different types of meditation.  What are the benefits of MBSR?  MBSR can have many benefits, which include helping you to:  · Develop self-awareness. This refers to knowing and understanding yourself.  · Learn skills and attitudes that help you to participate in your own health care.  · Learn new ways to care for yourself.  · Be more accepting about how things are, and let things go.  · Be less judgmental and approach things with an open mind.  · Be patient with yourself and trust yourself more.  MBSR has also been shown to:  · Reduce negative emotions, such as depression and anxiety.  · Improve memory and focus.  · Change how you sense and approach pain.  · Boost your body's ability to fight infections.  · Help you connect better with other people.  · Improve your sense of well-being.  Follow these instructions at home:    · Find a local in-person or online MBSR program.  · Set aside some time regularly for mindfulness practice.  · Find a mindfulness practice that works best for you. This may include one or more of the following:  ? Meditation. Meditation  involves focusing your mind on a certain thought or activity.  ? Breathing awareness exercises. These help you to stay present by focusing on your breath.  ? Body scan. For this practice, you lie down and pay attention to each part of your body from head to toe. You can identify tension and soreness and intentionally relax parts of your body.  ? Yoga. Yoga involves stretching and breathing, and it can improve your ability to move and be flexible. It can also provide an experience of testing your body's limits, which can help you release stress.  ? Mindful eating. This way of eating involves focusing on the taste, texture, color, and smell of each bite of food. Because this slows down eating and helps you feel full sooner, it can be an important part of a weight-loss plan.  · Find a podcast or recording that provides guidance for breathing awareness, body scan, or meditation exercises. You can listen to these any time when you have a free moment to rest without distractions.  · Follow your treatment plan as told by your health care provider. This may include taking regular medicines and making changes to your diet or lifestyle as recommended.  How to practice mindfulness  To do a basic awareness exercise:  · Find a comfortable place to sit.  · Pay attention to the present moment. Observe your thoughts, feelings, and surroundings just as they are.  · Avoid placing judgment on yourself, your feelings, or your surroundings. Make note of any judgment that comes up, and let it go.  · Your mind may wander, and that is okay. Make note of when your thoughts drift, and return your attention to the present moment.  To do basic mindfulness meditation:  · Find a comfortable place to sit. This may include a stable chair or a firm floor cushion.  ? Sit upright with your back straight. Let your arms fall next to your side with your hands resting on your legs.  ? If sitting in a chair, rest your feet flat on the floor.  ? If sitting  on a cushion, cross your legs in front of you.  · Keep your head in a neutral position with your chin dropped slightly. Relax your jaw and rest the tip of your tongue on the roof of your mouth. Drop your gaze to the floor. You can close your eyes if you like.  · Breathe normally and pay attention to your breath. Feel the air moving in and out of your nose. Feel your belly expanding and relaxing with each breath.  · Your mind may wander, and that is okay. Make note of when your thoughts drift, and return your attention to your breath.  · Avoid placing judgment on yourself, your feelings, or your surroundings. Make note of any judgment or feelings that come up, let them go, and bring your attention back to your breath.  · When you are ready, lift your gaze or open your eyes. Pay attention to how your body feels after the meditation.  Where to find more information  You can find more information about MBSR from:  · Your health care provider.  · Community-based meditation centers or programs.  · Programs offered near you.  Summary  · Mindfulness-based stress reduction (MBSR) is a program that teaches you how to intentionally pay attention to the present moment. It is used with other treatments to help you cope better with daily stress, emotions, and pain.  · MBSR focuses on developing self-awareness, which allows you to respond to life stress without judgment or negative emotions.  · MBSR programs may involve learning different mindfulness practices, such as breathing exercises, meditation, yoga, body scan, or mindful eating. Find a mindfulness practice that works best for you, and set aside time for it on a regular basis.  This information is not intended to replace advice given to you by your health care provider. Make sure you discuss any questions you have with your health care provider.  Document Revised: 11/30/2018 Document Reviewed: 04/26/2018  Elsevier Patient Education © 2021 Elsevier  "Inc.    https://www.nhlbi.nih.gov/files/docs/public/heart/dash_brief.pdf\">   DASH Eating Plan  DASH stands for Dietary Approaches to Stop Hypertension. The DASH eating plan is a healthy eating plan that has been shown to:  · Reduce high blood pressure (hypertension).  · Reduce your risk for type 2 diabetes, heart disease, and stroke.  · Help with weight loss.  What are tips for following this plan?  Reading food labels  · Check food labels for the amount of salt (sodium) per serving. Choose foods with less than 5 percent of the Daily Value of sodium. Generally, foods with less than 300 milligrams (mg) of sodium per serving fit into this eating plan.  · To find whole grains, look for the word \"whole\" as the first word in the ingredient list.  Shopping  · Buy products labeled as \"low-sodium\" or \"no salt added.\"  · Buy fresh foods. Avoid canned foods and pre-made or frozen meals.  Cooking  · Avoid adding salt when cooking. Use salt-free seasonings or herbs instead of table salt or sea salt. Check with your health care provider or pharmacist before using salt substitutes.  · Do not rodriguez foods. Cook foods using healthy methods such as baking, boiling, grilling, roasting, and broiling instead.  · Cook with heart-healthy oils, such as olive, canola, avocado, soybean, or sunflower oil.  Meal planning    · Eat a balanced diet that includes:  ? 4 or more servings of fruits and 4 or more servings of vegetables each day. Try to fill one-half of your plate with fruits and vegetables.  ? 6-8 servings of whole grains each day.  ? Less than 6 oz (170 g) of lean meat, poultry, or fish each day. A 3-oz (85-g) serving of meat is about the same size as a deck of cards. One egg equals 1 oz (28 g).  ? 2-3 servings of low-fat dairy each day. One serving is 1 cup (237 mL).  ? 1 serving of nuts, seeds, or beans 5 times each week.  ? 2-3 servings of heart-healthy fats. Healthy fats called omega-3 fatty acids are found in foods such as " walnuts, flaxseeds, fortified milks, and eggs. These fats are also found in cold-water fish, such as sardines, salmon, and mackerel.  · Limit how much you eat of:  ? Canned or prepackaged foods.  ? Food that is high in trans fat, such as some fried foods.  ? Food that is high in saturated fat, such as fatty meat.  ? Desserts and other sweets, sugary drinks, and other foods with added sugar.  ? Full-fat dairy products.  · Do not salt foods before eating.  · Do not eat more than 4 egg yolks a week.  · Try to eat at least 2 vegetarian meals a week.  · Eat more home-cooked food and less restaurant, buffet, and fast food.  Lifestyle  · When eating at a restaurant, ask that your food be prepared with less salt or no salt, if possible.  · If you drink alcohol:  ? Limit how much you use to:  § 0-1 drink a day for women who are not pregnant.  § 0-2 drinks a day for men.  ? Be aware of how much alcohol is in your drink. In the [place] one drink equals one 12 oz bottle of beer (355 mL), one 5 oz glass of wine (148 mL), or one 1½ oz glass of hard liquor (44 mL).  General information  · Avoid eating more than 2,300 mg of salt a day. If you have hypertension, you may need to reduce your sodium intake to 1,500 mg a day.  · Work with your health care provider to maintain a healthy body weight or to lose weight. Ask what an ideal weight is for you.  · Get at least 30 minutes of exercise that causes your heart to beat faster (aerobic exercise) most days of the week. Activities may include walking, swimming, or biking.  · Work with your health care provider or dietitian to adjust your eating plan to your individual calorie needs.  What foods should I eat?  Fruits  All fresh, dried, or frozen fruit. Canned fruit in natural juice (without added sugar).  Vegetables  Fresh or frozen vegetables (raw, steamed, roasted, or grilled). Low-sodium or reduced-sodium tomato and vegetable juice. Low-sodium or reduced-sodium tomato sauce and tomato  paste. Low-sodium or reduced-sodium canned vegetables.  Grains  Whole-grain or whole-wheat bread. Whole-grain or whole-wheat pasta. Brown rice. Oatmeal. Quinoa. Bulgur. Whole-grain and low-sodium cereals. Shyanne bread. Low-fat, low-sodium crackers. Whole-wheat flour tortillas.  Meats and other proteins  Skinless chicken or turkey. Ground chicken or turkey. Pork with fat trimmed off. Fish and seafood. Egg whites. Dried beans, peas, or lentils. Unsalted nuts, nut butters, and seeds. Unsalted canned beans. Lean cuts of beef with fat trimmed off. Low-sodium, lean precooked or cured meat, such as sausages or meat loaves.  Dairy  Low-fat (1%) or fat-free (skim) milk. Reduced-fat, low-fat, or fat-free cheeses. Nonfat, low-sodium ricotta or cottage cheese. Low-fat or nonfat yogurt. Low-fat, low-sodium cheese.  Fats and oils  Soft margarine without trans fats. Vegetable oil. Reduced-fat, low-fat, or light mayonnaise and salad dressings (reduced-sodium). Canola, safflower, olive, avocado, soybean, and sunflower oils. Avocado.  Seasonings and condiments  Herbs. Spices. Seasoning mixes without salt.  Other foods  Unsalted popcorn and pretzels. Fat-free sweets.  The items listed above may not be a complete list of foods and beverages you can eat. Contact a dietitian for more information.  What foods should I avoid?  Fruits  Canned fruit in a light or heavy syrup. Fried fruit. Fruit in cream or butter sauce.  Vegetables  Creamed or fried vegetables. Vegetables in a cheese sauce. Regular canned vegetables (not low-sodium or reduced-sodium). Regular canned tomato sauce and paste (not low-sodium or reduced-sodium). Regular tomato and vegetable juice (not low-sodium or reduced-sodium). Pickles. Olives.  Grains  Baked goods made with fat, such as croissants, muffins, or some breads. Dry pasta or rice meal packs.  Meats and other proteins  Fatty cuts of meat. Ribs. Fried meat. Bal. Bologna, salami, and other precooked or cured meats,  such as sausages or meat loaves. Fat from the back of a pig (fatback). Bratwurst. Salted nuts and seeds. Canned beans with added salt. Canned or smoked fish. Whole eggs or egg yolks. Chicken or turkey with skin.  Dairy  Whole or 2% milk, cream, and half-and-half. Whole or full-fat cream cheese. Whole-fat or sweetened yogurt. Full-fat cheese. Nondairy creamers. Whipped toppings. Processed cheese and cheese spreads.  Fats and oils  Butter. Stick margarine. Lard. Shortening. Ghee. Bal fat. Tropical oils, such as coconut, palm kernel, or palm oil.  Seasonings and condiments  Onion salt, garlic salt, seasoned salt, table salt, and sea salt. Worcestershire sauce. Tartar sauce. Barbecue sauce. Teriyaki sauce. Soy sauce, including reduced-sodium. Steak sauce. Canned and packaged gravies. Fish sauce. Oyster sauce. Cocktail sauce. Store-bought horseradish. Ketchup. Mustard. Meat flavorings and tenderizers. Bouillon cubes. Hot sauces. Pre-made or packaged marinades. Pre-made or packaged taco seasonings. Relishes. Regular salad dressings.  Other foods  Salted popcorn and pretzels.  The items listed above may not be a complete list of foods and beverages you should avoid. Contact a dietitian for more information.  Where to find more information  · National Heart, Lung, and Blood [place]: www.nhlbi.nih.gov  · American Heart Association: www.heart.org  · Academy of Nutrition and Dietetics: www.eatright.org  · National Kidney Foundation: www.kidney.org  Summary  · The DASH eating plan is a healthy eating plan that has been shown to reduce high blood pressure (hypertension). It may also reduce your risk for type 2 diabetes, heart disease, and stroke.  · When on the DASH eating plan, aim to eat more fresh fruits and vegetables, whole grains, lean proteins, low-fat dairy, and heart-healthy fats.  · With the DASH eating plan, you should limit salt (sodium) intake to 2,300 mg a day. If you have hypertension, you may need to reduce  your sodium intake to 1,500 mg a day.  · Work with your health care provider or dietitian to adjust your eating plan to your individual calorie needs.  This information is not intended to replace advice given to you by your health care provider. Make sure you discuss any questions you have with your health care provider.  Document Revised: 11/20/2020 Document Reviewed: 11/20/2020  [employer] Patient Education © 2021 [employer] Inc.

## 2021-06-18 ENCOUNTER — ANESTHESIA (OUTPATIENT)
Dept: GASTROENTEROLOGY | Facility: HOSPITAL | Age: 71
End: 2021-06-18

## 2021-06-18 ENCOUNTER — ANESTHESIA EVENT (OUTPATIENT)
Dept: GASTROENTEROLOGY | Facility: HOSPITAL | Age: 71
End: 2021-06-18

## 2021-06-18 ENCOUNTER — HOSPITAL ENCOUNTER (OUTPATIENT)
Facility: HOSPITAL | Age: 71
Setting detail: HOSPITAL OUTPATIENT SURGERY
Discharge: HOME | End: 2021-06-18
Attending: INTERNAL MEDICINE | Admitting: INTERNAL MEDICINE

## 2021-06-18 ENCOUNTER — TELEPHONE (OUTPATIENT)
Dept: INTERNAL MEDICINE | Facility: CLINIC | Age: 71
End: 2021-06-18

## 2021-06-18 VITALS
TEMPERATURE: 97.1 F | HEIGHT: 60 IN | DIASTOLIC BLOOD PRESSURE: 74 MMHG | BODY MASS INDEX: 35.34 KG/M2 | HEART RATE: 66 BPM | WEIGHT: 180 LBS | OXYGEN SATURATION: 94 % | RESPIRATION RATE: 16 BRPM | SYSTOLIC BLOOD PRESSURE: 106 MMHG

## 2021-06-18 DIAGNOSIS — Z83.71 FAMILY HISTORY OF POLYPS IN THE COLON: ICD-10-CM

## 2021-06-18 DIAGNOSIS — Z86.010 HISTORY OF ADENOMATOUS POLYP OF COLON: ICD-10-CM

## 2021-06-18 LAB
BUN SERPL-MCNC: 21 MG/DL (ref 8–23)
BUN/CREAT SERPL: 28 (ref 7–25)
CALCIUM SERPL-MCNC: 9.5 MG/DL (ref 8.6–10.5)
CHLORIDE SERPL-SCNC: 100 MMOL/L (ref 98–107)
CO2 SERPL-SCNC: 24.1 MMOL/L (ref 22–29)
CREAT SERPL-MCNC: 0.75 MG/DL (ref 0.57–1)
GLUCOSE BLDC GLUCOMTR-MCNC: 223 MG/DL (ref 70–130)
GLUCOSE SERPL-MCNC: 176 MG/DL (ref 65–99)
POTASSIUM SERPL-SCNC: 4.6 MMOL/L (ref 3.5–5.2)
SODIUM SERPL-SCNC: 141 MMOL/L (ref 136–145)

## 2021-06-18 PROCEDURE — 82962 GLUCOSE BLOOD TEST: CPT

## 2021-06-18 PROCEDURE — 88305 TISSUE EXAM BY PATHOLOGIST: CPT | Performed by: INTERNAL MEDICINE

## 2021-06-18 PROCEDURE — 25010000002 PROPOFOL 10 MG/ML EMULSION: Performed by: NURSE ANESTHETIST, CERTIFIED REGISTERED

## 2021-06-18 PROCEDURE — 45385 COLONOSCOPY W/LESION REMOVAL: CPT | Performed by: INTERNAL MEDICINE

## 2021-06-18 RX ORDER — LIDOCAINE HYDROCHLORIDE 10 MG/ML
0.5 INJECTION, SOLUTION EPIDURAL; INFILTRATION; INTRACAUDAL; PERINEURAL ONCE AS NEEDED
Status: CANCELLED | OUTPATIENT
Start: 2021-06-18

## 2021-06-18 RX ORDER — SODIUM CHLORIDE 9 MG/ML
500 INJECTION, SOLUTION INTRAVENOUS CONTINUOUS PRN
Status: DISCONTINUED | OUTPATIENT
Start: 2021-06-18 | End: 2021-06-18 | Stop reason: HOSPADM

## 2021-06-18 RX ORDER — PROPOFOL 10 MG/ML
VIAL (ML) INTRAVENOUS AS NEEDED
Status: DISCONTINUED | OUTPATIENT
Start: 2021-06-18 | End: 2021-06-18 | Stop reason: SURG

## 2021-06-18 RX ORDER — LIDOCAINE HYDROCHLORIDE 20 MG/ML
INJECTION, SOLUTION EPIDURAL; INFILTRATION; INTRACAUDAL; PERINEURAL AS NEEDED
Status: DISCONTINUED | OUTPATIENT
Start: 2021-06-18 | End: 2021-06-18 | Stop reason: SURG

## 2021-06-18 RX ORDER — SODIUM CHLORIDE 0.9 % (FLUSH) 0.9 %
10 SYRINGE (ML) INJECTION AS NEEDED
Status: DISCONTINUED | OUTPATIENT
Start: 2021-06-18 | End: 2021-06-18 | Stop reason: HOSPADM

## 2021-06-18 RX ADMIN — SODIUM CHLORIDE 500 ML: 9 INJECTION, SOLUTION INTRAVENOUS at 07:52

## 2021-06-18 RX ADMIN — LIDOCAINE HYDROCHLORIDE 30 MG: 20 INJECTION, SOLUTION EPIDURAL; INFILTRATION; INTRACAUDAL; PERINEURAL at 08:51

## 2021-06-18 RX ADMIN — PROPOFOL 250 MG: 10 INJECTION, EMULSION INTRAVENOUS at 08:51

## 2021-06-18 NOTE — ANESTHESIA PREPROCEDURE EVALUATION
Anesthesia Evaluation     Patient summary reviewed and Nursing notes reviewed   no history of anesthetic complications:  NPO Solid Status: > 8 hours  NPO Liquid Status: > 2 hours           Airway   Mallampati: I  TM distance: >3 FB  Neck ROM: full  No difficulty expected  Dental      Pulmonary    (-) asthma, sleep apnea, not a smoker  Cardiovascular   Exercise tolerance: good (4-7 METS)    Patient on routine beta blocker and Beta blocker given within 24 hours of surgery    (+) hypertension, dysrhythmias PAC, PVC, hyperlipidemia,     ROS comment: Positive stress test 2019  Subsequent cath with no CAD    Neuro/Psych  (-) seizures, TIA    ROS Comment: Pseudotumor cerebri  Arnold chiari deformity  Ventricle cyst  GI/Hepatic/Renal/Endo    (+) obesity,  GERD,  liver disease fatty liver disease, diabetes mellitus type 2,   (-) no renal disease    Musculoskeletal     Abdominal    Substance History      OB/GYN          Other                        Anesthesia Plan    ASA 3     MAC     intravenous induction     Anesthetic plan, all risks, benefits, and alternatives have been provided, discussed and informed consent has been obtained with: patient.

## 2021-06-18 NOTE — ANESTHESIA POSTPROCEDURE EVALUATION
Patient: Pauline Molina    Procedure Summary     Date: 06/18/21 Room / Location: [facility] ENDOSCOPY 6 / [facility]    Anesthesia Start: 0850 Anesthesia Stop: 0911    Procedure: COLONOSCOPY WITH ANESTHESIA (N/A ) Diagnosis:       History of adenomatous polyp of colon      Family history of polyps in the colon      (History of adenomatous polyp of colon [Z86.010])      (Family history of polyps in the colon [Z83.71])    Surgeons: Kelly Kim MD Provider: Alva Pop CRNA    Anesthesia Type: MAC ASA Status: 3          Anesthesia Type: MAC    Vitals  Vitals Value Taken Time   /62 06/18/21 0920   Temp     Pulse 64 06/18/21 0926   Resp 16 06/18/21 0915   SpO2 96 % 06/18/21 0926   Vitals shown include unvalidated device data.        Post Anesthesia Care and Evaluation    Patient location during evaluation: PHASE II  Patient participation: complete - patient participated  Level of consciousness: awake and alert  Pain management: adequate  Airway patency: patent  Anesthetic complications: No anesthetic complications  PONV Status: none  Cardiovascular status: acceptable  Respiratory status: acceptable  Hydration status: acceptable

## 2021-06-18 NOTE — TELEPHONE ENCOUNTER
----- Message from ARNALDO Rodriguez sent at 6/18/2021  1:10 PM CDT -----  Dehydration. Need to encourage water intake. Elevated blood sugar need to watch her diet as discussed.

## 2021-06-21 ENCOUNTER — TELEPHONE (OUTPATIENT)
Dept: INTERNAL MEDICINE | Facility: CLINIC | Age: 71
End: 2021-06-21

## 2021-06-21 NOTE — TELEPHONE ENCOUNTER
----- Message from ARNALDO Rodriguez sent at 6/21/2021  9:16 AM CDT -----  Positive antibody. Had COVID infection in the past. Still has antibodies.

## 2021-06-25 LAB
CYTO UR: NORMAL
LAB AP CASE REPORT: NORMAL
PATH REPORT.FINAL DX SPEC: NORMAL
PATH REPORT.GROSS SPEC: NORMAL

## 2021-07-14 ENCOUNTER — OFFICE VISIT (OUTPATIENT)
Dept: INTERNAL MEDICINE | Facility: CLINIC | Age: 71
End: 2021-07-14

## 2021-07-14 VITALS
WEIGHT: 179 LBS | HEART RATE: 70 BPM | DIASTOLIC BLOOD PRESSURE: 82 MMHG | HEIGHT: 60 IN | OXYGEN SATURATION: 97 % | BODY MASS INDEX: 35.14 KG/M2 | RESPIRATION RATE: 16 BRPM | TEMPERATURE: 97.7 F | SYSTOLIC BLOOD PRESSURE: 148 MMHG

## 2021-07-14 DIAGNOSIS — E11.9 TYPE 2 DIABETES MELLITUS WITHOUT COMPLICATION, WITHOUT LONG-TERM CURRENT USE OF INSULIN (HCC): Primary | ICD-10-CM

## 2021-07-14 DIAGNOSIS — I10 ESSENTIAL HYPERTENSION: ICD-10-CM

## 2021-07-14 LAB — HBA1C MFR BLD: 7.2 %

## 2021-07-14 PROCEDURE — 83036 HEMOGLOBIN GLYCOSYLATED A1C: CPT | Performed by: NURSE PRACTITIONER

## 2021-07-14 PROCEDURE — 3051F HG A1C>EQUAL 7.0%<8.0%: CPT | Performed by: NURSE PRACTITIONER

## 2021-07-14 PROCEDURE — 3044F HG A1C LEVEL LT 7.0%: CPT | Performed by: NURSE PRACTITIONER

## 2021-07-14 PROCEDURE — 3046F HEMOGLOBIN A1C LEVEL >9.0%: CPT | Performed by: NURSE PRACTITIONER

## 2021-07-14 PROCEDURE — 3052F HG A1C>EQUAL 8.0%<EQUAL 9.0%: CPT | Performed by: NURSE PRACTITIONER

## 2021-07-14 PROCEDURE — 99213 OFFICE O/P EST LOW 20 MIN: CPT | Performed by: NURSE PRACTITIONER

## 2021-07-14 RX ORDER — METOPROLOL TARTRATE 50 MG/1
TABLET, FILM COATED ORAL
Qty: 90 TABLET | Refills: 3 | Status: SHIPPED | OUTPATIENT
Start: 2021-07-14 | End: 2022-08-17 | Stop reason: SDUPTHER

## 2021-07-14 NOTE — PROGRESS NOTES
"Chief Complaint  Hypertension and Diabetes    Subjective          Pauline Molina presents to [facility]  Ms. Molina is a pleasant 71-year-old female who presents for follow-up on hypertension and diabetes.  Patient did bring in both blood sugar and blood pressure logs.  Patient's blood pressures at home range from 120 to 130s over 70 to 80s.  Her blood pressure in the office today is elevated but she reports little bit of anxiety about her visit today.  Patient reports that she intermittently is having headaches and notices that they mostly occur at night.  She states that they are not positional but had been taking her metoprolol differently.  Patient had previously been taking metoprolol 50 twice a day and now had been taking metoprolol once a day.  She reports in the afternoons noticing that her blood pressure goes up related to this.  She is very resistant to taking any new medications and is concerned about the metoprolol she had noticed her heart rate dropping into the low 50s and she felt \"lightheaded \".  She denies any chest pain, shortness of breath, exercise intolerance, blurred vision, or lower extremity swelling.  She chronically has palpitations but rarely has any pain with this.  She had had a echo and stress test completed in 2019 which did not show any abnormalities per patient.  She is currently taking amlodipine 2.5 mg, losartan 100, metoprolol 50 once a day.    Patient's blood sugars are on average between 100 5280.  She does report that this is an improvement from her blood sugars being 1  more often.  Patient states that she has been working on her diet.  She has eliminated concentrated sweets and simple carbohydrates.  She states that she has been eating a lot more whole-grain food, fruits, vegetables.  She is reduced her salt intake as well.  She is also been working on increasing her hydration.  She says most days she gets 6 glasses of water a day.  She " "denies any numbness tingling in her feet.  She denies episodes of hypoglycemia.  She reports taking her Metformin 500.  At her last visit I had discussed with her the benefits of increasing Metformin to either 1000 once a day or 500 twice a day patient is very resistant to this and wants to continue to work on her diet and if her A1c is not less than 7 by the next follow-up she would be agreeable to taking Metformin twice a day.      Objective   Vital Signs:   /82 (BP Location: Left arm, Patient Position: Sitting, Cuff Size: Adult)   Pulse 70   Temp 97.7 °F (36.5 °C) (Infrared)   Resp 16   Ht 152.4 cm (60\")   Wt 81.2 kg (179 lb)   SpO2 97%   BMI 34.96 kg/m²     Physical Exam  Vitals and nursing note reviewed.   Constitutional:       Appearance: She is well-developed.   HENT:      Head: Normocephalic and atraumatic.      Right Ear: External ear normal.      Left Ear: External ear normal.      Nose: Nose normal.   Eyes:      Conjunctiva/sclera: Conjunctivae normal.      Pupils: Pupils are equal, round, and reactive to light.   Neck:      Thyroid: No thyromegaly.   Cardiovascular:      Rate and Rhythm: Normal rate and regular rhythm. Frequent extrasystoles are present.     Pulses: Normal pulses.      Heart sounds: Normal heart sounds.   Pulmonary:      Effort: Pulmonary effort is normal.      Breath sounds: Normal breath sounds.   Abdominal:      General: Bowel sounds are normal.      Palpations: Abdomen is soft.   Musculoskeletal:      Cervical back: Normal range of motion and neck supple.      Right lower leg: No edema.      Left lower leg: No edema.   Lymphadenopathy:      Cervical: No cervical adenopathy.   Skin:     General: Skin is warm and dry.   Neurological:      Mental Status: She is alert and oriented to person, place, and time.   Psychiatric:         Behavior: Behavior normal.         Thought Content: Thought content normal.        Result Review :     Common labs    Common Labsle 3/22/21 " 6/17/21 6/17/21 7/14/21     1021 1048    Glucose  176 (A)     BUN  21     Creatinine  0.75     eGFR Non  Am  77     eGFR African Am  93     Sodium  141     Potassium  4.6     Chloride  100     Calcium  9.5     Hemoglobin A1C 7.6  7.7 7.2   (A) Abnormal value       Comments are available for some flowsheets but are not being displayed.           Data reviewed: Cardiology studies Lexiscan 2019 indicated possible 8% myocardium ischemia. Heart cath completed and negative for occlusion and no PCI indicated.           Assessment and Plan {CC Problem List  Visit Diagnosis   ROS  Review (Popup)  [facility] Maintenance  Quality  BestPractice  Medications  SmartSets  SnapShot Encounters  Media :23}   Diagnoses and all orders for this visit:    1. Type 2 diabetes mellitus without complication, without long-term current use of insulin (CMS/[facility]) (Primary)  -     POC Glycosylated Hemoglobin (Hb A1C)    2. Essential hypertension  -     metoprolol tartrate (LOPRESSOR) 50 MG tablet; Take 1/2 tablet in the AM and 1/2 tablet in the PM  Dispense: 90 tablet; Refill: 3      I spent 20 minutes caring for Pauline on this date of service. This time includes time spent by me in the following activities:preparing for the visit, reviewing tests, obtaining and/or reviewing a separately obtained history, performing a medically appropriate examination and/or evaluation , counseling and educating the patient/family/caregiver, ordering medications, tests, or procedures, referring and communicating with other health care professionals , documenting information in the medical record, independently interpreting results and communicating that information with the patient/family/caregiver and care coordination  Follow Up   Return in about 3 months (around 10/14/2021) for Recheck DM.     Patient is most likely having palpitations because she is only taking metoprolol tartrate once a day.  Vies her to take 25 mg once in the morning once in  the evening and see if this helps with her palpitations and blood pressure.  We will have her continue to take Norvasc 2.5 and losartan 100.  Advised her that most likely will need to increase her amlodipine back up to 5.  We will have her continue working on DASH diet and increase her exercise as tolerated.  30 minutes of walking 5 days a week is advised.    We will continue her on Metformin 500 for now.  We will check her A1c in 3 months if less than 7 can continue her on current prescription if no improvement or increase we will put her on Metformin 500 twice a day.  Educated her again on ADA diet guidelines.  Patient verbalized understanding.  Patient was given instructions and counseling regarding her condition or for health maintenance advice. Please see specific information pulled into the AVS if appropriate.

## 2021-07-14 NOTE — PATIENT INSTRUCTIONS
Diabetes Mellitus and Nutrition, Adult  When you have diabetes, or diabetes mellitus, it is very important to have healthy eating habits because your blood sugar (glucose) levels are greatly affected by what you eat and drink. Eating healthy foods in the right amounts, at about the same times every day, can help you:  · Control your blood glucose.  · Lower your risk of heart disease.  · Improve your blood pressure.  · Reach or maintain a healthy weight.  What can affect my meal plan?  Every person with diabetes is different, and each person has different needs for a meal plan. Your health care provider may recommend that you work with a dietitian to make a meal plan that is best for you. Your meal plan may vary depending on factors such as:  · The calories you need.  · The medicines you take.  · Your weight.  · Your blood glucose, blood pressure, and cholesterol levels.  · Your activity level.  · Other health conditions you have, such as heart or kidney disease.  How do carbohydrates affect me?  Carbohydrates, also called carbs, affect your blood glucose level more than any other type of food. Eating carbs naturally raises the amount of glucose in your blood. Carb counting is a method for keeping track of how many carbs you eat. Counting carbs is important to keep your blood glucose at a healthy level, especially if you use insulin or take certain oral diabetes medicines.  It is important to know how many carbs you can safely have in each meal. This is different for every person. Your dietitian can help you calculate how many carbs you should have at each meal and for each snack.  How does alcohol affect me?  Alcohol can cause a sudden decrease in blood glucose (hypoglycemia), especially if you use insulin or take certain oral diabetes medicines. Hypoglycemia can be a life-threatening condition. Symptoms of hypoglycemia, such as sleepiness, dizziness, and confusion, are similar to symptoms of having too much  "alcohol.  · Do not drink alcohol if:  ? Your health care provider tells you not to drink.  ? You are pregnant, may be pregnant, or are planning to become pregnant.  · If you drink alcohol:  ? Do not drink on an empty stomach.  ? Limit how much you use to:  § 0-1 drink a day for women.  § 0-2 drinks a day for men.  ? Be aware of how much alcohol is in your drink. In the [place] one drink equals one 12 oz bottle of beer (355 mL), one 5 oz glass of wine (148 mL), or one 1½ oz glass of hard liquor (44 mL).  ? Keep yourself hydrated with water, diet soda, or unsweetened iced tea.  § Keep in mind that regular soda, juice, and other mixers may contain a lot of sugar and must be counted as carbs.  What are tips for following this plan?    Reading food labels  · Start by checking the serving size on the \"Nutrition Facts\" label of packaged foods and drinks. The amount of calories, carbs, fats, and other nutrients listed on the label is based on one serving of the item. Many items contain more than one serving per package.  · Check the total grams (g) of carbs in one serving. You can calculate the number of servings of carbs in one serving by dividing the total carbs by 15. For example, if a food has 30 g of total carbs per serving, it would be equal to 2 servings of carbs.  · Check the number of grams (g) of saturated fats and trans fats in one serving. Choose foods that have a low amount or none of these fats.  · Check the number of milligrams (mg) of salt (sodium) in one serving. Most people should limit total sodium intake to less than 2,300 mg per day.  · Always check the nutrition information of foods labeled as \"low-fat\" or \"nonfat.\" These foods may be higher in added sugar or refined carbs and should be avoided.  · Talk to your dietitian to identify your daily goals for nutrients listed on the label.  Shopping  · Avoid buying canned, pre-made, or processed foods. These foods tend to be high in fat, sodium, and added " sugar.  · Shop around the outside edge of the grocery store. This is where you will most often find fresh fruits and vegetables, bulk grains, fresh meats, and fresh dairy.  Cooking  · Use low-heat cooking methods, such as baking, instead of high-heat cooking methods like deep frying.  · Cook using healthy oils, such as olive, canola, or sunflower oil.  · Avoid cooking with butter, cream, or high-fat meats.  Meal planning  · Eat meals and snacks regularly, preferably at the same times every day. Avoid going long periods of time without eating.  · Eat foods that are high in fiber, such as fresh fruits, vegetables, beans, and whole grains. Talk with your dietitian about how many servings of carbs you can eat at each meal.  · Eat 4-6 oz (112-168 g) of lean protein each day, such as lean meat, chicken, fish, eggs, or tofu. One ounce (oz) of lean protein is equal to:  ? 1 oz (28 g) of meat, chicken, or fish.  ? 1 egg.  ? ¼ cup (62 g) of tofu.  · Eat some foods each day that contain healthy fats, such as avocado, nuts, seeds, and fish.  What foods should I eat?  Fruits  Berries. Apples. Oranges. Peaches. Apricots. Plums. Grapes. Misael. Papaya. Pomegranate. Kiwi. Cherries.  Vegetables  Lettuce. Spinach. Leafy greens, including kale, chard, conrad greens, and mustard greens. Beets. Cauliflower. Cabbage. Broccoli. Carrots. Green beans. Tomatoes. Peppers. Onions. Cucumbers. Teaberry sprouts.  Grains  Whole grains, such as whole-wheat or whole-grain bread, crackers, tortillas, cereal, and pasta. Unsweetened oatmeal. Quinoa. Brown or wild rice.  Meats and other proteins  Seafood. Poultry without skin. Lean cuts of poultry and beef. Tofu. Nuts. Seeds.  Dairy  Low-fat or fat-free dairy products such as milk, yogurt, and cheese.  The items listed above may not be a complete list of foods and beverages you can eat. Contact a dietitian for more information.  What foods should I avoid?  Fruits  Fruits canned with  syrup.  Vegetables  Canned vegetables. Frozen vegetables with butter or cream sauce.  Grains  Refined white flour and flour products such as bread, pasta, snack foods, and cereals. Avoid all processed foods.  Meats and other proteins  Fatty cuts of meat. Poultry with skin. Breaded or fried meats. Processed meat. Avoid saturated fats.  Dairy  Full-fat yogurt, cheese, or milk.  Beverages  Sweetened drinks, such as soda or iced tea.  The items listed above may not be a complete list of foods and beverages you should avoid. Contact a dietitian for more information.  Questions to ask a health care provider  · Do I need to meet with a diabetes educator?  · Do I need to meet with a dietitian?  · What number can I call if I have questions?  · When are the best times to check my blood glucose?  Where to find more information:  · American Diabetes Association: diabetes.org  · Academy of Nutrition and Dietetics: www.eatright.org  · National [place] of Diabetes and Digestive and Kidney Diseases: www.niddk.nih.gov  · Association of Diabetes Care and Education Specialists: www.diabeteseducator.org  Summary  · It is important to have healthy eating habits because your blood sugar (glucose) levels are greatly affected by what you eat and drink.  · A healthy meal plan will help you control your blood glucose and maintain a healthy lifestyle.  · Your health care provider may recommend that you work with a dietitian to make a meal plan that is best for you.  · Keep in mind that carbohydrates (carbs) and alcohol have immediate effects on your blood glucose levels. It is important to count carbs and to use alcohol carefully.  This information is not intended to replace advice given to you by your health care provider. Make sure you discuss any questions you have with your health care provider.  Document Revised: 11/24/2020 Document Reviewed: 11/24/2020  Elsevier Patient Education © 2021 Elsevier  "Inc[employer]      https://www.nhlbi.nih.gov/files/docs/public/heart/dash_brief.pdf\">   DASH Eating Plan  DASH stands for Dietary Approaches to Stop Hypertension. The DASH eating plan is a healthy eating plan that has been shown to:  Reduce high blood pressure (hypertension).  Reduce your risk for type 2 diabetes, heart disease, and stroke.  Help with weight loss.  What are tips for following this plan?  Reading food labels  Check food labels for the amount of salt (sodium) per serving. Choose foods with less than 5 percent of the Daily Value of sodium. Generally, foods with less than 300 milligrams (mg) of sodium per serving fit into this eating plan.  To find whole grains, look for the word \"whole\" as the first word in the ingredient list.  Shopping  Buy products labeled as \"low-sodium\" or \"no salt added.\"  Buy fresh foods. Avoid canned foods and pre-made or frozen meals.  Cooking  Avoid adding salt when cooking. Use salt-free seasonings or herbs instead of table salt or sea salt. Check with your health care provider or pharmacist before using salt substitutes.  Do not rodriguez foods. Cook foods using healthy methods such as baking, boiling, grilling, roasting, and broiling instead.  Cook with heart-healthy oils, such as olive, canola, avocado, soybean, or sunflower oil.  Meal planning    Eat a balanced diet that includes:  4 or more servings of fruits and 4 or more servings of vegetables each day. Try to fill one-half of your plate with fruits and vegetables.  6-8 servings of whole grains each day.  Less than 6 oz (170 g) of lean meat, poultry, or fish each day. A 3-oz (85-g) serving of meat is about the same size as a deck of cards. One egg equals 1 oz (28 g).  2-3 servings of low-fat dairy each day. One serving is 1 cup (237 mL).  1 serving of nuts, seeds, or beans 5 times each week.  2-3 servings of heart-healthy fats. Healthy fats called omega-3 fatty acids are found in foods such as walnuts, flaxseeds, fortified milks, " and eggs. These fats are also found in cold-water fish, such as sardines, salmon, and mackerel.  Limit how much you eat of:  Canned or prepackaged foods.  Food that is high in trans fat, such as some fried foods.  Food that is high in saturated fat, such as fatty meat.  Desserts and other sweets, sugary drinks, and other foods with added sugar.  Full-fat dairy products.  Do not salt foods before eating.  Do not eat more than 4 egg yolks a week.  Try to eat at least 2 vegetarian meals a week.  Eat more home-cooked food and less restaurant, buffet, and fast food.  Lifestyle  When eating at a restaurant, ask that your food be prepared with less salt or no salt, if possible.  If you drink alcohol:  Limit how much you use to:  0-1 drink a day for women who are not pregnant.  0-2 drinks a day for men.  Be aware of how much alcohol is in your drink. In the [place] one drink equals one 12 oz bottle of beer (355 mL), one 5 oz glass of wine (148 mL), or one 1½ oz glass of hard liquor (44 mL).  General information  Avoid eating more than 2,300 mg of salt a day. If you have hypertension, you may need to reduce your sodium intake to 1,500 mg a day.  Work with your health care provider to maintain a healthy body weight or to lose weight. Ask what an ideal weight is for you.  Get at least 30 minutes of exercise that causes your heart to beat faster (aerobic exercise) most days of the week. Activities may include walking, swimming, or biking.  Work with your health care provider or dietitian to adjust your eating plan to your individual calorie needs.  What foods should I eat?  Fruits  All fresh, dried, or frozen fruit. Canned fruit in natural juice (without added sugar).  Vegetables  Fresh or frozen vegetables (raw, steamed, roasted, or grilled). Low-sodium or reduced-sodium tomato and vegetable juice. Low-sodium or reduced-sodium tomato sauce and tomato paste. Low-sodium or reduced-sodium canned vegetables.  Grains  Whole-grain or  whole-wheat bread. Whole-grain or whole-wheat pasta. Brown rice. Oatmeal. Quinoa. Bulgur. Whole-grain and low-sodium cereals. Shyanne bread. Low-fat, low-sodium crackers. Whole-wheat flour tortillas.  Meats and other proteins  Skinless chicken or turkey. Ground chicken or turkey. Pork with fat trimmed off. Fish and seafood. Egg whites. Dried beans, peas, or lentils. Unsalted nuts, nut butters, and seeds. Unsalted canned beans. Lean cuts of beef with fat trimmed off. Low-sodium, lean precooked or cured meat, such as sausages or meat loaves.  Dairy  Low-fat (1%) or fat-free (skim) milk. Reduced-fat, low-fat, or fat-free cheeses. Nonfat, low-sodium ricotta or cottage cheese. Low-fat or nonfat yogurt. Low-fat, low-sodium cheese.  Fats and oils  Soft margarine without trans fats. Vegetable oil. Reduced-fat, low-fat, or light mayonnaise and salad dressings (reduced-sodium). Canola, safflower, olive, avocado, soybean, and sunflower oils. Avocado.  Seasonings and condiments  Herbs. Spices. Seasoning mixes without salt.  Other foods  Unsalted popcorn and pretzels. Fat-free sweets.  The items listed above may not be a complete list of foods and beverages you can eat. Contact a dietitian for more information.  What foods should I avoid?  Fruits  Canned fruit in a light or heavy syrup. Fried fruit. Fruit in cream or butter sauce.  Vegetables  Creamed or fried vegetables. Vegetables in a cheese sauce. Regular canned vegetables (not low-sodium or reduced-sodium). Regular canned tomato sauce and paste (not low-sodium or reduced-sodium). Regular tomato and vegetable juice (not low-sodium or reduced-sodium). Pickles. Olives.  Grains  Baked goods made with fat, such as croissants, muffins, or some breads. Dry pasta or rice meal packs.  Meats and other proteins  Fatty cuts of meat. Ribs. Fried meat. Bal. Bologna, salami, and other precooked or cured meats, such as sausages or meat loaves. Fat from the back of a pig (fatback).  Bratwurst. Salted nuts and seeds. Canned beans with added salt. Canned or smoked fish. Whole eggs or egg yolks. Chicken or turkey with skin.  Dairy  Whole or 2% milk, cream, and half-and-half. Whole or full-fat cream cheese. Whole-fat or sweetened yogurt. Full-fat cheese. Nondairy creamers. Whipped toppings. Processed cheese and cheese spreads.  Fats and oils  Butter. Stick margarine. Lard. Shortening. Ghee. Bal fat. Tropical oils, such as coconut, palm kernel, or palm oil.  Seasonings and condiments  Onion salt, garlic salt, seasoned salt, table salt, and sea salt. Worcestershire sauce. Tartar sauce. Barbecue sauce. Teriyaki sauce. Soy sauce, including reduced-sodium. Steak sauce. Canned and packaged gravies. Fish sauce. Oyster sauce. Cocktail sauce. Store-bought horseradish. Ketchup. Mustard. Meat flavorings and tenderizers. Bouillon cubes. Hot sauces. Pre-made or packaged marinades. Pre-made or packaged taco seasonings. Relishes. Regular salad dressings.  Other foods  Salted popcorn and pretzels.  The items listed above may not be a complete list of foods and beverages you should avoid. Contact a dietitian for more information.  Where to find more information  National Heart, Lung, and Blood [place]: www.nhlbi.nih.gov  American Heart Association: www.heart.org  Academy of Nutrition and Dietetics: www.eatright.org  National Kidney Foundation: www.kidney.org  Summary  The DASH eating plan is a healthy eating plan that has been shown to reduce high blood pressure (hypertension). It may also reduce your risk for type 2 diabetes, heart disease, and stroke.  When on the DASH eating plan, aim to eat more fresh fruits and vegetables, whole grains, lean proteins, low-fat dairy, and heart-healthy fats.  With the DASH eating plan, you should limit salt (sodium) intake to 2,300 mg a day. If you have hypertension, you may need to reduce your sodium intake to 1,500 mg a day.  Work with your health care provider or  dietitian to adjust your eating plan to your individual calorie needs.  This information is not intended to replace advice given to you by your health care provider. Make sure you discuss any questions you have with your health care provider.  Document Revised: 11/20/2020 Document Reviewed: 11/20/2020  Elsevier Patient Education © 2021 [employer]

## 2021-08-12 ENCOUNTER — TRANSCRIBE ORDERS (OUTPATIENT)
Dept: LAB | Facility: HOSPITAL | Age: 71
End: 2021-08-12

## 2021-08-12 DIAGNOSIS — Z01.818 PREOPERATIVE TESTING: Primary | ICD-10-CM

## 2021-08-16 ENCOUNTER — LAB (OUTPATIENT)
Dept: LAB | Facility: HOSPITAL | Age: 71
End: 2021-08-16

## 2021-08-16 DIAGNOSIS — Z01.818 PREOPERATIVE TESTING: ICD-10-CM

## 2021-08-16 LAB — SARS-COV-2 ORF1AB RESP QL NAA+PROBE: NOT DETECTED

## 2021-08-16 PROCEDURE — U0004 COV-19 TEST NON-CDC HGH THRU: HCPCS

## 2021-08-16 PROCEDURE — C9803 HOPD COVID-19 SPEC COLLECT: HCPCS

## 2021-08-31 ENCOUNTER — TRANSCRIBE ORDERS (OUTPATIENT)
Dept: LAB | Facility: HOSPITAL | Age: 71
End: 2021-08-31

## 2021-08-31 DIAGNOSIS — Z01.812 ENCOUNTER FOR PREOPERATIVE SCREENING LABORATORY TESTING FOR COVID-19 VIRUS: Primary | ICD-10-CM

## 2021-08-31 DIAGNOSIS — Z20.822 ENCOUNTER FOR PREOPERATIVE SCREENING LABORATORY TESTING FOR COVID-19 VIRUS: Primary | ICD-10-CM

## 2021-09-06 ENCOUNTER — LAB (OUTPATIENT)
Dept: LAB | Facility: HOSPITAL | Age: 71
End: 2021-09-06

## 2021-09-06 LAB — SARS-COV-2 ORF1AB RESP QL NAA+PROBE: NOT DETECTED

## 2021-09-06 PROCEDURE — C9803 HOPD COVID-19 SPEC COLLECT: HCPCS | Performed by: OPHTHALMOLOGY

## 2021-09-06 PROCEDURE — U0004 COV-19 TEST NON-CDC HGH THRU: HCPCS | Performed by: OPHTHALMOLOGY

## 2021-09-22 RX ORDER — LOSARTAN POTASSIUM 100 MG/1
100 TABLET ORAL DAILY
Qty: 90 TABLET | Refills: 3 | Status: SHIPPED | OUTPATIENT
Start: 2021-09-22 | End: 2022-10-03 | Stop reason: SDUPTHER

## 2021-10-13 ENCOUNTER — OFFICE VISIT (OUTPATIENT)
Dept: INTERNAL MEDICINE | Facility: CLINIC | Age: 71
End: 2021-10-13

## 2021-10-13 VITALS
SYSTOLIC BLOOD PRESSURE: 134 MMHG | OXYGEN SATURATION: 98 % | HEIGHT: 60 IN | HEART RATE: 75 BPM | TEMPERATURE: 97.5 F | DIASTOLIC BLOOD PRESSURE: 72 MMHG | BODY MASS INDEX: 35.14 KG/M2 | WEIGHT: 179 LBS

## 2021-10-13 DIAGNOSIS — R42 DIZZINESS: ICD-10-CM

## 2021-10-13 DIAGNOSIS — R00.2 PALPITATION: ICD-10-CM

## 2021-10-13 DIAGNOSIS — E11.9 TYPE 2 DIABETES MELLITUS WITHOUT COMPLICATION, WITHOUT LONG-TERM CURRENT USE OF INSULIN (HCC): Primary | ICD-10-CM

## 2021-10-13 DIAGNOSIS — I10 PRIMARY HYPERTENSION: ICD-10-CM

## 2021-10-13 DIAGNOSIS — J30.89 NON-SEASONAL ALLERGIC RHINITIS DUE TO OTHER ALLERGIC TRIGGER: ICD-10-CM

## 2021-10-13 DIAGNOSIS — H93.8X3 CONGESTION OF BOTH EARS: ICD-10-CM

## 2021-10-13 PROBLEM — IMO0002 DEGENERATION OF INTERVERTEBRAL DISC: Status: ACTIVE | Noted: 2021-10-13

## 2021-10-13 PROBLEM — G93.2 PSEUDOTUMOR CEREBRI: Status: ACTIVE | Noted: 2021-10-13

## 2021-10-13 PROBLEM — M79.10 MUSCLE PAIN: Status: ACTIVE | Noted: 2021-10-13

## 2021-10-13 PROBLEM — M79.604 RIGHT LEG PAIN: Status: ACTIVE | Noted: 2021-10-13

## 2021-10-13 PROBLEM — R07.9 CHEST PAIN: Status: ACTIVE | Noted: 2019-07-08

## 2021-10-13 PROBLEM — Z12.31 ENCOUNTER FOR SCREENING MAMMOGRAM FOR MALIGNANT NEOPLASM OF BREAST: Status: ACTIVE | Noted: 2021-10-13

## 2021-10-13 LAB
EXPIRATION DATE: NORMAL
HBA1C MFR BLD: 6.8 %
Lab: NORMAL

## 2021-10-13 PROCEDURE — 3044F HG A1C LEVEL LT 7.0%: CPT | Performed by: NURSE PRACTITIONER

## 2021-10-13 PROCEDURE — 93000 ELECTROCARDIOGRAM COMPLETE: CPT | Performed by: NURSE PRACTITIONER

## 2021-10-13 PROCEDURE — 99214 OFFICE O/P EST MOD 30 MIN: CPT | Performed by: NURSE PRACTITIONER

## 2021-10-13 PROCEDURE — 83036 HEMOGLOBIN GLYCOSYLATED A1C: CPT | Performed by: NURSE PRACTITIONER

## 2021-10-13 RX ORDER — LANOLIN ALCOHOL/MO/W.PET/CERES
1000 CREAM (GRAM) TOPICAL DAILY
COMMUNITY

## 2021-10-13 RX ORDER — MAGNESIUM OXIDE 400 MG/1
400 TABLET ORAL DAILY
COMMUNITY

## 2021-10-13 NOTE — PROGRESS NOTES
Subjective   Pauline Molina is a 71 y.o. female.   Chief Complaint   Patient presents with   • Diabetes     6.8   • Dizziness     possible fluid in ears. Says she has been using nasal spray and allergy meds and it has helped some.        Ms. Molina presents for hypertension and diabetes follow up.  She is reporting dizziness and ear fullness, but also reporting dizziness and slight increase in palpation. Patient has completed and thorough cardiac work up by Dr. Lezama and is monitored at [facility]. She denies chest pain, exercise intolerance, or lower extremity edema. She report dizziness does improve with using nasal spray she thinks, but is unsure.     Diabetes  She presents for her follow-up diabetic visit. She has type 2 diabetes mellitus. Her disease course has been improving. Hypoglycemia symptoms include dizziness. Pertinent negatives for hypoglycemia include no confusion or nervousness/anxiousness. Pertinent negatives for diabetes include no blurred vision, no chest pain, no fatigue, no polydipsia, no polyphagia, no weakness and no weight loss. Diabetic complications include heart disease. Risk factors for coronary artery disease include diabetes mellitus, dyslipidemia, family history, obesity, hypertension, sedentary lifestyle and post-menopausal. Current diabetic treatment includes diet and oral agent (monotherapy). She is compliant with treatment most of the time. She is following a generally healthy diet. Meal planning includes avoidance of concentrated sweets. She has not had a previous visit with a dietitian. She rarely participates in exercise. Her home blood glucose trend is fluctuating minimally. An ACE inhibitor/angiotensin II receptor blocker is being taken. She does not see a podiatrist.Eye exam is not current.   Hypertension  This is a chronic problem. The current episode started more than 1 year ago. The problem has been waxing and waning since onset. The problem is controlled.  Pertinent negatives include no blurred vision, chest pain, neck pain, palpitations or shortness of breath. There are no associated agents to hypertension. Risk factors for coronary artery disease include diabetes mellitus, dyslipidemia, obesity, post-menopausal state, sedentary lifestyle and stress. Past treatments include ACE inhibitors, alpha 1 blockers, central alpha agonists, diuretics and lifestyle changes. Current antihypertension treatment includes angiotensin blockers, calcium channel blockers and lifestyle changes. The current treatment provides moderate improvement. Compliance problems include exercise and diet.         The following portions of the patient's history were reviewed and updated as appropriate: allergies, current medications, past family history, past medical history, past social history, past surgical history and problem list.    Review of Systems   Constitutional: Negative for activity change, appetite change, fatigue, fever, unexpected weight gain and unexpected weight loss.   HENT: Positive for congestion, rhinorrhea, sinus pressure and sneezing. Negative for postnasal drip, sore throat, swollen glands, trouble swallowing and voice change.    Eyes: Negative for blurred vision and visual disturbance.   Respiratory: Negative for cough, chest tightness and shortness of breath.    Cardiovascular: Negative for chest pain, palpitations and leg swelling.   Gastrointestinal: Negative for abdominal pain, constipation, diarrhea, nausea, vomiting and indigestion.   Endocrine: Negative for cold intolerance, heat intolerance, polydipsia and polyphagia.   Genitourinary: Negative for dysuria and frequency.   Musculoskeletal: Negative for arthralgias, back pain, joint swelling and neck pain.   Skin: Negative for color change, rash and skin lesions.   Allergic/Immunologic: Positive for environmental allergies.   Neurological: Positive for dizziness. Negative for weakness, headache, memory problem and  confusion.   Hematological: Does not bruise/bleed easily.   Psychiatric/Behavioral: Positive for stress. Negative for agitation, hallucinations and suicidal ideas. The patient is not nervous/anxious.        Objective   Past Medical History:   Diagnosis Date   • Arrhythmia    • Family history of colonic polyps    • GERD (gastroesophageal reflux disease)    • History of adenomatous polyp of colon    • History of colon polyps    • Hyperlipidemia    • Hypertension    • MVP (mitral valve prolapse)    • Pseudotumor cerebri    • Type 2 diabetes mellitus (HCC)       Past Surgical History:   Procedure Laterality Date   • CARDIAC CATHETERIZATION     •  SECTION     • CHOLECYSTECTOMY     • COLONOSCOPY  2015    One 3-4mm hyperplastic polyp in the rectum; Repeat 5 years   • COLONOSCOPY  2007    Normal; Repeat 3-4 years   • COLONOSCOPY  2004    One 2cm pedunculated erythematous adenomatous polyp in the sigmoid colon; One 6mm hyperplastic polyp in the rectum; Repeat 3 years   • COLONOSCOPY N/A 2021    Procedure: COLONOSCOPY WITH ANESTHESIA;  Surgeon: Kelly Kim MD;  Location: [facility] ENDOSCOPY;  Service: Gastroenterology;  Laterality: N/A;  pre op: hx polyps  post op:polyp  PCP: Jayla Null APRN   • DILATATION AND CURETTAGE     • ENDOSCOPY  2009    Normal endoscopy   • ENDOSCOPY  2004    GERD   • HERNIA REPAIR     • HYSTERECTOMY     • LAPAROSCOPIC LYSIS OF ADHESIONS     • OOPHORECTOMY     • OTHER SURGICAL HISTORY  2010    EUS-Dr. Hastings-Normal EUS evaluation of the pancreas, bile duct, and ampulla of Vater-pancreatitis remains idiopathic        Current Outpatient Medications:   •  amLODIPine (NORVASC) 5 MG tablet, Take 1 tablet by mouth Daily. (Patient taking differently: Take 2.5 mg by mouth 2 (two) times a day.), Disp: 30 tablet, Rfl: 11  •  ASPIRIN 81 PO, Take 1 capsule by mouth Daily., Disp: , Rfl:   •  Barberry-Oreg Grape-Goldenseal (BERBERINE COMPLEX PO),  Take 1 capsule by mouth Daily., Disp: , Rfl:   •  Coenzyme Q10 (CoQ-10) 100 MG capsule, Take 1 capsule by mouth Daily., Disp: , Rfl:   •  Fenugreek 500 MG capsule, Take 1 capsule by mouth Daily., Disp: , Rfl:   •  losartan (COZAAR) 100 MG tablet, Take 1 tablet by mouth Daily., Disp: 90 tablet, Rfl: 3  •  magnesium oxide (MAG-OX) 400 MG tablet, Take 400 mg by mouth Daily., Disp: , Rfl:   •  metFORMIN ER (GLUCOPHAGE-XR) 500 MG 24 hr tablet, Take 1 tablet by mouth Daily With Breakfast., Disp: 90 tablet, Rfl: 3  •  metoprolol tartrate (LOPRESSOR) 50 MG tablet, Take 1/2 tablet in the AM and 1/2 tablet in the PM, Disp: 90 tablet, Rfl: 3  •  vitamin B-12 (CYANOCOBALAMIN) 1000 MCG tablet, Take 1,000 mcg by mouth Daily. Spray, Disp: , Rfl:   •  vitamin D3 125 MCG (5000 UT) capsule capsule, Take 1 capsule by mouth Daily., Disp: , Rfl:       Vitals:    10/13/21 1351   BP: 134/72   Pulse: 75   Temp: 97.5 °F (36.4 °C)   SpO2: 98%         10/13/21  1351   Weight: 81.2 kg (179 lb)       Body mass index is 34.96 kg/m².    Physical Exam  Vitals and nursing note reviewed.   Constitutional:       Appearance: Normal appearance. She is well-developed and well-groomed. She is obese.   HENT:      Head: Normocephalic and atraumatic.      Right Ear: Hearing and external ear normal. No swelling or tenderness. Tympanic membrane is injected and bulging.      Left Ear: Hearing and external ear normal. No swelling or tenderness. Tympanic membrane is injected and bulging.      Nose: Congestion present.      Mouth/Throat:      Lips: Pink.      Mouth: Mucous membranes are moist.      Pharynx: Oropharynx is clear. Uvula midline.   Eyes:      General: Lids are normal. Lids are everted, no foreign bodies appreciated. Vision grossly intact.      Conjunctiva/sclera: Conjunctivae normal.      Pupils: Pupils are equal, round, and reactive to light.   Neck:      Thyroid: No thyromegaly.      Vascular: No carotid bruit.   Cardiovascular:      Rate and  Rhythm: Normal rate. Rhythm irregular. Frequent extrasystoles are present.     Pulses: Normal pulses.      Heart sounds: Normal heart sounds. No murmur heard.      Pulmonary:      Effort: Pulmonary effort is normal.      Breath sounds: Normal breath sounds.   Abdominal:      General: Bowel sounds are normal.      Palpations: Abdomen is soft.   Musculoskeletal:      Cervical back: Normal range of motion and neck supple.      Right lower leg: No edema.      Left lower leg: No edema.   Lymphadenopathy:      Head:      Right side of head: No submental, submandibular, tonsillar, preauricular, posterior auricular or occipital adenopathy.      Left side of head: No submental, submandibular, tonsillar, preauricular, posterior auricular or occipital adenopathy.      Cervical: No cervical adenopathy.   Skin:     General: Skin is warm and dry.      Capillary Refill: Capillary refill takes less than 2 seconds.   Neurological:      General: No focal deficit present.      Mental Status: She is alert and oriented to person, place, and time.      Deep Tendon Reflexes: Reflexes are normal and symmetric.   Psychiatric:         Attention and Perception: Attention normal.         Mood and Affect: Mood normal.         Speech: Speech normal.         Behavior: Behavior normal. Behavior is cooperative.         Thought Content: Thought content normal.         ECG 12 Lead    Date/Time: 10/13/2021 4:21 PM  Performed by: Jayla Null APRN  Authorized by: Jayla Null APRN   Rhythm: sinus rhythm  Ectopy: infrequent PVCs  Conduction: incomplete right bundle branch block  Other findings: T wave abnormality    Clinical impression: non-specific ECG                Assessment/Plan   Diagnoses and all orders for this visit:    1. Type 2 diabetes mellitus without complication, without long-term current use of insulin (HCC) (Primary)  -     POC Glycosylated Hemoglobin (Hb A1C)    2. Primary hypertension  -     ECG 12  "Lead    3. Dizziness  -     ECG 12 Lead    4. Congestion of both ears    5. Non-seasonal allergic rhinitis due to other allergic trigger    6. Palpitation  -     ECG 12 Lead    Patient will use her allergy medicine everyday and increase use of nasal spray to daily. She prefers nasal spray and oral allergy medicine over prednisone.      Patient's EKG does not show anything new. Will have her call Cardiologist related to worsening palpitation and advised her to take metoprolol everyday as prescribed.     Can use supplements for diabetes and continue to work on ADA diet recommendations. Patient is resistant to other medical therapies for blood pressure and blood sugar control and prefers \"homeopathic\" as often as possible. Will continue to work with her and her wants but I have discussed with her numerous times on the risk vs. Benefit. And it the supplements are not benefitting her anymore, will need to try other interventions.              "

## 2021-10-26 ENCOUNTER — OFFICE VISIT (OUTPATIENT)
Dept: CARDIOLOGY CLINIC | Age: 71
End: 2021-10-26
Payer: MEDICARE

## 2021-10-26 VITALS
SYSTOLIC BLOOD PRESSURE: 104 MMHG | WEIGHT: 178 LBS | DIASTOLIC BLOOD PRESSURE: 66 MMHG | HEART RATE: 57 BPM | BODY MASS INDEX: 34.95 KG/M2 | OXYGEN SATURATION: 98 % | HEIGHT: 60 IN

## 2021-10-26 DIAGNOSIS — I34.1 MVP (MITRAL VALVE PROLAPSE): ICD-10-CM

## 2021-10-26 DIAGNOSIS — R00.2 PALPITATIONS: ICD-10-CM

## 2021-10-26 DIAGNOSIS — I10 ESSENTIAL HYPERTENSION: ICD-10-CM

## 2021-10-26 DIAGNOSIS — I25.10 CORONARY ARTERY DISEASE INVOLVING NATIVE CORONARY ARTERY OF NATIVE HEART WITHOUT ANGINA PECTORIS: Primary | ICD-10-CM

## 2021-10-26 PROCEDURE — 99214 OFFICE O/P EST MOD 30 MIN: CPT | Performed by: NURSE PRACTITIONER

## 2021-10-26 RX ORDER — ACETAMINOPHEN 160 MG
TABLET,DISINTEGRATING ORAL DAILY
COMMUNITY

## 2021-10-26 RX ORDER — METFORMIN HYDROCHLORIDE 500 MG/1
TABLET, EXTENDED RELEASE ORAL
Qty: 90 TABLET | Refills: 3 | Status: SHIPPED | OUTPATIENT
Start: 2021-10-26 | End: 2022-07-28 | Stop reason: SDUPTHER

## 2021-10-26 RX ORDER — M-VIT,TX,IRON,MINS/CALC/FOLIC 27MG-0.4MG
1 TABLET ORAL DAILY
COMMUNITY

## 2021-10-26 RX ORDER — VITAMIN B COMPLEX
TABLET ORAL DAILY
COMMUNITY

## 2021-10-26 ASSESSMENT — ENCOUNTER SYMPTOMS
WHEEZING: 0
CHEST TIGHTNESS: 0
COUGH: 0
SORE THROAT: 0
SHORTNESS OF BREATH: 0

## 2021-10-26 NOTE — PROGRESS NOTES
[facility]   Established Patient Office Visit  [address]  [address] [address] [facility]  [phone]        OFFICE VISIT:  10/26/2021    Kathy Ennis - : 1950    Reason For Visit:  Jess Mora is a 73 y.o. female who is here for 6 Month Follow-Up (Patient complains of palpitations. This is not a new symptom. She wants some education on her diagnosis.)    1. Coronary artery disease involving native coronary artery of native heart without angina pectoris    2. Essential hypertension    3. MVP (mitral valve prolapse)         Patient with a history of mitral valve prolapse, diabetes, and hypertension.  Patient has a family history of coronary artery disease which includes a brother, father and mother. Esteban Gutiérrez is a non-smoker.      Patient states in 2020 she did have the Covid infection.     Patient presents to clinic today with no complaints of chest pain, pressure or tightness. There is no shortness of breath, orthopnea or PND. Patient denies any lightheadedness, dizziness or syncope. Patient has occasional episodes of palpitations.     Patient mainly wanting more education on palpitations and SVT. Did have a lengthy discussion with patient. She did verbalize understanding. DATA:  2019  Lexiscan apical ischemia, with a calculated ejection fraction of 68%.  With an 8% of ischemic burden.      2019 Cardiac catheterization:     1.  Successful femoral artery ultrasound  2.  Successful femoral artery arteriogram  3.  Supervision of the administration of moderate conscious sedation  4.  Mild coronary artery disease  5.  Left ventricular function is normal      2D ECHO 2019  Structurally normal.   Normal mitral valve leaflet mobility. Trace mitral regurgitation. Normal left ventricular size with preserved LV function and an estimated ejection fraction of approximately 55-60%. No regional wall motion abnormalities identified. Grade I diastolic dysfunction.  No evidence of left ventricular mass or thrombus noted.     ZIO Patch: sinus rhythm with a minimum heart rate of 47 bpm, maximum 148 bpm.  124 SVT runs fastest at a rate of 148 bpm.  Isolated supraventricular ectopy was noted 15.2%.     Yisel Be is a 73 y.o. female with the [facility]:    Patient Active Problem List    Diagnosis Date Noted    Abnormal nuclear cardiac imaging test     Chest pain 07/08/2019    Fibrocystic breast 07/26/2013    Panic attack 07/26/2013     Current Outpatient Medications   Medication Sig Dispense Refill    Cholecalciferol (VITAMIN D3) 50 MCG (2000 UT) CAPS Take by mouth daily      Coenzyme Q10 (COQ10) 100 MG CAPS Take by mouth daily      Multiple Vitamins-Minerals (THERAPEUTIC MULTIVITAMIN-MINERALS) tablet Take 1 tablet by mouth daily      metFORMIN (GLUCOPHAGE) 500 MG tablet Take 500 mg by mouth daily      amLODIPine (NORVASC) 5 MG tablet 2.5 mg 2 times daily       losartan (COZAAR) 100 MG tablet Take 100 mg by mouth daily.  metoprolol (LOPRESSOR) 50 MG tablet Take 25 mg by mouth 2 times daily       aspirin 81 MG EC tablet Take 81 mg by mouth daily (Patient not taking: Reported on 10/26/2021)       No current facility-administered medications for this visit. Allergies: Lactose intolerance (gi)  Past Medical History:   Diagnosis Date    Anxiety     Headache(784.0)     Hypertension     Type II or unspecified type diabetes mellitus without mention of complication, not stated as uncontrolled      Past Surgical History:   Procedure Laterality Date    ABDOMEN SURGERY      to remove growth off of fallopian tube.  APPENDECTOMY  1973    Pt also had a mass on tubes she had removed with this surgery   [address]    CHOLECYSTECTOMY  2009    [address] 80 AND CURETTAGE OF UTERUS  2007?    w/Ablation?    [phone]  2009    HYSTERECTOMY  2008    total     Family History   Problem Relation Age of Onset    High Blood Pressure Mother     High Blood Pressure Father     High Blood Pressure Sister     High Blood Pressure Brother     Breast Cancer Maternal Grandmother      Social History     Tobacco Use    Smoking status: Never Smoker    Smokeless tobacco: Never Used   Substance Use Topics    Alcohol use: No          Review of Systems:    Review of Systems   Constitutional: Negative for activity change, chills, diaphoresis, fatigue and fever. HENT: Negative for congestion and sore throat. Respiratory: Negative for cough, chest tightness, shortness of breath and wheezing. Cardiovascular: Negative for chest pain, palpitations and leg swelling. Neurological: Negative for dizziness, syncope, light-headedness and headaches. Psychiatric/Behavioral: Negative for confusion. The patient is not nervous/anxious. Objective:    /66   Pulse 57   Ht 5' (1.524 m)   Wt 178 lb (80.7 kg)   SpO2 98%   BMI 34.76 kg/m²     GENERAL - well developed and well nourished, conversant  HEENT -  PERRLA, Hearing appears normal, gentleman lids are normal.  External inspection of ears and nose appear normal.  NECK - no thyromegaly, no JVD, trachea is in the midline  CARDIOVASCULAR - PMI is in the mid line clavicular position, Normal S1 and S2 with no systolic murmur. No S3 or S4    PULMONARY - no respiratory distress. No wheezes or rales. Lungs are clear to ausculation, normal respiratory effort. ABDOMEN  - soft, non tender, no rebound  MUSCULOSKELETAL  - range of motion of the upper and lower extermites appears normal and equal and is without pain   EXTREMITIES - no significant edema   NEUROLOGIC - gait and station are normal  SKIN - turgor is normal, can warm and dry.   PSYCHIATRIC - normal mood and affect, alert and orientated x 3,      ASSESSMENT:    ALL THE CARDIOLOGY PROBLEMS ARE LISTED ABOVE; HOWEVER, THE FOLLOWING SPECIFIC CARDIAC PROBLEMS / CONDITIONS WERE ADDRESSED AND TREATED DURING THE OFFICE VISIT TODAY: MEDICAL DECISION MAKING             Cardiac Specific Problem / Diagnosis  Discussion and Data Reviewed Diagnostic Procedures Ordered Management Options Selected           1. CAD - mild   Stable   Review and summation of old records:    No chest pain. Patient is on aspirin, co-Q10, Lopressor. No Continue current medications:     Yes           2. Hypertension  Well Controlled   Pressure in the office today 104/66. O2 sat 98%. Patient is on losartan 100 mg daily. Norvasc 2.5 mg twice daily. Lopressor 25 mg twice daily. No Continue current medications:    Yes           3. Palpitations Stable  well controlled on Lopressor 25 mg twice daily. No Continue current medications: Yes                     No orders of the defined types were placed in this encounter. No orders of the defined types were placed in this encounter. Discussed with patient. Return in about 6 months (around 4/26/2022) for Routine with me . I greatly appreciate the opportunity to meet Akshat Jimenez and your confidence in allowing me to participate in her cardiovascular care. GAEL Tinoco NP  10/26/2021 10:53 AM CDT                    This dictation was generated by voice recognition computer software. Although all attempts are made to edit dictation for accuracy, there may be errors in the transcription that are not intended.

## 2022-04-05 RX ORDER — AMLODIPINE BESYLATE 5 MG/1
2.5 TABLET ORAL 2 TIMES DAILY
Qty: 30 TABLET | Refills: 11 | Status: SHIPPED | OUTPATIENT
Start: 2022-04-05 | End: 2023-02-28 | Stop reason: SDUPTHER

## 2022-04-13 ENCOUNTER — OFFICE VISIT (OUTPATIENT)
Dept: INTERNAL MEDICINE | Facility: CLINIC | Age: 72
End: 2022-04-13

## 2022-04-13 ENCOUNTER — HOSPITAL ENCOUNTER (OUTPATIENT)
Dept: GENERAL RADIOLOGY | Facility: HOSPITAL | Age: 72
Discharge: HOME | End: 2022-04-13
Admitting: FAMILY MEDICINE

## 2022-04-13 VITALS
HEART RATE: 72 BPM | SYSTOLIC BLOOD PRESSURE: 152 MMHG | OXYGEN SATURATION: 98 % | HEIGHT: 60 IN | TEMPERATURE: 97.1 F | WEIGHT: 181 LBS | BODY MASS INDEX: 35.53 KG/M2 | DIASTOLIC BLOOD PRESSURE: 90 MMHG

## 2022-04-13 DIAGNOSIS — Z78.0 POST-MENOPAUSAL: ICD-10-CM

## 2022-04-13 DIAGNOSIS — I10 ESSENTIAL HYPERTENSION: ICD-10-CM

## 2022-04-13 DIAGNOSIS — E11.9 DIABETES MELLITUS TREATED WITH ORAL MEDICATION: ICD-10-CM

## 2022-04-13 DIAGNOSIS — M25.561 CHRONIC PAIN OF RIGHT KNEE: ICD-10-CM

## 2022-04-13 DIAGNOSIS — G89.29 CHRONIC PAIN OF RIGHT KNEE: ICD-10-CM

## 2022-04-13 DIAGNOSIS — E03.9 ACQUIRED HYPOTHYROIDISM: ICD-10-CM

## 2022-04-13 DIAGNOSIS — E78.1 HIGH TRIGLYCERIDES: ICD-10-CM

## 2022-04-13 DIAGNOSIS — Z00.00 ENCOUNTER FOR SUBSEQUENT ANNUAL WELLNESS VISIT (AWV) IN MEDICARE PATIENT: Primary | ICD-10-CM

## 2022-04-13 DIAGNOSIS — Z79.84 DIABETES MELLITUS TREATED WITH ORAL MEDICATION: ICD-10-CM

## 2022-04-13 DIAGNOSIS — Z12.31 SCREENING MAMMOGRAM, ENCOUNTER FOR: ICD-10-CM

## 2022-04-13 PROCEDURE — 1170F FXNL STATUS ASSESSED: CPT | Performed by: FAMILY MEDICINE

## 2022-04-13 PROCEDURE — 73560 X-RAY EXAM OF KNEE 1 OR 2: CPT

## 2022-04-13 PROCEDURE — 1126F AMNT PAIN NOTED NONE PRSNT: CPT | Performed by: FAMILY MEDICINE

## 2022-04-13 PROCEDURE — G0439 PPPS, SUBSEQ VISIT: HCPCS | Performed by: FAMILY MEDICINE

## 2022-04-13 PROCEDURE — 99214 OFFICE O/P EST MOD 30 MIN: CPT | Performed by: FAMILY MEDICINE

## 2022-04-13 PROCEDURE — 1159F MED LIST DOCD IN RCRD: CPT | Performed by: FAMILY MEDICINE

## 2022-04-13 NOTE — PROGRESS NOTES
"The ABCs of the Annual Wellness Visit  Subsequent Medicare Wellness Visit    Chief Complaint   Patient presents with   • Medicare Wellness-subsequent   • Knee Pain     Right knee pain; ongoing issues      Subjective    History of Present Illness:  Pauline Molina is a 72 y.o. female who presents for a Subsequent Medicare Wellness Visit.    Cholesterol.  Pauline reports a history of high cholesterol and says she is not surprised by the significant increase in her triglycerides \"because of what she's been going through the last few months.\" The patient says her mother has been living with her since 01/2022, therefore, she has not been focusing much on herself.     Abnormal TSH.  The patient states she had cataract surgery in 08/2021 at which time her ophthalmologist recommended a thyroid panel. She is not clear why. She reports her current eye bulging is baseline.     Right knee symptoms.  Pauline reports new right knee symptoms including inflammation, stating it \"feels like it is giving way.\" She says she has not had any prior evaluation. She notes after prolonged sitting upon standing it becomes bothersome. She is agreeable to further evaluation with an x-ray followed by orthopedic referral if necessary. She reports having an elliptical machine at home and also has a massage gun, but has not used them recently.     General.  She notes an irregular heartbeat is not a new finding. She denies bowel complaints. She reports urinary retention for an extended period of time. No other urinary symptoms. She reports no history of bladder infections. Pauline says she had fluid in her ears on her last few clinic visits and started taking allergy medications at that time.     The following portions of the patient's history were reviewed and   updated as appropriate: allergies, current medications, past family history, past medical history, past social history, past surgical history and problem list.    Compared to one year ago, " the patient feels her physical   health is the same.    Compared to one year ago, the patient feels her mental   health is the same.    Recent Hospitalizations:  She was not admitted to the hospital during the last year.       Current Medical Providers:  Patient Care Team:  Elaine Randhawa DO as PCP - General (Family Medicine)    Outpatient Medications Prior to Visit   Medication Sig Dispense Refill   • amLODIPine (NORVASC) 5 MG tablet Take 0.5 tablets by mouth 2 (Two) Times a Day. 30 tablet 11   • Barberry-Oreg Grape-Goldenseal (BERBERINE COMPLEX PO) Take 1 capsule by mouth Daily.     • Coenzyme Q10 (CoQ-10) 100 MG capsule Take 1 capsule by mouth Daily.     • Fenugreek 500 MG capsule Take 1 capsule by mouth Daily.     • losartan (COZAAR) 100 MG tablet Take 1 tablet by mouth Daily. 90 tablet 3   • magnesium oxide (MAG-OX) 400 MG tablet Take 400 mg by mouth Daily.     • metFORMIN ER (GLUCOPHAGE-XR) 500 MG 24 hr tablet Take 1 tablet by mouth once daily with breakfast 90 tablet 3   • metoprolol tartrate (LOPRESSOR) 50 MG tablet Take 1/2 tablet in the AM and 1/2 tablet in the PM 90 tablet 3   • vitamin B-12 (CYANOCOBALAMIN) 1000 MCG tablet Take 1,000 mcg by mouth Daily. Spray     • vitamin D3 125 MCG (5000 UT) capsule capsule Take 1 capsule by mouth Daily.     • ASPIRIN 81 PO Take 1 capsule by mouth Daily.       No facility-administered medications prior to visit.       No opioid medication identified on active medication list. I have reviewed chart for other potential  high risk medication/s and harmful drug interactions in the elderly.          Aspirin is not on active medication list.  Aspirin use is not indicated based on review of current medical condition/s. Risk of harm outweighs potential benefits.  .    Patient Active Problem List   Diagnosis   • Abnormal nuclear cardiac imaging test   • Arnold-Chiari deformity (HCC)   • Allergic rhinitis, mild   • At low risk for fall   • Diabetes mellitus treated with  "oral medication (HCC)   • Essential hypertension   • Fatty liver disease, nonalcoholic   • Fibrocystic breast   • Headache   • High triglycerides   • History of colon polyps   • Mitral valve prolapse   • Multiple joint pain   • Palpitation   • Negative depression screening   • Panic attack   • Postmenopausal status   • History of adenomatous polyp of colon   • Family history of polyps in the colon   • Chest pain   • Degeneration of intervertebral disc   • Encounter for screening mammogram for malignant neoplasm of breast   • Muscle pain   • Pseudotumor cerebri   • Right leg pain   • Acquired hypothyroidism     Advance Care Planning  Advance Directive is not on file.  ACP discussion was held with the patient during this visit. Patient does not have an advance directive, information provided.          Objective    Vitals:    04/13/22 1208   BP: 152/90   BP Location: Left arm   Patient Position: Sitting   Cuff Size: Adult   Pulse: 72   Temp: 97.1 °F (36.2 °C)   TempSrc: Temporal   SpO2: 98%   Weight: 82.1 kg (181 lb)   Height: 152.4 cm (60\")   PainSc: 0-No pain     BMI Readings from Last 1 Encounters:   04/13/22 35.35 kg/m²   BMI is above normal parameters. Recommendations include: exercise counseling and nutrition counseling    Does the patient have evidence of cognitive impairment? No    Physical Exam  Vitals and nursing note reviewed.   Constitutional:       Appearance: Normal appearance. She is well-developed.   HENT:      Head: Normocephalic and atraumatic.      Right Ear: External ear normal.      Left Ear: External ear normal.      Nose: Nose normal.      Mouth/Throat:      Mouth: Mucous membranes are moist.   Eyes:      Extraocular Movements: Extraocular movements intact.      Conjunctiva/sclera: Conjunctivae normal.      Pupils: Pupils are equal, round, and reactive to light.   Neck:      Thyroid: No thyromegaly.      Vascular: No JVD.      Trachea: No tracheal deviation.   Cardiovascular:      Rate and " Rhythm: Normal rate and regular rhythm.      Pulses: Normal pulses.      Heart sounds: Normal heart sounds. No murmur heard.    No friction rub. No gallop.   Pulmonary:      Effort: Pulmonary effort is normal.      Breath sounds: Normal breath sounds.   Abdominal:      General: Bowel sounds are normal. There is no distension.      Palpations: Abdomen is soft.      Tenderness: There is no abdominal tenderness.   Musculoskeletal:         General: Normal range of motion.      Cervical back: Normal range of motion and neck supple.   Lymphadenopathy:      Cervical: No cervical adenopathy.   Skin:     General: Skin is warm and dry.      Capillary Refill: Capillary refill takes less than 2 seconds.   Neurological:      Mental Status: She is alert and oriented to person, place, and time.      Cranial Nerves: No cranial nerve deficit.      Coordination: Coordination normal.      Comments: Diabetic foot monofilament exam: 10 sites tested, 10 sites felt bilaterally.   Psychiatric:         Mood and Affect: Mood normal.         Behavior: Behavior normal.       Lab Results   Component Value Date    CHLPL 190 04/06/2022    TRIG 444 (H) 04/06/2022    HDL 26 (L) 04/06/2022    LDL 91 04/06/2022    VLDL 73 (H) 04/06/2022    HGBA1C 7.90 (H) 04/06/2022            HEALTH RISK ASSESSMENT    Smoking Status:  Social History     Tobacco Use   Smoking Status Never Smoker   Smokeless Tobacco Never Used     Alcohol Consumption:  Social History     Substance and Sexual Activity   Alcohol Use Never     Fall Risk Screen:    STEADI Fall Risk Assessment was completed, and patient is at LOW risk for falls.Assessment completed on:4/13/2022    Depression Screening:  PHQ-2/PHQ-9 Depression Screening 4/13/2022   Retired PHQ-9 Total Score -   Retired Total Score -   Little Interest or Pleasure in Doing Things 0-->not at all   Feeling Down, Depressed or Hopeless 0-->not at all   PHQ-9: Brief Depression Severity Measure Score 0       Health Habits and  Functional and Cognitive Screening:  Functional & Cognitive Status 4/13/2022   Do you have difficulty preparing food and eating? No   Do you have difficulty bathing yourself, getting dressed or grooming yourself? No   Do you have difficulty using the toilet? No   Do you have difficulty moving around from place to place? No   Do you have trouble with steps or getting out of a bed or a chair? Yes   Current Diet Well Balanced Diet   Dental Exam Up to date   Eye Exam Up to date   Exercise (times per week) 0 times per week   Current Exercises Include No Regular Exercise   Current Exercise Activities Include -   Do you need help using the phone?  No   Are you deaf or do you have serious difficulty hearing?  No   Do you need help with transportation? No   Do you need help shopping? No   Do you need help preparing meals?  No   Do you need help with housework?  No   Do you need help with laundry? No   Do you need help taking your medications? No   Do you need help managing money? No   Do you ever drive or ride in a car without wearing a seat belt? No   Have you felt unusual stress, anger or loneliness in the last month? No   Who do you live with? Spouse   If you need help, do you have trouble finding someone available to you? No   Have you been bothered in the last four weeks by sexual problems? No   Do you have difficulty concentrating, remembering or making decisions? Yes       Age-appropriate Screening Schedule:  Refer to the list below for future screening recommendations based on patient's age, sex and/or medical conditions. Orders for these recommended tests are listed in the plan section. The patient has been provided with a written plan.    Health Maintenance   Topic Date Due   • TDAP/TD VACCINES (1 - Tdap) Never done   • ZOSTER VACCINE (1 of 2) Never done   • DXA SCAN  09/03/2021   • DIABETIC FOOT EXAM  12/17/2021   • INFLUENZA VACCINE  08/01/2022   • DIABETIC EYE EXAM  08/31/2022   • HEMOGLOBIN A1C  10/06/2022   •  LIPID PANEL  04/06/2023   • URINE MICROALBUMIN  04/06/2023   • MAMMOGRAM  04/26/2023              Assessment/Plan   CMS Preventative Services Quick Reference  Risk Factors Identified During Encounter  Obesity/Overweight   The above risks/problems have been discussed with the patient.  Follow up actions/plans if indicated are seen below in the Assessment/Plan Section.  Pertinent information has been shared with the patient in the After Visit Summary.    Diagnoses and all orders for this visit:    1. Encounter for subsequent annual wellness visit (AWV) in Medicare patient (Primary)    2. Screening mammogram, encounter for  -     Mammo Screening Digital Tomosynthesis Bilateral With CAD; Future    3. Post-menopausal  -     DEXA Bone Density Axial; Future    4. Acquired hypothyroidism  -     Cancel: T4  -     Cancel: T3, free  -     T4  -     T3, free    5. Chronic pain of right knee  -     XR Knee Bilateral AP Standing; Future  -     XR Knee 1 or 2 View Right; Future    6. High triglycerides    7. Diabetes mellitus treated with oral medication (HCC)    8. Essential hypertension          Plan: X-rays of her right knee to include standing, lateral and sunrise views have been ordered. She will continue current medications. 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  Low-cholesterol diet.  Monitor blood sugar routinely.  At least daily.  Monitor blood pressure at daily she will follow up in 3 months for repeat of her blood glucose.     Follow Up:   Return in about 3 months (around 7/13/2022).     An After Visit Summary and PPPS were made available to the patient.                 Transcribed from ambient dictation for Elaine Randhawa DO by Rosie Mc.  04/13/22   16:39 CDT    Patient verbalized consent to the visit recording.

## 2022-04-14 LAB
T3FREE SERPL-MCNC: 2.5 PG/ML (ref 2–4.4)
T4 SERPL-MCNC: 8.4 UG/DL (ref 4.5–12)

## 2022-04-18 PROBLEM — E03.9 ACQUIRED HYPOTHYROIDISM: Status: ACTIVE | Noted: 2022-04-18

## 2022-04-29 ENCOUNTER — OFFICE VISIT (OUTPATIENT)
Dept: CARDIOLOGY CLINIC | Age: 72
End: 2022-04-29
Payer: MEDICARE

## 2022-04-29 VITALS
WEIGHT: 181 LBS | SYSTOLIC BLOOD PRESSURE: 128 MMHG | HEART RATE: 80 BPM | OXYGEN SATURATION: 95 % | BODY MASS INDEX: 35.53 KG/M2 | DIASTOLIC BLOOD PRESSURE: 82 MMHG | HEIGHT: 60 IN

## 2022-04-29 DIAGNOSIS — I25.10 CORONARY ARTERY DISEASE INVOLVING NATIVE CORONARY ARTERY OF NATIVE HEART WITHOUT ANGINA PECTORIS: ICD-10-CM

## 2022-04-29 DIAGNOSIS — R00.2 PALPITATIONS: ICD-10-CM

## 2022-04-29 DIAGNOSIS — I10 ESSENTIAL HYPERTENSION: Primary | ICD-10-CM

## 2022-04-29 PROCEDURE — 99214 OFFICE O/P EST MOD 30 MIN: CPT | Performed by: NURSE PRACTITIONER

## 2022-04-29 RX ORDER — METHYLDOPA/HYDROCHLOROTHIAZIDE 250MG-15MG
TABLET ORAL
COMMUNITY

## 2022-04-29 ASSESSMENT — ENCOUNTER SYMPTOMS
SHORTNESS OF BREATH: 0
CHEST TIGHTNESS: 0
COUGH: 0
SORE THROAT: 0
WHEEZING: 0

## 2022-04-29 NOTE — PROGRESS NOTES
[facility]   Established Patient Office Visit  [address]  [address] [address] [facility]  [phone]        OFFICE VISIT:  2022    Redgie Dakin - : 1950    Reason For Visit:  Vincent Pineda is a 81 y.o. female who is here for 6 Month Follow-Up    1. Essential hypertension    2. Coronary artery disease involving native coronary artery of native heart without angina pectoris    3. Palpitations        Patient with a history of mitral valve prolapse, diabetes, and hypertension.  Patient has a family history of coronary artery disease which includes a brother, father and mother. Kaye Levi is a non-smoker.      Patient also has a history of coronary artery disease having undergone cardiac catheterization in 2019 showing mild coronary artery disease, left ventricular function normal.      Patient states in 2020 she did have the Covid infection. Presents to clinic today for 6-month follow-up. Patient denies any complaints of chest pain, pressure or tightness. There is no shortness of breath, orthopnea or PND. Patient denies any lightheadedness, dizziness or syncope.     Subjective    Redgie Dakin is a 81 y.o. female with the following history as recorded in [facility]:    Patient Active Problem List    Diagnosis Date Noted    Abnormal nuclear cardiac imaging test     Chest pain 2019    Fibrocystic breast 2013    Panic attack 2013     Current Outpatient Medications   Medication Sig Dispense Refill    Menatetrenone (VITAMIN K2) 100 MCG TABS Take by mouth      Cholecalciferol (VITAMIN D3) 50 MCG (2000 UT) CAPS Take by mouth daily      Coenzyme Q10 (COQ10) 100 MG CAPS Take by mouth daily      Multiple Vitamins-Minerals (THERAPEUTIC MULTIVITAMIN-MINERALS) tablet Take 1 tablet by mouth daily      metFORMIN (GLUCOPHAGE) 500 MG tablet Take 500 mg by mouth daily      amLODIPine (NORVASC) 5 MG tablet 2.5 mg 2 times daily       losartan (COZAAR) 100 MG tablet Take 100 mg by mouth daily.  metoprolol (LOPRESSOR) 50 MG tablet Take 25 mg by mouth 2 times daily        No current facility-administered medications for this visit. Allergies: Lactose intolerance (gi)  Past Medical History:   Diagnosis Date    Anxiety     Headache(784.0)     Hypertension     Type II or unspecified type diabetes mellitus without mention of complication, not stated as uncontrolled      Past Surgical History:   Procedure Laterality Date    ABDOMEN SURGERY      to remove growth off of fallopian tube.  APPENDECTOMY  1973    Pt also had a mass on tubes she had removed with this surgery   [address]    CHOLECYSTECTOMY  2009    [address] AND CURETTAGE OF UTERUS  2007?    w/Ablation? Irma Craig  2009    HYSTERECTOMY  2008    total     Family History   Problem Relation Age of Onset    High Blood Pressure Mother     High Blood Pressure Father     High Blood Pressure Sister     High Blood Pressure Brother     Breast Cancer Maternal Grandmother      Social History     Tobacco Use    Smoking status: Never Smoker    Smokeless tobacco: Never Used   Substance Use Topics    Alcohol use: No          Review of Systems:    Review of Systems   Constitutional: Positive for fatigue. Negative for activity change, chills, diaphoresis and fever. HENT: Negative for congestion and sore throat. Respiratory: Negative for cough, chest tightness, shortness of breath and wheezing. Cardiovascular: Negative for chest pain, palpitations and leg swelling. Neurological: Negative for dizziness, syncope, light-headedness and headaches. Psychiatric/Behavioral: Negative for confusion. The patient is not nervous/anxious.          Objective:    /82   Pulse 80   Ht 5' (1.524 m)   Wt 181 lb (82.1 kg)   SpO2 95%   BMI 35.35 kg/m²     GENERAL - well developed and well nourished, conversant  HEENT -  PERRLA, Hearing appears normal, gentleman lids are normal.  External inspection of ears and nose appear normal.  NECK - no thyromegaly, no JVD, trachea is in the midline  CARDIOVASCULAR - PMI is in the mid line clavicular position, Normal S1 and S2 with no systolic murmur. No S3 or S4    PULMONARY - no respiratory distress. No wheezes or rales. Lungs are clear to ausculation, normal respiratory effort. ABDOMEN  - soft, non tender, no rebound  MUSCULOSKELETAL  - range of motion of the upper and lower extermites appears normal and equal and is without pain   EXTREMITIES - no significant edema   NEUROLOGIC - gait and station are normal  SKIN - turgor is normal, can warm and dry. PSYCHIATRIC - normal mood and affect, alert and orientated x 3,      ASSESSMENT:    ALL THE CARDIOLOGY PROBLEMS ARE LISTED ABOVE; HOWEVER, THE FOLLOWING SPECIFIC CARDIAC PROBLEMS / CONDITIONS WERE ADDRESSED AND TREATED DURING THE OFFICE VISIT TODAY:                                                                                            MEDICAL DECISION MAKING             Cardiac Specific Problem / Diagnosis  Discussion and Data Reviewed Diagnostic Procedures Ordered Management Options Selected           1. Hypertension  Well Controlled   Review and summation of old records:    Blood pressure 128/82. O2 sat 95%. Patient is on Norvasc 2.5 mg twice daily. Lopressor 25 mg twice daily. Cozaar 100 mg daily. No Continue current medications:     Yes           2. Palpitations  Stable   Stable on Lopressor 25 mg twice daily. She still has occasional episodes of palpitations but not nearly as much on the beta-blocker. No Continue current medications:    Yes           3. CAD/mild Stable  no chest pain. Patient is on co-Q10, and Lopressor. No Continue current medications: Yes                     No orders of the defined types were placed in this encounter. No orders of the defined types were placed in this encounter. Discussed with patient.     Return in about 6 months (around 10/29/2022) for Routine visit.    I greatly appreciate the opportunity to meet Mariel Barclayer and your confidence in allowing me to participate in her cardiovascular care. Marge Veras, GAEL Vasquez NP  4/29/2022 1:19 PM CDT                    This dictation was generated by voice recognition computer software. Although all attempts are made to edit dictation for accuracy, there may be errors in the transcription that are not intended.

## 2022-07-28 ENCOUNTER — OFFICE VISIT (OUTPATIENT)
Dept: INTERNAL MEDICINE | Facility: CLINIC | Age: 72
End: 2022-07-28

## 2022-07-28 VITALS
BODY MASS INDEX: 35.73 KG/M2 | OXYGEN SATURATION: 98 % | DIASTOLIC BLOOD PRESSURE: 82 MMHG | SYSTOLIC BLOOD PRESSURE: 140 MMHG | HEIGHT: 60 IN | HEART RATE: 67 BPM | WEIGHT: 182 LBS | TEMPERATURE: 97.3 F

## 2022-07-28 DIAGNOSIS — I10 ESSENTIAL HYPERTENSION: ICD-10-CM

## 2022-07-28 DIAGNOSIS — H91.93 BILATERAL HEARING LOSS, UNSPECIFIED HEARING LOSS TYPE: ICD-10-CM

## 2022-07-28 DIAGNOSIS — R51.9 CHRONIC NONINTRACTABLE HEADACHE, UNSPECIFIED HEADACHE TYPE: ICD-10-CM

## 2022-07-28 DIAGNOSIS — G89.29 CHRONIC NONINTRACTABLE HEADACHE, UNSPECIFIED HEADACHE TYPE: ICD-10-CM

## 2022-07-28 DIAGNOSIS — H61.21 IMPACTED CERUMEN OF RIGHT EAR: ICD-10-CM

## 2022-07-28 DIAGNOSIS — E11.9 TYPE 2 DIABETES MELLITUS WITHOUT COMPLICATION, WITHOUT LONG-TERM CURRENT USE OF INSULIN: Primary | ICD-10-CM

## 2022-07-28 DIAGNOSIS — E78.1 HIGH TRIGLYCERIDES: ICD-10-CM

## 2022-07-28 LAB — HBA1C MFR BLD: 8.4 %

## 2022-07-28 PROCEDURE — 83036 HEMOGLOBIN GLYCOSYLATED A1C: CPT | Performed by: FAMILY MEDICINE

## 2022-07-28 PROCEDURE — 3052F HG A1C>EQUAL 8.0%<EQUAL 9.0%: CPT | Performed by: FAMILY MEDICINE

## 2022-07-28 PROCEDURE — 99214 OFFICE O/P EST MOD 30 MIN: CPT | Performed by: FAMILY MEDICINE

## 2022-07-28 RX ORDER — METFORMIN HYDROCHLORIDE 500 MG/1
1000 TABLET, EXTENDED RELEASE ORAL
Qty: 180 TABLET | Refills: 3 | Status: SHIPPED | OUTPATIENT
Start: 2022-07-28 | End: 2023-02-22

## 2022-08-17 DIAGNOSIS — I10 ESSENTIAL HYPERTENSION: ICD-10-CM

## 2022-08-17 RX ORDER — METOPROLOL TARTRATE 50 MG/1
TABLET, FILM COATED ORAL
Qty: 90 TABLET | Refills: 1 | Status: SHIPPED | OUTPATIENT
Start: 2022-08-17 | End: 2022-11-28

## 2022-10-03 RX ORDER — LOSARTAN POTASSIUM 100 MG/1
100 TABLET ORAL DAILY
Qty: 90 TABLET | Refills: 3 | Status: SHIPPED | OUTPATIENT
Start: 2022-10-03

## 2022-10-31 ENCOUNTER — OFFICE VISIT (OUTPATIENT)
Dept: CARDIOLOGY CLINIC | Age: 72
End: 2022-10-31
Payer: MEDICARE

## 2022-10-31 VITALS
BODY MASS INDEX: 35.34 KG/M2 | HEART RATE: 80 BPM | WEIGHT: 180 LBS | OXYGEN SATURATION: 94 % | DIASTOLIC BLOOD PRESSURE: 76 MMHG | SYSTOLIC BLOOD PRESSURE: 118 MMHG | HEIGHT: 60 IN

## 2022-10-31 DIAGNOSIS — I34.1 MVP (MITRAL VALVE PROLAPSE): Primary | ICD-10-CM

## 2022-10-31 DIAGNOSIS — Z82.49 FAMILY HISTORY OF EARLY CAD: ICD-10-CM

## 2022-10-31 DIAGNOSIS — I10 ESSENTIAL HYPERTENSION: ICD-10-CM

## 2022-10-31 DIAGNOSIS — I25.10 CORONARY ARTERY DISEASE INVOLVING NATIVE CORONARY ARTERY OF NATIVE HEART WITHOUT ANGINA PECTORIS: ICD-10-CM

## 2022-10-31 PROCEDURE — 99214 OFFICE O/P EST MOD 30 MIN: CPT | Performed by: NURSE PRACTITIONER

## 2022-10-31 PROCEDURE — 3078F DIAST BP <80 MM HG: CPT | Performed by: NURSE PRACTITIONER

## 2022-10-31 PROCEDURE — 3074F SYST BP LT 130 MM HG: CPT | Performed by: NURSE PRACTITIONER

## 2022-10-31 PROCEDURE — 1123F ACP DISCUSS/DSCN MKR DOCD: CPT | Performed by: NURSE PRACTITIONER

## 2022-10-31 ASSESSMENT — ENCOUNTER SYMPTOMS
WHEEZING: 0
SHORTNESS OF BREATH: 0
CHEST TIGHTNESS: 0
COUGH: 0
SORE THROAT: 0

## 2022-10-31 NOTE — PROGRESS NOTES
[facility]   Established Patient Office Visit  [address]  [address] [address] [facility]  [phone]        OFFICE VISIT:  10/31/2022    Cecil Carroll - : 1950    Reason For Visit:  Kelvin Townsend is a 81 y.o. female who is here for 6 Month Follow-Up    1. MVP (mitral valve prolapse)    2. Essential hypertension    3. Family history of early CAD    3. Coronary artery disease involving native coronary artery of native heart without angina pectoris          Patient with a history of mitral valve prolapse, diabetes, and hypertension. Patient has a family history of coronary artery disease which includes a brother, father and mother. She is a non-smoker. Patient also has a history of coronary artery disease having undergone cardiac catheterization in 2019 showing mild coronary artery disease, left ventricular function normal.        Patient states in 2020 she did have the Covid infection. Presents to clinic today for 6-month follow-up. Patient denies any complaints of chest pain, pressure or tightness. There is no shortness of breath, orthopnea or PND. Patient denies any lightheadedness, dizziness or syncope.     Subjective    Cecil Carroll is a 81 y.o. female with the following history as recorded in [facility]:    Patient Active Problem List    Diagnosis Date Noted    Abnormal nuclear cardiac imaging test     Chest pain 2019    Fibrocystic breast 2013    Panic attack 2013     Current Outpatient Medications   Medication Sig Dispense Refill    Menatetrenone (VITAMIN K2) 100 MCG TABS Take by mouth      Cholecalciferol (VITAMIN D3) 50 MCG ( UT) CAPS Take by mouth daily      Coenzyme Q10 (COQ10) 100 MG CAPS Take by mouth daily      Multiple Vitamins-Minerals (THERAPEUTIC MULTIVITAMIN-MINERALS) tablet Take 1 tablet by mouth daily      metFORMIN (GLUCOPHAGE) 500 MG tablet Take 500 mg by mouth 2 times daily (with meals)      amLODIPine (NORVASC) 5 MG tablet 2.5 mg 2 times daily       losartan (COZAAR) 100 MG tablet Take 100 mg by mouth daily. metoprolol (LOPRESSOR) 50 MG tablet Take 25 mg by mouth 2 times daily        No current facility-administered medications for this visit. Allergies: Lactose intolerance (gi)  Past Medical History:   Diagnosis Date    Anxiety     Headache(784.0)     Hypertension     Type II or unspecified type diabetes mellitus without mention of complication, not stated as uncontrolled      Past Surgical History:   Procedure Laterality Date    ABDOMEN SURGERY      to remove growth off of fallopian tube. APPENDECTOMY  1973    Pt also had a mass on tubes she had removed with this surgery    [address]  2009    DILATION AND CURETTAGE OF UTERUS  2007?    w/Ablation? HERNIA REPAIR  2009    HYSTERECTOMY (CERVIX STATUS UNKNOWN)  2008    total     Family History   Problem Relation Age of Onset    High Blood Pressure Mother     High Blood Pressure Father     High Blood Pressure Sister     High Blood Pressure Brother     Breast Cancer Maternal Grandmother      Social History     Tobacco Use    Smoking status: Never    Smokeless tobacco: Never   Substance Use Topics    Alcohol use: No          Review of Systems:    Review of Systems   Constitutional:  Negative for activity change, chills, diaphoresis, fatigue and fever. HENT:  Negative for congestion and sore throat. Respiratory:  Negative for cough, chest tightness, shortness of breath and wheezing. Cardiovascular:  Negative for chest pain, palpitations and leg swelling. Neurological:  Negative for dizziness, syncope, light-headedness and headaches. Psychiatric/Behavioral:  Negative for confusion. The patient is not nervous/anxious.        Objective:    /76   Pulse 80   Ht 5' (1.524 m)   Wt 180 lb (81.6 kg)   SpO2 94%   BMI 35.15 kg/m²     GENERAL - well developed and well nourished, conversant  HEENT -  PERRLA, Hearing appears normal, gentleman lids are normal.  External inspection of ears and nose appear normal.  NECK - no thyromegaly, no JVD, trachea is in the midline  CARDIOVASCULAR - PMI is in the mid line clavicular position, Normal S1 and S2 with no  systolic murmur. No S3 or S4    PULMONARY - no respiratory distress. No wheezes or rales. Lungs are clear to ausculation, normal respiratory effort. ABDOMEN  - soft, non tender, no rebound  MUSCULOSKELETAL  - range of motion of the upper and lower extermites appears normal and equal and is without pain   EXTREMITIES - no significant edema   NEUROLOGIC - gait and station are normal  SKIN - turgor is normal, can warm and dry. PSYCHIATRIC - normal mood and affect, alert and orientated x 3,      ASSESSMENT:    ALL THE CARDIOLOGY PROBLEMS ARE LISTED ABOVE; HOWEVER, THE FOLLOWING SPECIFIC CARDIAC PROBLEMS / CONDITIONS WERE ADDRESSED AND TREATED DURING THE OFFICE VISIT TODAY:                                                                                            MEDICAL DECISION MAKING             Cardiac Specific Problem / Diagnosis  Discussion and Data Reviewed Diagnostic Procedures Ordered Management Options Selected           1. CAD - mild  Stable   Review and summation of old records:    No chest pain. Patient is on coenzyme Q 10, Lopressor. No Continue current medications:     Yes           2. Hypertension  Well Controlled   Pressure in the office today 118/76. O2 sat 94%. Patient is on losartan 100 mg daily. Lopressor 50mg twice daily. No Continue current medications:    Yes           3. MVP  Well Controlled Asymptomatic  No Continue current medications: Yes                     No orders of the defined types were placed in this encounter. No orders of the defined types were placed in this encounter. Discussed with patient. Return in about 6 months (around 4/30/2023) for Routine with me .     I greatly appreciate the opportunity to meet Lizz Vasquez and your confidence in allowing me to participate in her cardiovascular care. Noelle Conklin, GAEL - NP  10/31/2022 1:35 PM CDT                    This dictation was generated by voice recognition computer software. Although all attempts are made to edit dictation for accuracy, there may be errors in the transcription that are not intended.

## 2022-11-22 ENCOUNTER — OFFICE VISIT (OUTPATIENT)
Dept: INTERNAL MEDICINE | Facility: CLINIC | Age: 72
End: 2022-11-22

## 2022-11-22 VITALS
TEMPERATURE: 97.1 F | DIASTOLIC BLOOD PRESSURE: 74 MMHG | BODY MASS INDEX: 35.14 KG/M2 | HEIGHT: 60 IN | OXYGEN SATURATION: 96 % | HEART RATE: 77 BPM | WEIGHT: 179 LBS | SYSTOLIC BLOOD PRESSURE: 136 MMHG

## 2022-11-22 DIAGNOSIS — R79.89 ELEVATED LFTS: Primary | ICD-10-CM

## 2022-11-22 DIAGNOSIS — E11.65 TYPE 2 DIABETES MELLITUS WITH HYPERGLYCEMIA, WITHOUT LONG-TERM CURRENT USE OF INSULIN: ICD-10-CM

## 2022-11-22 DIAGNOSIS — I10 ESSENTIAL HYPERTENSION: ICD-10-CM

## 2022-11-22 DIAGNOSIS — R13.19 ESOPHAGEAL DYSPHAGIA: ICD-10-CM

## 2022-11-22 DIAGNOSIS — E66.09 CLASS 1 OBESITY DUE TO EXCESS CALORIES WITH SERIOUS COMORBIDITY AND BODY MASS INDEX (BMI) OF 34.0 TO 34.9 IN ADULT: ICD-10-CM

## 2022-11-22 PROCEDURE — 99214 OFFICE O/P EST MOD 30 MIN: CPT | Performed by: NURSE PRACTITIONER

## 2022-11-22 RX ORDER — SEMAGLUTIDE 1.34 MG/ML
0.25 INJECTION, SOLUTION SUBCUTANEOUS WEEKLY
Qty: 1.5 ML | Refills: 0 | Status: SHIPPED | OUTPATIENT
Start: 2022-11-22 | End: 2022-12-20

## 2022-11-22 NOTE — PROGRESS NOTES
Subjective     Chief Complaint:  RUQ pain     HPI:  Patient presents to the office today for a 4-month follow-up.  Her hemoglobin A1c has gone from 8.4-9.2.  She states that since her mother has been living with her, she has been eating more sweets as this is what her mother prefers to eat.  She tries to incorporate as many whole and fresh foods into her diet as she can.  She is not physically active.  She has been taking 2 metformin in the morning.  She has not been checking her blood glucose regularly.    Blood pressure is 136/74 in the office today.  She has not been checking this regularly at home either.     Patient notes an intermittent pain underneath her right rib cage.  She states this may be worse when she is hungry, but she really has not noted any particular pattern with this.  She does states she has some infrequent acid reflux, but does feel that this has worsened in the last year.  She states when this happens she does drink soda water which typically helps.  She does feel as though she has difficulty swallowing and sensation of food getting stuck intermittently.  She denies any nausea or vomiting.  She denies any stool changes, does have intermittent diarrhea which is unchanged.  She denies any fever or chills.    Patient's PMR from outside medical facility reviewed and noted.    Past Medical History:   Past Medical History:   Diagnosis Date   • Arrhythmia    • Family history of colonic polyps    • GERD (gastroesophageal reflux disease)    • History of adenomatous polyp of colon    • History of colon polyps    • Hyperlipidemia    • Hypertension    • MVP (mitral valve prolapse)    • Pseudotumor cerebri    • Type 2 diabetes mellitus (HCC)      Past Surgical History:  Past Surgical History:   Procedure Laterality Date   • CARDIAC CATHETERIZATION     • CATARACT EXTRACTION Right 08/15/2021   • CATARACT EXTRACTION Left 2021   •  SECTION     • CHOLECYSTECTOMY     • COLONOSCOPY   03/18/2015    One 3-4mm hyperplastic polyp in the rectum; Repeat 5 years   • COLONOSCOPY  09/17/2007    Normal; Repeat 3-4 years   • COLONOSCOPY  07/12/2004    One 2cm pedunculated erythematous adenomatous polyp in the sigmoid colon; One 6mm hyperplastic polyp in the rectum; Repeat 3 years   • COLONOSCOPY N/A 06/18/2021    Procedure: COLONOSCOPY WITH ANESTHESIA;  Surgeon: Kelly Kim MD;  Location: [facility] ENDOSCOPY;  Service: Gastroenterology;  Laterality: N/A;  pre op: hx polyps  post op:polyp  PCP: Jayla Null APRN   • DILATATION AND CURETTAGE     • ENDOSCOPY  09/30/2009    Normal endoscopy   • ENDOSCOPY  08/30/2004    GERD   • HERNIA REPAIR     • HYSTERECTOMY     • LAPAROSCOPIC LYSIS OF ADHESIONS     • OOPHORECTOMY     • OTHER SURGICAL HISTORY  06/14/2010    EUS-Dr. Hastings-Normal EUS evaluation of the pancreas, bile duct, and ampulla of Vater-pancreatitis remains idiopathic     Social History:  reports that she has never smoked. She has never used smokeless tobacco. She reports that she does not drink alcohol and does not use drugs.    Family History: family history includes Colon polyps in her father; Heart disease in her mother; Prostate cancer in her father; Stomach cancer (age of onset: 48) in her paternal grandmother.      Allergies:  Allergies   Allergen Reactions   • Tilactase GI Intolerance     Medications:  Prior to Admission medications    Medication Sig Start Date End Date Taking? Authorizing Provider   amLODIPine (NORVASC) 5 MG tablet Take 0.5 tablets by mouth 2 (Two) Times a Day. 4/5/22  Yes Jayla Null APRN   Barberry-Oreg Grape-Goldenseal (BERBERINE COMPLEX PO) Take 1 capsule by mouth Daily.   Yes Provider, MD Ann Marie   Coenzyme Q10 (CoQ-10) 100 MG capsule Take 1 capsule by mouth Daily.   Yes Provider, Historical, MD   Fenugreek 500 MG capsule Take 1 capsule by mouth Daily.   Yes Provider, MD Ann Marie   losartan (COZAAR) 100 MG tablet Take 1 tablet by  "mouth Daily. 10/3/22  Yes Elaine Randhawa DO   magnesium oxide (MAG-OX) 400 MG tablet Take 400 mg by mouth Daily.   Yes Ann Marie Padgett MD   metFORMIN ER (GLUCOPHAGE-XR) 500 MG 24 hr tablet Take 2 tablets by mouth Daily With Breakfast. 7/28/22  Yes Elaine Randhawa DO   metoprolol tartrate (LOPRESSOR) 50 MG tablet Take 1/2 tablet in the AM and 1/2 tablet in the PM 8/17/22  Yes Elaine Randhawa DO   vitamin B-12 (CYANOCOBALAMIN) 1000 MCG tablet Take 1,000 mcg by mouth Daily. [place]   Yes Ann Marie Padgett MD   vitamin D3 125 MCG (5000 UT) capsule capsule Take 1 capsule by mouth Daily.   Yes Ann Marie Padgett MD   Semaglutide,0.25 or 0.5MG/DOS, (Ozempic, 0.25 or 0.5 MG/DOSE,) 2 MG/1.5ML solution pen-injector Inject 0.25 mg under the skin into the appropriate area as directed 1 (One) Time Per Week. 11/22/22   Yuni Sanchez APRN       Objective     Vital Signs: /74 (BP Location: Left arm, Patient Position: Sitting, Cuff Size: Adult)   Pulse 77   Temp 97.1 °F (36.2 °C) (Temporal)   Ht 152.4 cm (60\")   Wt 81.2 kg (179 lb)   SpO2 96%   BMI 34.96 kg/m²   Physical Exam  Vitals and nursing note reviewed.   Constitutional:       General: She is not in acute distress.     Appearance: She is obese. She is not ill-appearing or toxic-appearing.   HENT:      Head: Normocephalic and atraumatic.      Mouth/Throat:      Mouth: Mucous membranes are moist.      Pharynx: Oropharynx is clear.   Cardiovascular:      Rate and Rhythm: Normal rate and regular rhythm.      Pulses: Normal pulses.      Heart sounds: Normal heart sounds.   Pulmonary:      Effort: Pulmonary effort is normal.      Breath sounds: No wheezing, rhonchi or rales.   Abdominal:      General: Bowel sounds are normal. There is no distension.      Palpations: Abdomen is soft.      Tenderness: There is no abdominal tenderness.   Musculoskeletal:         General: No swelling or tenderness. Normal range of motion.      Cervical back: " Normal range of motion and neck supple. No tenderness.   Skin:     General: Skin is warm and dry.      Findings: No erythema or rash.   Neurological:      General: No focal deficit present.      Mental Status: She is alert and oriented to person, place, and time.   Psychiatric:         Mood and Affect: Mood normal.         Behavior: Behavior normal.         Thought Content: Thought content normal.         Judgment: Judgment normal.       Results Reviewed:  Reviewed last office visit note from 7/28/2022.    Assessment / Plan     Assessment/Plan:  Diagnoses and all orders for this visit:    1. Elevated LFTs (Primary)  -     US Liver; Future    2. Esophageal dysphagia  -     Ambulatory Referral to Gastroenterology    3. Essential hypertension    4. Type 2 diabetes mellitus with hyperglycemia, without long-term current use of insulin (HCC)    5. Class 1 obesity due to excess calories with serious comorbidity and body mass index (BMI) of 34.0 to 34.9 in adult    Other orders  -     Semaglutide,0.25 or 0.5MG/DOS, (Ozempic, 0.25 or 0.5 MG/DOSE,) 2 MG/1.5ML solution pen-injector; Inject 0.25 mg under the skin into the appropriate area as directed 1 (One) Time Per Week.  Dispense: 1.5 mL; Refill: 0       Patient presents to the office today for a 4-month follow-up.  Her hemoglobin A1c has worsened from 8.4-9.2.  We will continue her on her current dosage of metformin, applied asked her to take 1 tablet in the morning and 1 tablet in the evening.  She does not like the way metformin makes her feel at times in regards to diarrhea so we will not increase her dosage at this time.  I would like to place her on trial of Ozempic as I feel she would benefit from this in terms of weight loss and overall feeling better.  She is agreeable to this and we will follow-up in 4 weeks to ensure tolerance and possibly increase dosage at that time.    Blood pressure is reasonably well controlled in the office at 136/74.  I would like for her  to more closely monitor this at home and she states she will try to do this.  We will continue present regimen for now.  Her renal function and electrolytes are stable.    Mild elevation of alkaline phosphatase and AST.  Patient does report pain underneath her right rib cage at times.  She does report that she was told she had fatty liver disease numerous years ago.  I do not have any previous imaging documenting this so I would like to order a liver ultrasound.      Her lipid panel is very poorly controlled compared to April.  Her total cholesterol has gone from 190-251, LDL from .  Triglycerides have actually improved but are still elevated at 419.  Patient does prefer nonprescription medications for this so we will trial fish oil supplement as she has taken this in the past with success as well.  I am hopeful that weight loss from Ozempic may help with her lipid panel as well.  She is agreeable that if her lipid panel has not significantly improved in 6 months, we may need to start a prescription medication.    Referral made to gastroenterology for evaluation of esophageal dysphagia and possible GERD.  The patient and I discussed taking over-the-counter acid reflux medication such as Prilosec or Nexium and patient is agreeable to this.    Return in about 4 weeks (around 12/20/2022) for Recheck- Ozempic. unless patient needs to be seen sooner or acute issues arise.    I have discussed the patient results/orders and and plan/recommendation with them at today's visit.      Yuni Sanchez, APRN   11/22/2022

## 2022-11-28 DIAGNOSIS — I10 ESSENTIAL HYPERTENSION: ICD-10-CM

## 2022-11-28 RX ORDER — METOPROLOL TARTRATE 50 MG/1
TABLET, FILM COATED ORAL
Qty: 90 TABLET | Refills: 3 | Status: SHIPPED | OUTPATIENT
Start: 2022-11-28

## 2022-12-02 ENCOUNTER — HOSPITAL ENCOUNTER (OUTPATIENT)
Dept: ULTRASOUND IMAGING | Facility: HOSPITAL | Age: 72
Discharge: HOME | End: 2022-12-02
Admitting: NURSE PRACTITIONER

## 2022-12-02 DIAGNOSIS — R79.89 ELEVATED LFTS: ICD-10-CM

## 2022-12-02 PROCEDURE — 76705 ECHO EXAM OF ABDOMEN: CPT

## 2022-12-05 NOTE — PROGRESS NOTES
Liver ultrasounds shows fatty liver. Patient to continue dietary efforts and Ozempic to help with weight loss. Will plan to recheck liver enzymes at next appt.

## 2022-12-20 ENCOUNTER — OFFICE VISIT (OUTPATIENT)
Dept: INTERNAL MEDICINE | Facility: CLINIC | Age: 72
End: 2022-12-20

## 2022-12-20 VITALS
TEMPERATURE: 96.9 F | OXYGEN SATURATION: 97 % | HEIGHT: 60 IN | SYSTOLIC BLOOD PRESSURE: 134 MMHG | DIASTOLIC BLOOD PRESSURE: 86 MMHG | BODY MASS INDEX: 33.96 KG/M2 | HEART RATE: 88 BPM | WEIGHT: 173 LBS

## 2022-12-20 DIAGNOSIS — E11.65 TYPE 2 DIABETES MELLITUS WITH HYPERGLYCEMIA, WITHOUT LONG-TERM CURRENT USE OF INSULIN: Primary | ICD-10-CM

## 2022-12-20 DIAGNOSIS — Z87.19 HISTORY OF PANCREATITIS: ICD-10-CM

## 2022-12-20 DIAGNOSIS — T88.7XXA SIDE EFFECT OF DRUG: ICD-10-CM

## 2022-12-20 DIAGNOSIS — E66.09 CLASS 1 OBESITY DUE TO EXCESS CALORIES WITH SERIOUS COMORBIDITY AND BODY MASS INDEX (BMI) OF 33.0 TO 33.9 IN ADULT: ICD-10-CM

## 2022-12-20 PROCEDURE — 99213 OFFICE O/P EST LOW 20 MIN: CPT | Performed by: NURSE PRACTITIONER

## 2022-12-20 RX ORDER — PEN NEEDLE, DIABETIC 30 GX3/16"
1 NEEDLE, DISPOSABLE MISCELLANEOUS WEEKLY
Qty: 12 EACH | Refills: 1 | Status: SHIPPED | OUTPATIENT
Start: 2022-12-20 | End: 2022-12-20

## 2022-12-20 RX ORDER — SEMAGLUTIDE 1.34 MG/ML
0.25 INJECTION, SOLUTION SUBCUTANEOUS WEEKLY
Qty: 1.5 ML | Refills: 0 | Status: SHIPPED | OUTPATIENT
Start: 2022-12-20 | End: 2023-01-19

## 2022-12-20 RX ORDER — PEN NEEDLE, DIABETIC 30 GX3/16"
1 NEEDLE, DISPOSABLE MISCELLANEOUS WEEKLY
Qty: 30 EACH | Refills: 0 | Status: SHIPPED | OUTPATIENT
Start: 2022-12-20

## 2022-12-20 NOTE — PROGRESS NOTES
Subjective     Chief Complaint:  Nausea    HPI:  The patient presents to the office today for a 4-week follow-up after starting Ozempic.  At her last office visit, her hemoglobin A1c was noted to have worsened from 8.4-9.2.  She continues on metformin 500 mg twice daily.  Her dosage of this was not increased as she has had diarrhea with increased dosage of this previously.  The patient reports that she seems to be doing well on the Ozempic.  She did notice some nausea and abdominal pain when for starting this medication.  This is still present but she does feel this has gotten better.  She states when she has nausea or abdominal pain it seems to wear off quickly.  During her last office visit she had complained of some right upper quadrant abdominal pain underneath her rib cage.  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  She has noted some mild constipation at times since starting Ozempic but does not feel this is bothersome.  She has lost 6 pounds since starting this medication.    Patient's PMR from outside medical facility reviewed and noted.    Past Medical History:   Past Medical History:   Diagnosis Date   • Arrhythmia    • Family history of colonic polyps    • GERD (gastroesophageal reflux disease)    • History of adenomatous polyp of colon    • History of colon polyps    • Hyperlipidemia    • Hypertension    • MVP (mitral valve prolapse)    • Pseudotumor cerebri    • Type 2 diabetes mellitus (HCC)      Past Surgical History:  Past Surgical History:   Procedure Laterality Date   • CARDIAC CATHETERIZATION     • CATARACT EXTRACTION Right 08/15/2021   • CATARACT EXTRACTION Left 2021   •  SECTION     • CHOLECYSTECTOMY     • COLONOSCOPY  2015    One 3-4mm hyperplastic polyp in the rectum; Repeat 5 years   • COLONOSCOPY  2007    Normal; Repeat 3-4 years   • COLONOSCOPY   07/12/2004    One 2cm pedunculated erythematous adenomatous polyp in the sigmoid colon; One 6mm hyperplastic polyp in the rectum; Repeat 3 years   • COLONOSCOPY N/A 06/18/2021    Procedure: COLONOSCOPY WITH ANESTHESIA;  Surgeon: Kelly Kim MD;  Location: [facility] ENDOSCOPY;  Service: Gastroenterology;  Laterality: N/A;  pre op: hx polyps  post op:polyp  PCP: Jayla Null APRN   • DILATATION AND CURETTAGE     • ENDOSCOPY  09/30/2009    Normal endoscopy   • ENDOSCOPY  08/30/2004    GERD   • HERNIA REPAIR     • HYSTERECTOMY     • LAPAROSCOPIC LYSIS OF ADHESIONS     • OOPHORECTOMY     • OTHER SURGICAL HISTORY  06/14/2010    EUS-Dr. Hastings-Normal EUS evaluation of the pancreas, bile duct, and ampulla of Vater-pancreatitis remains idiopathic     Social History:  reports that she has never smoked. She has never used smokeless tobacco. She reports that she does not drink alcohol and does not use drugs.    Family History: family history includes Colon polyps in her father; Heart disease in her mother; Prostate cancer in her father; Stomach cancer (age of onset: 48) in her paternal grandmother.      Allergies:  Allergies   Allergen Reactions   • Tilactase GI Intolerance     Medications:  Prior to Admission medications    Medication Sig Start Date End Date Taking? Authorizing Provider   amLODIPine (NORVASC) 5 MG tablet Take 0.5 tablets by mouth 2 (Two) Times a Day. 4/5/22  Yes Jayla Null APRN   Barberry-Oreg Grape-Goldenseal (BERBERINE COMPLEX PO) Take 1 capsule by mouth Daily.   Yes ProviderAnn Marie MD   Coenzyme Q10 (CoQ-10) 100 MG capsule Take 1 capsule by mouth Daily.   Yes Provider, MD Ann Marie   Fenugreek 500 MG capsule Take 1 capsule by mouth Daily.   Yes ProviderAnn Marie MD   Insulin Pen Needle (Pen Needles) 32G X 4 MM misc 1 each 1 (One) Time Per Week. 12/20/22  Yes Yuni Sanchez APRN   losartan (COZAAR) 100 MG tablet Take 1 tablet by mouth Daily. 10/3/22  Yes  "Elaine Randhawa DO   magnesium oxide (MAG-OX) 400 MG tablet Take 400 mg by mouth Daily.   Yes Ann Marie Padgett MD   metFORMIN ER (GLUCOPHAGE-XR) 500 MG 24 hr tablet Take 2 tablets by mouth Daily With Breakfast. 7/28/22  Yes Elaine Randhawa DO   metoprolol tartrate (LOPRESSOR) 50 MG tablet TAKE 1/2 (ONE-HALF) TABLET BY MOUTH IN THE MORNING AND 1/2 (ONE-HALF) IN THE EVENING 11/28/22  Yes Elaine Randhawa DO   Semaglutide,0.25 or 0.5MG/DOS, (Ozempic, 0.25 or 0.5 MG/DOSE,) 2 MG/1.5ML solution pen-injector Inject 0.25 mg under the skin into the appropriate area as directed 1 (One) Time Per Week. 12/20/22  Yes Yuni Sanchez APRN   vitamin B-12 (CYANOCOBALAMIN) 1000 MCG tablet Take 1,000 mcg by mouth Daily. [place]   Yes Ann Marie Padgett MD   vitamin D3 125 MCG (5000 UT) capsule capsule Take 1 capsule by mouth Daily.   Yes Ann Marie Padgett MD   Semaglutide,0.25 or 0.5MG/DOS, (Ozempic, 0.25 or 0.5 MG/DOSE,) 2 MG/1.5ML solution pen-injector Inject 0.25 mg under the skin into the appropriate area as directed 1 (One) Time Per Week. 11/22/22 12/20/22 Yes Yuni Sanchez APRN   Insulin Pen Needle (Pen Needles) 32G X 4 MM misc 1 each 1 (One) Time Per Week. 12/20/22 12/20/22  Yuni Sanchez APRN       Objective     Vital Signs: /86 (BP Location: Left arm, Patient Position: Sitting, Cuff Size: Adult)   Pulse 88   Temp 96.9 °F (36.1 °C) (Temporal)   Ht 152.4 cm (60\")   Wt 78.5 kg (173 lb)   SpO2 97%   BMI 33.79 kg/m²   Physical Exam  Vitals and nursing note reviewed.   Constitutional:       General: She is not in acute distress.     Appearance: She is obese. She is not ill-appearing or toxic-appearing.   HENT:      Head: Normocephalic and atraumatic.      Mouth/Throat:      Mouth: Mucous membranes are moist.      Pharynx: Oropharynx is clear.   Cardiovascular:      Rate and Rhythm: Normal rate and regular rhythm.      Pulses: Normal pulses.      Heart sounds: Normal heart sounds. "   Pulmonary:      Effort: Pulmonary effort is normal.      Breath sounds: No wheezing, rhonchi or rales.   Abdominal:      General: Bowel sounds are normal. There is no distension.      Palpations: Abdomen is soft.      Tenderness: There is no abdominal tenderness.      Comments: Protuberant abdomen   Musculoskeletal:         General: No swelling or tenderness. Normal range of motion.      Cervical back: Normal range of motion and neck supple. No tenderness.   Skin:     General: Skin is warm and dry.      Findings: No erythema or rash.   Neurological:      General: No focal deficit present.      Mental Status: She is alert and oriented to person, place, and time.   Psychiatric:         Mood and Affect: Mood normal.         Behavior: Behavior normal.         Thought Content: Thought content normal.         Judgment: Judgment normal.       BMI is >= 30 and <35. (Class 1 Obesity). The following options were offered after discussion;: exercise counseling/recommendations and nutrition counseling/recommendations    Results Reviewed:  Reviewed patient's last office visit note with me from 11/22/2022.  Reviewed liver ultrasound from 12/2/2022.  Reviewed upper GI endoscopy from [place] from 6/14/2010 for idiopathic pancreatitis.    Assessment / Plan     Assessment/Plan:  Diagnoses and all orders for this visit:    1. Type 2 diabetes mellitus with hyperglycemia, without long-term current use of insulin (HCC) (Primary)    2. Class 1 obesity due to excess calories with serious comorbidity and body mass index (BMI) of 33.0 to 33.9 in adult    3. History of pancreatitis    4. Side effect of drug    Other orders  -     Semaglutide,0.25 or 0.5MG/DOS, (Ozempic, 0.25 or 0.5 MG/DOSE,) 2 MG/1.5ML solution pen-injector; Inject 0.25 mg under the skin into the appropriate area as directed 1 (One) Time Per Week.  Dispense: 1.5 mL; Refill: 0  -     Discontinue: Insulin Pen Needle (Pen Needles) 32G X 4 MM misc; 1 each 1 (One) Time Per  Week.  Dispense: 12 each; Refill: 1  -     Insulin Pen Needle (Pen Needles) 32G X 4 MM misc; 1 each 1 (One) Time Per Week.  Dispense: 30 each; Refill: 0       Patient presents to the office today for a 4-week follow-up after starting Ozempic.  She has lost 6 pounds.  She has had some nausea and abdominal pain, which are known side effects of this medication.  She has had some mild constipation, but states this is not bothersome.  She does feel that this is slowly improving and she does not feel that her symptoms are similar to when she has had pancreatitis in the past.  I would like to continue her on the same dosage of Ozempic for now and follow-up in 1 month.  If her side effects are improving or stable, we may be able to increase her dosage at that time.  Will reassess A1c in February.    Return in about 4 weeks (around 1/17/2023) for Recheck- Ozempic. unless patient needs to be seen sooner or acute issues arise.    I have discussed the patient results/orders and and plan/recommendation with them at today's visit.      Yuni Sanchez, APRN   12/20/2022

## 2023-01-05 ENCOUNTER — OFFICE VISIT (OUTPATIENT)
Dept: GASTROENTEROLOGY | Facility: CLINIC | Age: 73
End: 2023-01-05
Payer: MEDICARE

## 2023-01-05 VITALS
BODY MASS INDEX: 33.77 KG/M2 | WEIGHT: 172 LBS | HEIGHT: 60 IN | OXYGEN SATURATION: 97 % | HEART RATE: 105 BPM | SYSTOLIC BLOOD PRESSURE: 126 MMHG | TEMPERATURE: 97.5 F | DIASTOLIC BLOOD PRESSURE: 80 MMHG

## 2023-01-05 DIAGNOSIS — R13.19 ESOPHAGEAL DYSPHAGIA: ICD-10-CM

## 2023-01-05 DIAGNOSIS — Z86.010 HISTORY OF ADENOMATOUS POLYP OF COLON: ICD-10-CM

## 2023-01-05 DIAGNOSIS — K76.0 FATTY LIVER DISEASE, NONALCOHOLIC: Primary | ICD-10-CM

## 2023-01-05 DIAGNOSIS — R11.0 NAUSEA: ICD-10-CM

## 2023-01-05 DIAGNOSIS — Z83.71 FAMILY HISTORY OF POLYPS IN THE COLON: ICD-10-CM

## 2023-01-05 DIAGNOSIS — K21.9 GASTROESOPHAGEAL REFLUX DISEASE, UNSPECIFIED WHETHER ESOPHAGITIS PRESENT: ICD-10-CM

## 2023-01-05 DIAGNOSIS — R79.89 ELEVATED LFTS: ICD-10-CM

## 2023-01-05 PROCEDURE — 99214 OFFICE O/P EST MOD 30 MIN: CPT | Performed by: NURSE PRACTITIONER

## 2023-01-05 RX ORDER — OMEPRAZOLE 40 MG/1
40 CAPSULE, DELAYED RELEASE ORAL DAILY
Qty: 30 CAPSULE | Refills: 11 | Status: SHIPPED | OUTPATIENT
Start: 2023-01-05

## 2023-01-05 NOTE — PROGRESS NOTES
Primary Physician: Yuni Sanchez APRN    Chief Complaint   Patient presents with   • Difficulty Swallowing     Pt c/o trouble swallowing \"for a long time\"-feels like food/medications/liquids get hung in her throat and \"won't go down\"-also states at times she gets strangled on her own spit; Pt's PCP advised OTC Omeprazole but can't really tell a difference but she hasn't been on it long-would like a prescriptions sent in to her pharmacy    • Abdominal Pain     Pt was having upper abdominal pain-has a hx of pancreatitis-had labs for PCP that showed elevated liver enzymes so she had liver US 12/2/2022; Pt states the pain has resolved now; Pt is on Ozempic but states this issue started before she was on the medication        Subjective     Pauline Molina is a 72 y.o. female.    HPI   Dysphagia  Pt having trouble swallowing nearly daily foods and pills hang in the middle of esophagus.  It will finally pass after waiting patiently or repeated drinking of liquids.  She has a feeling in her esophageal area that is burning.  Does complain of nausea.  Pt has not had any PPI in the past 3 years and she started back on OTC Omeprazole  Pt also does report taking Ozempic which did elevate her GI symptoms initially and she feels it has finally leveled off.    Personal Hx Adenomatous Colon Polyps  Patient is up-to-date on her colonoscopy.  Last colonoscopy was June 19, 2021 with 1 tubular adenomatous polyp at the transverse colon.  5-year recall given.      Fatty Liver  Ultrasound of the liver collected 12/2/2022 revealed increased echogenicity of the liver suggesting steatosis.  No focal hepatic mass seen.   Labs 11/15/2022: ALKP 149, AST 44, ALT 28 and Bili 0.5  Pt does NOT drink any alcohol.    Pt reports pancreatitis 12 years ago.  Had endoscopy with EUS by Dr Hastings June 2010: normal pancreas, bile duct, and ampulla of vater.    Past Medical History:   Diagnosis Date   • Arrhythmia    • Family history of colonic polyps     • GERD (gastroesophageal reflux disease)    • History of adenomatous polyp of colon    • History of colon polyps    • Hyperlipidemia    • Hypertension    • MVP (mitral valve prolapse)    • Pseudotumor cerebri    • Type 2 diabetes mellitus (HCC)        Past Surgical History:   Procedure Laterality Date   • CARDIAC CATHETERIZATION     • CATARACT EXTRACTION Right 08/15/2021   • CATARACT EXTRACTION Left 2021   •  SECTION     • CHOLECYSTECTOMY     • COLONOSCOPY  2015    One 3-4mm hyperplastic polyp in the rectum; Repeat 5 years   • COLONOSCOPY  2007    Normal; Repeat 3-4 years   • COLONOSCOPY  2004    One 2cm pedunculated erythematous adenomatous polyp in the sigmoid colon; One 6mm hyperplastic polyp in the rectum; Repeat 3 years   • COLONOSCOPY N/A 2021    One 7mm tubular adenomatous polyp in the transverse colon; The examination was otherwise normal on direct and retroflexion views; Repeat 5 years   • DILATATION AND CURETTAGE     • ENDOSCOPY  2009    Normal endoscopy   • ENDOSCOPY  2004    GERD   • HERNIA REPAIR     • HYSTERECTOMY     • LAPAROSCOPIC LYSIS OF ADHESIONS     • OOPHORECTOMY     • OTHER SURGICAL HISTORY  2010    EUS-Dr. Hastings-Normal EUS evaluation of the pancreas, bile duct, and ampulla of Vater-pancreatitis remains idiopathic        Current Outpatient Medications:   •  amLODIPine (NORVASC) 5 MG tablet, Take 0.5 tablets by mouth 2 (Two) Times a Day., Disp: 30 tablet, Rfl: 11  •  Barberry-Oreg Grape-Goldenseal (BERBERINE COMPLEX PO), Take 1 capsule by mouth Daily., Disp: , Rfl:   •  Coenzyme Q10 (CoQ-10) 100 MG capsule, Take 1 capsule by mouth Daily., Disp: , Rfl:   •  Fenugreek 500 MG capsule, Take 1 capsule by mouth Daily., Disp: , Rfl:   •  Insulin Pen Needle (Pen Needles) 32G X 4 MM misc, 1 each 1 (One) Time Per Week., Disp: 30 each, Rfl: 0  •  losartan (COZAAR) 100 MG tablet, Take 1 tablet by mouth Daily., Disp: 90 tablet, Rfl: 3  •  magnesium  oxide (MAG-OX) 400 MG tablet, Take 400 mg by mouth Daily., Disp: , Rfl:   •  metFORMIN ER (GLUCOPHAGE-XR) 500 MG 24 hr tablet, Take 2 tablets by mouth Daily With Breakfast. (Patient taking differently: Take 500 mg by mouth 2 (Two) Times a Day.), Disp: 180 tablet, Rfl: 3  •  metoprolol tartrate (LOPRESSOR) 50 MG tablet, TAKE 1/2 (ONE-HALF) TABLET BY MOUTH IN THE MORNING AND 1/2 (ONE-HALF) IN THE EVENING (Patient taking differently: Take 25 mg by mouth 2 (Two) Times a Day.), Disp: 90 tablet, Rfl: 3  •  Semaglutide,0.25 or 0.5MG/DOS, (Ozempic, 0.25 or 0.5 MG/DOSE,) 2 MG/1.5ML solution pen-injector, Inject 0.25 mg under the skin into the appropriate area as directed 1 (One) Time Per Week., Disp: 1.5 mL, Rfl: 0  •  vitamin B-12 (CYANOCOBALAMIN) 1000 MCG tablet, Take 1,000 mcg by mouth Daily. Spray, Disp: , Rfl:   •  vitamin D3 125 MCG (5000 UT) capsule capsule, Take 1 capsule by mouth Daily., Disp: , Rfl:   •  omeprazole (priLOSEC) 40 MG capsule, Take 1 capsule by mouth Daily., Disp: 30 capsule, Rfl: 11    Allergies   Allergen Reactions   • Tilactase GI Intolerance       Social History     Socioeconomic History   • Marital status:    Tobacco Use   • Smoking status: Never   • Smokeless tobacco: Never   Vaping Use   • Vaping Use: Never used   Substance and Sexual Activity   • Alcohol use: Never   • Drug use: Never   • Sexual activity: Not Currently       Family History   Problem Relation Age of Onset   • Prostate cancer Father    • Colon polyps Father         > 60 years of age   • Stomach cancer Paternal Grandmother 48   • Heart disease Mother    • Colon cancer Neg Hx    • Esophageal cancer Neg Hx    • Liver cancer Neg Hx    • Liver disease Neg Hx    • Rectal cancer Neg Hx        Review of Systems   Constitutional: Negative for unexpected weight change.   Respiratory: Negative for shortness of breath.    Cardiovascular: Negative for chest pain.       Objective     /80 (BP Location: Left arm, Patient Position:  Sitting, Cuff Size: Adult)   Pulse 105   Temp 97.5 °F (36.4 °C) (Infrared)   Ht 152.4 cm (60\")   Wt 78 kg (172 lb)   SpO2 97%   Breastfeeding No   BMI 33.59 kg/m²     Physical Exam  Vitals reviewed.   Constitutional:       Appearance: Normal appearance.   Cardiovascular:      Rate and Rhythm: Normal rate.      Heart sounds: Normal heart sounds.      Comments: skipped beat regularly  Pulmonary:      Effort: Pulmonary effort is normal.      Breath sounds: Normal breath sounds.   Neurological:      Mental Status: She is alert.         Lab Results - Last 18 Months   Lab Units 11/15/22  0853 04/06/22  0808   GLUCOSE mg/dL 217* 165*   BUN mg/dL 19 21   CREATININE mg/dL 0.72 0.81   SODIUM mmol/L 136 139   POTASSIUM mmol/L 4.2 4.5   CHLORIDE mmol/L 99 104   TOTAL CO2 mmol/L 25.0 27.0   ALBUMIN g/dL 4.60 4.30   ALT (SGPT) U/L 28 21   AST (SGOT) U/L 44* 31   ALK PHOS U/L 149* 115   BILIRUBIN mg/dL 0.5 0.4       Lab Results - Last 18 Months   Lab Units 04/06/22  0808   HEMOGLOBIN g/dL 14.4   HEMATOCRIT % 43.4   MCV fL 93.9   WBC 10*3/mm3 5.15   RDW % 13.1   PLATELETS 10*3/mm3 240       Lab Results - Last 18 Months   Lab Units 04/13/22  1206 04/06/22  0808   T4 TOTAL ug/dL 8.4  --    TSH uIU/mL  --  4.610*        US LIVER-12/2/2022 8:56 AM CST     REASON FOR EXAM: RUQ pain, elevated LFT's; R79.89-Other specified  abnormal findings of blood chemistry       COMPARISON: NONE      TECHNIQUE: Multiple longitudinal and transverse realtime sonographic  images of the right upper quadrant of the abdomen are obtained.      FINDINGS:    Pancreas: Obscured by overlying bowel gas..      Liver: There is increased echogenicity of liver parenchyma suggesting  steatosis of the liver. No evidence of focal hepatic mass. There is  hepatopedal flow within the portal vein..      Gallbladder: The gallbladder is surgically absent..      Bile ducts: The CBD measures 0.4 cm in diameter. There is no  intrahepatic or extrahepatic ductal dilation.       Right kidney measures 10.4 cm in [id number] length with a cortical  cyst involving the midpole measuring 9 x 14 x 12 mm..       Other: The visualized abdominal aorta is normal in caliber.      IMPRESSION:     1. Diffuse increased echogenicity of the liver parenchyma suggesting  steatosis of the liver. No evidence of focal hepatic mass. There is  hepatopedal flow within the portal vein.  2. The gallbladder is surgically absent. There is no biliary dilatation.  3. Cortical cyst mid pole right kidney.        This report was finalized on 12/02/2022 11:52 by Dr. Roni Ayers MD.      IMPRESSION/PLAN:    Assessment & Plan      Problem List Items Addressed This Visit        Family History    Family history of polyps in the colon    Overview     Father greater than 60 years of age            Gastrointestinal Abdominal     Fatty liver disease, nonalcoholic - Primary    Overview     Ultrasound of the liver collected 12/2/2022 revealed increased echogenicity of the liver suggesting steatosis.  No focal hepatic mass seen.   Labs 11/15/2022: ALKP 149, AST 44, ALT 28 and Bili 0.5  Pt does NOT drink any alcohol.         Current Assessment & Plan     Will check liver ultrasound with elastography in 6 months         Relevant Orders    US Liver    US Elastography Paranchyma    History of adenomatous polyp of colon    Overview     Last colonoscopy 6/19/2021: Adenomatous polyp of the transverse colon, 5-year recall         Esophageal dysphagia    Overview     Trouble swallowing foods and pills hanging in the middle of the esophagus nearly daily    Last endoscopy 2010 with EUS by Dr. Hastings         Relevant Medications    omeprazole (priLOSEC) 40 MG capsule    Other Relevant Orders    Case Request (Completed)    Elevated LFTs    Overview     Secondary to hepatic steatosis  Labs 11/15/2022: AST ALKP 149  Normal LFT's 4/6/2022         Relevant Orders    US Liver    US Elastography Paranchyma   Other Visit Diagnoses      Gastroesophageal reflux disease, unspecified whether esophagitis present        Relevant Medications    omeprazole (priLOSEC) 40 MG capsule    Other Relevant Orders    Case Request (Completed)    Nausea        Relevant Medications    omeprazole (priLOSEC) 40 MG capsule    Other Relevant Orders    Case Request (Completed)      Endoscopy examination per Dr. Kim  Starting Omeprazole 40mg daily    Recall colonoscopy June 2026    Recommend routine liver imaging to watch her hepatic steatosis.  We will get liver ultrasound with elastography in 6 months time.  Patient to have her lab work which she gets done at her PCP office for to our office each time.    ..The principles of management of steatohepatitis are weight loss through a structured diet and exercise as well as meticulous control of any component of metabolic syndrome, including blood glucose levels, cholesterol and blood pressure.   The patient should strive to lose 2-4 monthly until reaching 7-10% reduction in weight.      Coffee consumption has been shown to decrease the risk of HCC in patients with chronic liver disease.  In these patients, coffee consumption should be encouraged.    I have advised to avoid fructose containing food and drink.     Daily alcohol intake should strickly be below 30gm in men and 20 gm in women.    A physical activity program should in 150-200 min/week of moderate intensity in 3-5 sessions. Resistance training to promote musculoskeletal fitness and improve metabolic factors was reviewed.    ..The risks, benefits, and alternatives of endoscopy were reviewed with the patient today.  Risks including perforation, with or without dilation, possibly requiring surgery.  Additional risks include risk of bleeding.  There is also the risk of a drug reaction or problems with anesthesia.  This will be discussed with the further by the anesthesia team on the day of the procedure. The benefits include the diagnosis and management of disease of  the upper digestive tract.  Alternatives to endoscopy include upper GI series, laboratory testing, radiographic evaluation, or no intervention.  The patient verbalizes understanding and agrees.    In accordance with requirements under the Affordable Care Act, [facility] has provided pricing for all hospital services and items on each of its websites. However, a patient's actual cost may differ based on the services the patient receives to meet individual healthcare needs and based on the benefits provided under the patient’s insurance coverage.        Genna Martinez, APRN  01/05/23  15:17 CST    Part of this note may be an electronic transcription/translation of spoken language to printed text.

## 2023-01-19 ENCOUNTER — OFFICE VISIT (OUTPATIENT)
Dept: INTERNAL MEDICINE | Facility: CLINIC | Age: 73
End: 2023-01-19
Payer: MEDICARE

## 2023-01-19 VITALS
HEART RATE: 83 BPM | BODY MASS INDEX: 33.96 KG/M2 | TEMPERATURE: 97.1 F | WEIGHT: 173 LBS | SYSTOLIC BLOOD PRESSURE: 130 MMHG | DIASTOLIC BLOOD PRESSURE: 84 MMHG | HEIGHT: 60 IN | OXYGEN SATURATION: 96 %

## 2023-01-19 DIAGNOSIS — E66.09 CLASS 1 OBESITY DUE TO EXCESS CALORIES WITH SERIOUS COMORBIDITY AND BODY MASS INDEX (BMI) OF 33.0 TO 33.9 IN ADULT: Chronic | ICD-10-CM

## 2023-01-19 DIAGNOSIS — E11.65 TYPE 2 DIABETES MELLITUS WITH HYPERGLYCEMIA, WITHOUT LONG-TERM CURRENT USE OF INSULIN: Primary | Chronic | ICD-10-CM

## 2023-01-19 DIAGNOSIS — K21.9 GASTROESOPHAGEAL REFLUX DISEASE, UNSPECIFIED WHETHER ESOPHAGITIS PRESENT: ICD-10-CM

## 2023-01-19 PROBLEM — E66.811 CLASS 1 OBESITY DUE TO EXCESS CALORIES WITH SERIOUS COMORBIDITY AND BODY MASS INDEX (BMI) OF 33.0 TO 33.9 IN ADULT: Status: ACTIVE | Noted: 2023-01-19

## 2023-01-19 PROCEDURE — 99214 OFFICE O/P EST MOD 30 MIN: CPT | Performed by: NURSE PRACTITIONER

## 2023-01-19 RX ORDER — SEMAGLUTIDE 1.34 MG/ML
0.5 INJECTION, SOLUTION SUBCUTANEOUS WEEKLY
Qty: 1.5 ML | Refills: 1 | Status: SHIPPED | OUTPATIENT
Start: 2023-01-19 | End: 2023-01-19

## 2023-01-19 RX ORDER — SEMAGLUTIDE 1.34 MG/ML
0.5 INJECTION, SOLUTION SUBCUTANEOUS WEEKLY
Qty: 1.5 ML | Refills: 1 | Status: SHIPPED | OUTPATIENT
Start: 2023-01-19 | End: 2023-02-21 | Stop reason: SDUPTHER

## 2023-01-19 NOTE — PROGRESS NOTES
"        Subjective     Chief Complaint:  Constipation    HPI:  The patient presents to the office today for a 4-week follow-up.  She has been taking the low-dose Ozempic for about 8 weeks now.  She reports doing well on this medication, although it does not seem to be doing as well curbing her appetite.  According to her home scale, she has continued to slowly lose weight since being started on the medication.  She states she has been exercising lightly and is going to increase her exercise.  She has been watching her calories.  She did feel she had some nausea and abdominal pain initially upon starting this medication, but feels that her symptoms have now leveled off.  She does feel as though she gets a \"trapped gas\" sensation at times with some generalized abdominal discomfort but no longer complains of upper abdominal pain.  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    The patient has been having some constipation since starting Ozempic.  She does use Senokot as needed and has also tried to increase her fiber intake.  She does eat flaxseeds and Sánchez seeds to help with constipation as well.  She denies any nausea or vomiting at this time.  She does still have some feelings of esophageal dysphagia, with sensation of pills getting stuck at times.  She does not feel that she has taken the omeprazole long enough to notice a difference with this medication.  She did have some concerns with taking 40 mg rather than 20 mg.  She was prescribed 40 mg by gastroenterology.  She is scheduled for EGD next month.    The patient does check her blood glucose on a regular basis.  She states her fasting blood glucose a couple of days ago was 118 and that is the lowest it has been in years.  Before starting on Ozempic, her fasting blood sugar was typically around 200 or more.    Patient's PMR from outside medical facility reviewed and noted.    Past " Medical History:   Past Medical History:   Diagnosis Date   • Arrhythmia    • Family history of colonic polyps    • GERD (gastroesophageal reflux disease)    • History of adenomatous polyp of colon    • History of colon polyps    • Hyperlipidemia    • Hypertension    • MVP (mitral valve prolapse)    • Pseudotumor cerebri    • Type 2 diabetes mellitus (HCC)      Past Surgical History:  Past Surgical History:   Procedure Laterality Date   • CARDIAC CATHETERIZATION     • CATARACT EXTRACTION Right 08/15/2021   • CATARACT EXTRACTION Left 2021   •  SECTION     • CHOLECYSTECTOMY     • COLONOSCOPY  2015    One 3-4mm hyperplastic polyp in the rectum; Repeat 5 years   • COLONOSCOPY  2007    Normal; Repeat 3-4 years   • COLONOSCOPY  2004    One 2cm pedunculated erythematous adenomatous polyp in the sigmoid colon; One 6mm hyperplastic polyp in the rectum; Repeat 3 years   • COLONOSCOPY N/A 2021    One 7mm tubular adenomatous polyp in the transverse colon; The examination was otherwise normal on direct and retroflexion views; Repeat 5 years   • DILATATION AND CURETTAGE     • ENDOSCOPY  2009    Normal endoscopy   • ENDOSCOPY  2004    GERD   • HERNIA REPAIR     • HYSTERECTOMY     • LAPAROSCOPIC LYSIS OF ADHESIONS     • OOPHORECTOMY     • OTHER SURGICAL HISTORY  2010    EUS-Dr. Hastings-Normal EUS evaluation of the pancreas, bile duct, and ampulla of Vater-pancreatitis remains idiopathic     Social History:  reports that she has never smoked. She has never used smokeless tobacco. She reports that she does not drink alcohol and does not use drugs.    Family History: family history includes Colon polyps in her father; Heart disease in her mother; Prostate cancer in her father; Stomach cancer (age of onset: 48) in her paternal grandmother.      Allergies:  Allergies   Allergen Reactions   • Tilactase GI Intolerance     Medications:  Prior to Admission medications    Medication Sig  Start Date End Date Taking? Authorizing Provider   amLODIPine (NORVASC) 5 MG tablet Take 0.5 tablets by mouth 2 (Two) Times a Day. 4/5/22  Yes Jayla Null APRN   Barberry-Oreg Grape-Goldenseal (BERBERINE COMPLEX PO) Take 1 capsule by mouth Daily.   Yes Ann Marie Padgett MD   Coenzyme Q10 (CoQ-10) 100 MG capsule Take 1 capsule by mouth Daily.   Yes Ann Marie Padgett MD   Fenugreek 500 MG capsule Take 1 capsule by mouth Daily.   Yes ProviderAnn Marie MD   Insulin Pen Needle (Pen Needles) 32G X 4 MM misc 1 each 1 (One) Time Per Week. 12/20/22  Yes Yuni Sanchez APRN   losartan (COZAAR) 100 MG tablet Take 1 tablet by mouth Daily. 10/3/22  Yes Elaine Randhawa DO   magnesium oxide (MAG-OX) 400 MG tablet Take 400 mg by mouth Daily.   Yes Ann Marie Padgett MD   metFORMIN ER (GLUCOPHAGE-XR) 500 MG 24 hr tablet Take 2 tablets by mouth Daily With Breakfast.  Patient taking differently: Take 500 mg by mouth 2 (Two) Times a Day. 7/28/22  Yes Elaine Randhawa DO   metoprolol tartrate (LOPRESSOR) 50 MG tablet TAKE 1/2 (ONE-HALF) TABLET BY MOUTH IN THE MORNING AND 1/2 (ONE-HALF) IN THE EVENING  Patient taking differently: Take 25 mg by mouth 2 (Two) Times a Day. 11/28/22  Yes Elaine Randhawa DO   Semaglutide,0.25 or 0.5MG/DOS, (Ozempic, 0.25 or 0.5 MG/DOSE,) 2 MG/1.5ML solution pen-injector Inject 0.5 mg under the skin into the appropriate area as directed 1 (One) Time Per Week. 1/19/23  Yes Yuni Sanchez APRN   vitamin B-12 (CYANOCOBALAMIN) 1000 MCG tablet Take 1,000 mcg by mouth Daily. [place]   Yes ProviderAnn Marie MD   vitamin D3 125 MCG (5000 UT) capsule capsule Take 1 capsule by mouth Daily.   Yes Ann Marie Padgett MD   Semaglutide,0.25 or 0.5MG/DOS, (Ozempic, 0.25 or 0.5 MG/DOSE,) 2 MG/1.5ML solution pen-injector Inject 0.25 mg under the skin into the appropriate area as directed 1 (One) Time Per Week. 12/20/22 1/19/23 Yes Yuni Sanchez, APRN  "  Semaglutide,0.25 or 0.5MG/DOS, (Ozempic, 0.25 or 0.5 MG/DOSE,) 2 MG/1.5ML solution pen-injector Inject 0.5 mg under the skin into the appropriate area as directed 1 (One) Time Per Week. 1/19/23 1/19/23 Yes Yuni Sanchez APRN   omeprazole (priLOSEC) 40 MG capsule Take 1 capsule by mouth Daily. 1/5/23   Genna Martinez APRN       Objective     Vital Signs: /84 (BP Location: Left arm, Patient Position: Sitting, Cuff Size: Adult)   Pulse 83   Temp 97.1 °F (36.2 °C) (Temporal)   Ht 152.4 cm (60\")   Wt 78.5 kg (173 lb)   SpO2 96%   BMI 33.79 kg/m²   Physical Exam  Vitals and nursing note reviewed.   Constitutional:       General: She is not in acute distress.     Appearance: She is obese. She is not ill-appearing or toxic-appearing.   HENT:      Head: Normocephalic and atraumatic.      Mouth/Throat:      Mouth: Mucous membranes are moist.      Pharynx: Oropharynx is clear.   Cardiovascular:      Rate and Rhythm: Normal rate and regular rhythm.      Pulses: Normal pulses.      Heart sounds: Normal heart sounds.   Pulmonary:      Effort: Pulmonary effort is normal.      Breath sounds: No wheezing, rhonchi or rales.   Abdominal:      General: Bowel sounds are normal. There is no distension.      Palpations: Abdomen is soft.      Tenderness: There is no abdominal tenderness.      Comments: Protuberant abdomen   Musculoskeletal:         General: No swelling or tenderness. Normal range of motion.      Cervical back: Normal range of motion and neck supple. No tenderness.   Skin:     General: Skin is warm and dry.      Findings: No erythema or rash.   Neurological:      General: No focal deficit present.      Mental Status: She is alert and oriented to person, place, and time.   Psychiatric:         Mood and Affect: Mood normal.         Behavior: Behavior normal.         Thought Content: Thought content normal.         Judgment: Judgment normal.       BMI is >= 30 and <35. (Class 1 Obesity). The following " options were offered after discussion;: exercise counseling/recommendations and nutrition counseling/recommendations    Results Reviewed:  Reviewed patient's last office visit notes with me from 12/20/2022 and 11/22/2022.  Reviewed GI office visit note from 1/5/2023.    Assessment / Plan     Assessment/Plan:  Diagnoses and all orders for this visit:    1. Type 2 diabetes mellitus with hyperglycemia, without long-term current use of insulin (HCC) (Primary)    2. Gastroesophageal reflux disease, unspecified whether esophagitis present  Overview:  Added automatically from request for surgery [id number]      3. Class 1 obesity due to excess calories with serious comorbidity and body mass index (BMI) of 33.0 to 33.9 in adult    Other orders  -     Discontinue: Semaglutide,0.25 or 0.5MG/DOS, (Ozempic, 0.25 or 0.5 MG/DOSE,) 2 MG/1.5ML solution pen-injector; Inject 0.5 mg under the skin into the appropriate area as directed 1 (One) Time Per Week.  Dispense: 1.5 mL; Refill: 1  -     Semaglutide,0.25 or 0.5MG/DOS, (Ozempic, 0.25 or 0.5 MG/DOSE,) 2 MG/1.5ML solution pen-injector; Inject 0.5 mg under the skin into the appropriate area as directed 1 (One) Time Per Week.  Dispense: 1.5 mL; Refill: 1     The patient presents to the office today for a 4-week follow-up.  She has been on Ozempic for about 8 weeks now.  She does feel that this is not helping curb her appetite as much is when she first started.  She is having a little bit of constipation, which is felt to be side effect from the Ozempic.  She is using Senokot as needed and has increased fiber intake.  Would also advise increasing water intake.  According to her home scale, she has progressively been losing weight since starting Ozempic.  We will increase her dose from 0.25 mg to 0.5 mg and have asked her to reach out if she notices any side effects.  Patient will continue to monitor her blood glucoses at home.  We will recheck her A1c next month.    Advised the patient to  take the omeprazole 40 mg dosing until her EGD next month, at which point she will be further advised by gastroenterology based on EGD findings.    Return in about 4 weeks (around 2/16/2023) for Recheck- A1c, weight. unless patient needs to be seen sooner or acute issues arise.    I have discussed the patient results/orders and and plan/recommendation with them at today's visit.      Yuni Sanchez, APRN   01/19/2023

## 2023-02-13 ENCOUNTER — ANESTHESIA (OUTPATIENT)
Dept: GASTROENTEROLOGY | Facility: HOSPITAL | Age: 73
End: 2023-02-13
Payer: MEDICARE

## 2023-02-13 ENCOUNTER — ANESTHESIA EVENT (OUTPATIENT)
Dept: GASTROENTEROLOGY | Facility: HOSPITAL | Age: 73
End: 2023-02-13
Payer: MEDICARE

## 2023-02-13 ENCOUNTER — HOSPITAL ENCOUNTER (OUTPATIENT)
Facility: HOSPITAL | Age: 73
Setting detail: HOSPITAL OUTPATIENT SURGERY
Discharge: HOME | End: 2023-02-13
Attending: INTERNAL MEDICINE | Admitting: INTERNAL MEDICINE
Payer: MEDICARE

## 2023-02-13 VITALS
RESPIRATION RATE: 23 BRPM | DIASTOLIC BLOOD PRESSURE: 56 MMHG | SYSTOLIC BLOOD PRESSURE: 101 MMHG | WEIGHT: 169 LBS | BODY MASS INDEX: 33.18 KG/M2 | HEART RATE: 75 BPM | HEIGHT: 60 IN | OXYGEN SATURATION: 94 % | TEMPERATURE: 97.3 F

## 2023-02-13 DIAGNOSIS — K21.9 GASTROESOPHAGEAL REFLUX DISEASE, UNSPECIFIED WHETHER ESOPHAGITIS PRESENT: ICD-10-CM

## 2023-02-13 DIAGNOSIS — R13.19 ESOPHAGEAL DYSPHAGIA: ICD-10-CM

## 2023-02-13 DIAGNOSIS — R11.0 NAUSEA: ICD-10-CM

## 2023-02-13 LAB — GLUCOSE BLDC GLUCOMTR-MCNC: 112 MG/DL (ref 70–130)

## 2023-02-13 PROCEDURE — 25010000002 PROPOFOL 10 MG/ML EMULSION: Performed by: NURSE ANESTHETIST, CERTIFIED REGISTERED

## 2023-02-13 PROCEDURE — 82962 GLUCOSE BLOOD TEST: CPT

## 2023-02-13 PROCEDURE — 43249 ESOPH EGD DILATION <30 MM: CPT | Performed by: INTERNAL MEDICINE

## 2023-02-13 PROCEDURE — C1726 CATH, BAL DIL, NON-VASCULAR: HCPCS | Performed by: INTERNAL MEDICINE

## 2023-02-13 RX ORDER — LIDOCAINE HYDROCHLORIDE 10 MG/ML
0.5 INJECTION, SOLUTION EPIDURAL; INFILTRATION; INTRACAUDAL; PERINEURAL ONCE AS NEEDED
Status: CANCELLED | OUTPATIENT
Start: 2023-02-13

## 2023-02-13 RX ORDER — SODIUM CHLORIDE 0.9 % (FLUSH) 0.9 %
10 SYRINGE (ML) INJECTION AS NEEDED
Status: DISCONTINUED | OUTPATIENT
Start: 2023-02-13 | End: 2023-02-13 | Stop reason: HOSPADM

## 2023-02-13 RX ORDER — PROPOFOL 10 MG/ML
VIAL (ML) INTRAVENOUS AS NEEDED
Status: DISCONTINUED | OUTPATIENT
Start: 2023-02-13 | End: 2023-02-13 | Stop reason: SURG

## 2023-02-13 RX ORDER — LIDOCAINE HYDROCHLORIDE 20 MG/ML
INJECTION, SOLUTION EPIDURAL; INFILTRATION; INTRACAUDAL; PERINEURAL AS NEEDED
Status: DISCONTINUED | OUTPATIENT
Start: 2023-02-13 | End: 2023-02-13 | Stop reason: SURG

## 2023-02-13 RX ORDER — SODIUM CHLORIDE 9 MG/ML
INJECTION, SOLUTION INTRAVENOUS CONTINUOUS PRN
Status: DISCONTINUED | OUTPATIENT
Start: 2023-02-13 | End: 2023-02-13 | Stop reason: SURG

## 2023-02-13 RX ORDER — SODIUM CHLORIDE 9 MG/ML
500 INJECTION, SOLUTION INTRAVENOUS CONTINUOUS PRN
Status: DISCONTINUED | OUTPATIENT
Start: 2023-02-13 | End: 2023-02-13 | Stop reason: HOSPADM

## 2023-02-13 RX ADMIN — SODIUM CHLORIDE 500 ML: 9 INJECTION, SOLUTION INTRAVENOUS at 07:17

## 2023-02-13 RX ADMIN — SODIUM CHLORIDE: 9 INJECTION, SOLUTION INTRAVENOUS at 07:39

## 2023-02-13 RX ADMIN — PROPOFOL INJECTABLE EMULSION 50 MG: 10 INJECTION, EMULSION INTRAVENOUS at 07:43

## 2023-02-13 RX ADMIN — PROPOFOL INJECTABLE EMULSION 50 MG: 10 INJECTION, EMULSION INTRAVENOUS at 07:42

## 2023-02-13 RX ADMIN — PROPOFOL INJECTABLE EMULSION 50 MG: 10 INJECTION, EMULSION INTRAVENOUS at 07:41

## 2023-02-13 RX ADMIN — PROPOFOL INJECTABLE EMULSION 50 MG: 10 INJECTION, EMULSION INTRAVENOUS at 07:48

## 2023-02-13 RX ADMIN — PROPOFOL INJECTABLE EMULSION 50 MG: 10 INJECTION, EMULSION INTRAVENOUS at 07:40

## 2023-02-13 RX ADMIN — LIDOCAINE HYDROCHLORIDE 100 MG: 20 INJECTION, SOLUTION EPIDURAL; INFILTRATION; INTRACAUDAL; PERINEURAL at 07:40

## 2023-02-13 RX ADMIN — PROPOFOL INJECTABLE EMULSION 50 MG: 10 INJECTION, EMULSION INTRAVENOUS at 07:45

## 2023-02-13 NOTE — H&P
Chief Complaint:   Dysphagia    Subjective     HPI:   She is having complaints of dysphagia.  Last endoscopy was over 10 years ago.    Past Medical History:   Past Medical History:   Diagnosis Date   • Arrhythmia    • Family history of colonic polyps    • GERD (gastroesophageal reflux disease)    • History of adenomatous polyp of colon    • History of colon polyps    • Hyperlipidemia    • Hypertension    • MVP (mitral valve prolapse)    • Pseudotumor cerebri    • Type 2 diabetes mellitus (HCC)        Past Surgical History:  Past Surgical History:   Procedure Laterality Date   • CARDIAC CATHETERIZATION     • CATARACT EXTRACTION Right 08/15/2021   • CATARACT EXTRACTION Left 2021   •  SECTION     • CHOLECYSTECTOMY     • COLONOSCOPY  2015    One 3-4mm hyperplastic polyp in the rectum; Repeat 5 years   • COLONOSCOPY  2007    Normal; Repeat 3-4 years   • COLONOSCOPY  2004    One 2cm pedunculated erythematous adenomatous polyp in the sigmoid colon; One 6mm hyperplastic polyp in the rectum; Repeat 3 years   • COLONOSCOPY N/A 2021    One 7mm tubular adenomatous polyp in the transverse colon; The examination was otherwise normal on direct and retroflexion views; Repeat 5 years   • DILATATION AND CURETTAGE     • ENDOSCOPY  2009    Normal endoscopy   • ENDOSCOPY  2004    GERD   • HERNIA REPAIR     • HYSTERECTOMY     • LAPAROSCOPIC LYSIS OF ADHESIONS     • OOPHORECTOMY     • OTHER SURGICAL HISTORY  2010    EUS-Dr. Hastings-Normal EUS evaluation of the pancreas, bile duct, and ampulla of Vater-pancreatitis remains idiopathic       Family History:  Family History   Problem Relation Age of Onset   • Prostate cancer Father    • Colon polyps Father         > 60 years of age   • Stomach cancer Paternal Grandmother 48   • Heart disease Mother    • Colon cancer Neg Hx    • Esophageal cancer Neg Hx    • Liver cancer Neg Hx    • Liver disease Neg Hx    • Rectal cancer Neg Hx         Social History:   reports that she has never smoked. She has never used smokeless tobacco. She reports that she does not drink alcohol and does not use drugs.    Medications:   Medications Prior to Admission   Medication Sig Dispense Refill Last Dose   • amLODIPine (NORVASC) 5 MG tablet Take 0.5 tablets by mouth 2 (Two) Times a Day. 30 tablet 11 2/13/2023   • losartan (COZAAR) 100 MG tablet Take 1 tablet by mouth Daily. 90 tablet 3 2/13/2023   • magnesium oxide (MAG-OX) 400 MG tablet Take 400 mg by mouth Daily.   2/12/2023   • metFORMIN ER (GLUCOPHAGE-XR) 500 MG 24 hr tablet Take 2 tablets by mouth Daily With Breakfast. (Patient taking differently: Take 500 mg by mouth 2 (Two) Times a Day.) 180 tablet 3 2/12/2023   • metoprolol tartrate (LOPRESSOR) 50 MG tablet TAKE 1/2 (ONE-HALF) TABLET BY MOUTH IN THE MORNING AND 1/2 (ONE-HALF) IN THE EVENING (Patient taking differently: Take 25 mg by mouth 2 (Two) Times a Day.) 90 tablet 3 2/13/2023   • omeprazole (priLOSEC) 40 MG capsule Take 1 capsule by mouth Daily. 30 capsule 11 2/12/2023   • Barberry-Oreg Grape-Goldenseal (BERBERINE COMPLEX PO) Take 1 capsule by mouth Daily.   2/11/2023   • Coenzyme Q10 (CoQ-10) 100 MG capsule Take 1 capsule by mouth Daily.   2/10/2023   • Fenugreek 500 MG capsule Take 1 capsule by mouth Daily.   2/11/2023   • Insulin Pen Needle (Pen Needles) 32G X 4 MM misc 1 each 1 (One) Time Per Week. 30 each 0 Unknown   • Semaglutide,0.25 or 0.5MG/DOS, (Ozempic, 0.25 or 0.5 MG/DOSE,) 2 MG/1.5ML solution pen-injector Inject 0.5 mg under the skin into the appropriate area as directed 1 (One) Time Per Week. 1.5 mL 1 2/7/2023   • vitamin B-12 (CYANOCOBALAMIN) 1000 MCG tablet Take 1,000 mcg by mouth Daily. Spray   2/10/2023   • vitamin D3 125 MCG (5000 UT) capsule capsule Take 1 capsule by mouth Daily.   2/10/2023       Allergies:  Tilactase    ROS:    Resp: No SOA  Cardiovascular: No CP      Objective     /95 (Patient Position: Sitting)   Pulse  "82   Temp 97.3 °F (36.3 °C) (Temporal)   Resp 18   Ht 151.1 cm (59.5\")   Wt 76.7 kg (169 lb)   SpO2 98%   BMI 33.56 kg/m²     Physical Exam   Constitutional: Pt is oriented to person, place, and in no distress.   Pulmonary/Chest: No distress.  No audible wheezes  Psychiatric: Mood, memory, affect and judgment appear normal.     Assessment & Plan     Diagnosis:  Dysphagia    Anticipated Surgical Procedure:  Endoscopy    The risks, benefits, and alternatives of endoscopy were reviewed with the patient today.  Risks including perforation, with or without dilation, possibly requiring surgery.  Additional risks include risk of bleeding.  There is also the risk of a drug reaction or problems with anesthesia.  This will be discussed with the further by the anesthesia team on the day of the procedure. The benefits include the diagnosis and management of disease of the upper digestive tract.  Alternatives to endoscopy include upper GI series, laboratory testing, radiographic evaluation, or no intervention.  The patient verbalizes understanding and agrees.    Much of this encounter note is an electronic transcription/translation of spoken language to printed text. The electronic translation of spoken language may permit erroneous, or at times, nonsensical words or phrases to be inadvertently transcribed; although I have reviewed the note for such errors, some may still exist.  "

## 2023-02-13 NOTE — ANESTHESIA PREPROCEDURE EVALUATION
Anesthesia Evaluation     Patient summary reviewed and Nursing notes reviewed   no history of anesthetic complications:  NPO Solid Status: > 8 hours             Airway   Mallampati: I  TM distance: >3 FB  Neck ROM: full  No difficulty expected  Dental      Pulmonary    (-) asthma, sleep apnea, not a smoker  Cardiovascular   Exercise tolerance: good (4-7 METS)    (+) hypertension, dysrhythmias PAC, PVC, hyperlipidemia,   (-) past MI, cardiac stents    ROS comment: Positive stress test 2019  Subsequent cath with no CAD    Neuro/Psych  (+) TIA,    (-) seizures, CVA    ROS Comment: Pseudotumor cerebri  Arnold chiari deformity  Ventricle cyst  GI/Hepatic/Renal/Endo    (+) obesity,  GERD,  liver disease fatty liver disease, diabetes mellitus type 2, thyroid problem   (-) no renal disease    Musculoskeletal     Abdominal    Substance History      OB/GYN          Other                          Anesthesia Plan    ASA 3     MAC     intravenous induction     Anesthetic plan, risks, benefits, and alternatives have been provided, discussed and informed consent has been obtained with: patient.      
Never smoker

## 2023-02-13 NOTE — ANESTHESIA POSTPROCEDURE EVALUATION
Patient: Pauline Molina    Procedure Summary     Date: 02/13/23 Room / Location: [facility] ENDOSCOPY 2 / [facility] ENDOSCOPY    Anesthesia Start: 0738 Anesthesia Stop: 0754    Procedure: ESOPHAGOGASTRODUODENOSCOPY WITH ANESTHESIA Diagnosis:       Esophageal dysphagia      Gastroesophageal reflux disease, unspecified whether esophagitis present      Nausea      (Esophageal dysphagia [R13.19])      (Gastroesophageal reflux disease, unspecified whether esophagitis present [K21.9])      (Nausea [R11.0])    Surgeons: Kelly Kim MD Provider: Roxann Trujillo CRNA    Anesthesia Type: MAC ASA Status: 3          Anesthesia Type: MAC    Vitals  Vitals Value Taken Time   BP 99/60 02/13/23 0756   Temp     Pulse 76 02/13/23 0800   Resp 19 02/13/23 0755   SpO2 92 % 02/13/23 0800   Vitals shown include unvalidated device data.        Post Anesthesia Care and Evaluation    Patient location during evaluation: PHASE II  Patient participation: complete - patient participated  Level of consciousness: awake and alert  Pain score: 0  Pain management: adequate    Airway patency: patent  Anesthetic complications: No anesthetic complications  PONV Status: none  Cardiovascular status: acceptable  Respiratory status: acceptable  Hydration status: acceptable  No anesthesia care post op

## 2023-02-21 ENCOUNTER — OFFICE VISIT (OUTPATIENT)
Dept: INTERNAL MEDICINE | Facility: CLINIC | Age: 73
End: 2023-02-21
Payer: MEDICARE

## 2023-02-21 ENCOUNTER — HOSPITAL ENCOUNTER (OUTPATIENT)
Dept: GENERAL RADIOLOGY | Facility: HOSPITAL | Age: 73
Discharge: HOME | End: 2023-02-21
Admitting: NURSE PRACTITIONER
Payer: MEDICARE

## 2023-02-21 VITALS
HEART RATE: 87 BPM | OXYGEN SATURATION: 97 % | WEIGHT: 168 LBS | DIASTOLIC BLOOD PRESSURE: 72 MMHG | BODY MASS INDEX: 32.98 KG/M2 | SYSTOLIC BLOOD PRESSURE: 124 MMHG | TEMPERATURE: 97.1 F | HEIGHT: 60 IN

## 2023-02-21 DIAGNOSIS — E66.09 CLASS 1 OBESITY DUE TO EXCESS CALORIES WITH SERIOUS COMORBIDITY AND BODY MASS INDEX (BMI) OF 33.0 TO 33.9 IN ADULT: ICD-10-CM

## 2023-02-21 DIAGNOSIS — M54.6 ACUTE MIDLINE THORACIC BACK PAIN: ICD-10-CM

## 2023-02-21 DIAGNOSIS — D51.0 PERNICIOUS ANEMIA: ICD-10-CM

## 2023-02-21 DIAGNOSIS — E55.9 VITAMIN D INSUFFICIENCY: ICD-10-CM

## 2023-02-21 DIAGNOSIS — I10 ESSENTIAL HYPERTENSION: ICD-10-CM

## 2023-02-21 DIAGNOSIS — E11.65 TYPE 2 DIABETES MELLITUS WITH HYPERGLYCEMIA, WITHOUT LONG-TERM CURRENT USE OF INSULIN: Primary | ICD-10-CM

## 2023-02-21 PROCEDURE — 3044F HG A1C LEVEL LT 7.0%: CPT | Performed by: NURSE PRACTITIONER

## 2023-02-21 PROCEDURE — 99214 OFFICE O/P EST MOD 30 MIN: CPT | Performed by: NURSE PRACTITIONER

## 2023-02-21 PROCEDURE — 83036 HEMOGLOBIN GLYCOSYLATED A1C: CPT | Performed by: NURSE PRACTITIONER

## 2023-02-21 PROCEDURE — 72072 X-RAY EXAM THORAC SPINE 3VWS: CPT

## 2023-02-21 RX ORDER — SEMAGLUTIDE 1.34 MG/ML
0.5 INJECTION, SOLUTION SUBCUTANEOUS WEEKLY
Qty: 1.5 ML | Refills: 2 | Status: SHIPPED | OUTPATIENT
Start: 2023-02-21 | End: 2023-03-17

## 2023-02-22 LAB
25(OH)D3+25(OH)D2 SERPL-MCNC: 55.4 NG/ML (ref 30–100)
HBA1C MFR BLD: 6.6 %
VIT B12 SERPL-MCNC: 1551 PG/ML (ref 211–946)

## 2023-02-22 RX ORDER — METFORMIN HYDROCHLORIDE 500 MG/1
500 TABLET, EXTENDED RELEASE ORAL
Qty: 180 TABLET | Refills: 3
Start: 2023-02-22

## 2023-02-22 NOTE — PROGRESS NOTES
SOB HPI





- General


Chief Complaint: Shortness of Breath


Stated Complaint: Cough


Time Seen by Provider: 10/15/21 01:22


Source: patient, family


Mode of arrival: ambulatory





- History of Present Illness


Initial Comments: 


1 month 15-day-old female patient is brought to the emergency department by 

mother for evaluation after she was "breathing differently" than usual earlier 

today.  Mother states she had nasal congestion and was may be breathing faster. 

Reports rare coughing. States she sounded congested.  Denies any fevers or 

chills.  States she feels that she is doing better at this time.  States she was

born at 36 weeks 6 days with no complications.  No history of respiratory 

problems.  She is eating and drinking without difficulty.  Reports normal amount

of wet diapers and bowel movements. Parent denies any weight loss, changes in 

activity level, seizure activity, shortness of breath, color changes with 

feeding, wheezing, vomiting, diarrhea, constipation, hematemesis, hematochezia, 

melena, hematuria, swelling, rash, or abnormal bruising.








- Related Data


                                    Allergies











Allergy/AdvReac Type Severity Reaction Status Date / Time


 


No Known Allergies Allergy   Verified 10/15/21 00:26














Review of Systems


ROS Statement: 


Those systems with pertinent positive or pertinent negative responses have been 

documented in the HPI.





ROS Other: All systems not noted in ROS Statement are negative.





Past Medical History


Past Medical History: No Reported History


History of Any Multi-Drug Resistant Organisms: None Reported


Past Surgical History: No Surgical Hx Reported


Past Psychological History: No Psychological Hx Reported


Smoking Status: Never smoker


Past Alcohol Use History: None Reported


Past Drug Use History: None Reported





General Exam


General appearance: alert, in no apparent distress, other (This is a well-

developed, well-nourished, nontoxic-appearing infant in no acute distress.  

Vital signs upon presentation are temperature 98.4F, pulse 131, respirations 

32, pulse ox 96% on room air.)


Eye exam: Present: normal appearance, PERRL, EOMI.  Absent: scleral icterus, 

conjunctival injection, nystagmus, periorbital swelling


ENT exam: Present: normal exam, normal oropharynx, mucous membranes moist, TM's 

normal bilaterally


Respiratory exam: Present: normal lung sounds bilaterally, other (No 

retractions, no tachypnea).  Absent: respiratory distress, wheezes, rales, 

rhonchi, stridor


Cardiovascular Exam: Present: regular rate, normal rhythm, normal heart sounds. 

Absent: systolic murmur, diastolic murmur, rubs, gallop, clicks


GI/Abdominal exam: Present: soft, normal bowel sounds.  Absent: distended, 

tenderness, guarding, rebound, rigid


Neurological exam: Present: alert, oriented X3, CN II-XII intact


Psychiatric exam: Present: normal affect, normal mood


Skin exam: Present: warm, dry, intact, normal color.  Absent: rash





Course


                                   Vital Signs











  10/15/21 10/15/21 10/15/21





  00:23 01:59 03:24


 


Temperature 98.4 F 98.2 F 


 


Pulse Rate 131  128


 


Respiratory 32  24





Rate   


 


O2 Sat by Pulse 96  





Oximetry   














Medical Decision Making





- Medical Decision Making





1 month 15-day-old female patient is brought to the emergency department today 

for evaluation of nasal congestion and occasional cough.  Physical examination 

did reveal clear equal lung sounds.  No retractions, no tachypnea.  She is 

afebrile.  She did test positive for RSV.  Chest x-ray is negative.  I did 

discuss signs or symptoms of worsening respiratory status with the mother.  She 

is to maintain low threshold for return.  She is instructed to follow-up with 

the pediatrician for recheck today.  She verbalizes understanding and agrees 

with this plan.  Case discussed with my attending Dr. Bernal.





- Lab Data


                                   Lab Results











  10/15/21 Range/Units





  02:15 


 


Influenza Type A (PCR)  Not Detected  (Not Detectd)  


 


Influenza Type B (PCR)  Not Detected  (Not Detectd)  


 


RSV (PCR)  Detected A  (Not Detectd)  


 


SARS-CoV-2 (PCR)  Not Detected  (Not Detectd)  














- Radiology Data


Radiology results: report reviewed, image reviewed


Two-view x-ray of the chest is obtained.  Report is reviewed in its entirety.  

Impression by Dr. King shows no pulmonary consolidation or heart failure.  

Normal heart.





Disposition


Clinical Impression: 


 RSV (acute bronchiolitis due to respiratory syncytial virus)





Disposition: HOME SELF-CARE


Condition: Good


Instructions (If sedation given, give patient instructions):  Respiratory 

Syncytial Virus (ED)


Additional Instructions: 


Follow-up the pediatrician tomorrow for further evaluation.  Return for any new,

worsening, or concerning symptoms.


Is patient prescribed a controlled substance at d/c from ED?: No


Referrals: 


Bernabe Cabrales MD [Primary Care Provider] - 1-2 days


Time of Disposition: 03:17 Vitamin B12 level is actually on the high side above normal range so patient will not need a B12 shot for replacement. Vitamin D level is within normal range and improved compared to when this was checked 2 years ago.

## 2023-02-22 NOTE — PROGRESS NOTES
Subjective     Chief Complaint:  Back pain    HPI:  The patient presents to the office today for a 1 month follow-up.  She has been taking Ozempic for around 3 months now.  She reports continuing to do well on this medication.  At her last office visit, the dosage was increased and she did have some difficulty with side effects after initially starting the newer dosage.  She now reports that symptoms have improved.  She was having nausea and increased abdominal pain, but again this has seemed to improve now.     The patient has been checking her blood glucoses throughout the day.  She states her morning blood glucoses are running between 115 and 130.  Her blood glucoses 2 hours postprandial are in the low 100s.    Blood pressure is well controlled in the office today 124/72.  Patient denies any chest pain, shortness of breath, dizziness/lightheadedness or frequent headaches.    Patient states she has had pain in the middle of her back between her shoulder blades for a few weeks.  The pain seems to be constant and is worse with certain movements such as twisting or bending.  The patient has had no known injuries, denies heavy lifting or strenuous activity.  She states the pain is not worse with cough or deep inspiration.  She has not really tried any oral medications for pain but has been using a turmeric topical solution which has seemed to help.    Patient's PMR from outside medical facility reviewed and noted.    Past Medical History:   Past Medical History:   Diagnosis Date   • Arrhythmia    • Family history of colonic polyps    • GERD (gastroesophageal reflux disease)    • History of adenomatous polyp of colon    • History of colon polyps    • Hyperlipidemia    • Hypertension    • MVP (mitral valve prolapse)    • Pseudotumor cerebri    • Type 2 diabetes mellitus (HCC)      Past Surgical History:  Past Surgical History:   Procedure Laterality Date   • CARDIAC CATHETERIZATION     • CATARACT EXTRACTION  Right 08/15/2021   • CATARACT EXTRACTION Left 2021   •  SECTION     • CHOLECYSTECTOMY     • COLONOSCOPY  2015    One 3-4mm hyperplastic polyp in the rectum; Repeat 5 years   • COLONOSCOPY  2007    Normal; Repeat 3-4 years   • COLONOSCOPY  2004    One 2cm pedunculated erythematous adenomatous polyp in the sigmoid colon; One 6mm hyperplastic polyp in the rectum; Repeat 3 years   • COLONOSCOPY N/A 2021    One 7mm tubular adenomatous polyp in the transverse colon; The examination was otherwise normal on direct and retroflexion views; Repeat 5 years   • DILATATION AND CURETTAGE     • ENDOSCOPY  2009    Normal endoscopy   • ENDOSCOPY  2004    GERD   • ENDOSCOPY N/A 2023    Procedure: ESOPHAGOGASTRODUODENOSCOPY WITH ANESTHESIA;  Surgeon: Kelly Kim MD;  Location: [facility] ENDOSCOPY;  Service: Gastroenterology;  Laterality: N/A;  preop; dysphagia  postop; HIatal hernia; schotski ring   pcp Yuni Pierson    • HERNIA REPAIR     • HYSTERECTOMY     • LAPAROSCOPIC LYSIS OF ADHESIONS     • OOPHORECTOMY     • OTHER SURGICAL HISTORY  2010    EUS-Dr. Hastings-Normal EUS evaluation of the pancreas, bile duct, and ampulla of Vater-pancreatitis remains idiopathic     Social History:  reports that she has never smoked. She has never used smokeless tobacco. She reports that she does not drink alcohol and does not use drugs.    Family History: family history includes Colon polyps in her father; Heart disease in her mother; Prostate cancer in her father; Stomach cancer (age of onset: 48) in her paternal grandmother.      Allergies:  Allergies   Allergen Reactions   • Tilactase GI Intolerance     Medications:  Prior to Admission medications    Medication Sig Start Date End Date Taking? Authorizing Provider   amLODIPine (NORVASC) 5 MG tablet Take 0.5 tablets by mouth 2 (Two) Times a Day. 22  Yes Jayla Null APRN   Barberry-Oreg Grape-Goldenseal (BERBERINE  "COMPLEX PO) Take 1 capsule by mouth Daily.   Yes Ann Marie Padgett MD   Coenzyme Q10 (CoQ-10) 100 MG capsule Take 1 capsule by mouth Daily.   Yes Ann Marie Padgett MD   Fenugreek 500 MG capsule Take 1 capsule by mouth Daily.   Yes Ann Marie Padgett MD   Insulin Pen Needle (Pen Needles) 32G X 4 MM misc 1 each 1 (One) Time Per Week. 12/20/22  Yes Yuni Sanhcez APRN   losartan (COZAAR) 100 MG tablet Take 1 tablet by mouth Daily. 10/3/22  Yes Elaine Randhawa   magnesium oxide (MAG-OX) 400 MG tablet Take 400 mg by mouth Daily.   Yes Ann Marie Padgett MD   metFORMIN ER (GLUCOPHAGE-XR) 500 MG 24 hr tablet Take 2 tablets by mouth Daily With Breakfast.  Patient taking differently: Take 500 mg by mouth 2 (Two) Times a Day. 7/28/22  Yes Elaine Randhawa   metoprolol tartrate (LOPRESSOR) 50 MG tablet TAKE 1/2 (ONE-HALF) TABLET BY MOUTH IN THE MORNING AND 1/2 (ONE-HALF) IN THE EVENING  Patient taking differently: Take 25 mg by mouth 2 (Two) Times a Day. 11/28/22  Yes Elaine Randhawa   omeprazole (priLOSEC) 40 MG capsule Take 1 capsule by mouth Daily. 1/5/23  Yes Genna Martinez APRN   Semaglutide,0.25 or 0.5MG/DOS, (Ozempic, 0.25 or 0.5 MG/DOSE,) 2 MG/1.5ML solution pen-injector Inject 0.5 mg under the skin into the appropriate area as directed 1 (One) Time Per Week. 2/21/23  Yes Yuni Sanchez APRN   vitamin B-12 (CYANOCOBALAMIN) 1000 MCG tablet Take 1,000 mcg by mouth Daily. Conifer   Yes Ann Marie Padgett MD   vitamin D3 125 MCG (5000 UT) capsule capsule Take 1 capsule by mouth Daily.   Yes Ann Marie Padgett MD       Objective     Vital Signs: /72 (BP Location: Left arm, Patient Position: Sitting, Cuff Size: Adult)   Pulse 87   Temp 97.1 °F (36.2 °C) (Temporal)   Ht 151.1 cm (59.5\")   Wt 76.2 kg (168 lb)   SpO2 97%   BMI 33.36 kg/m²   Physical Exam  Vitals and nursing note reviewed.   Constitutional:       General: She is not in acute distress.     Appearance: She is " obese. She is not ill-appearing or toxic-appearing.   HENT:      Head: Normocephalic and atraumatic.      Mouth/Throat:      Mouth: Mucous membranes are moist.      Pharynx: Oropharynx is clear.   Cardiovascular:      Rate and Rhythm: Normal rate and regular rhythm.      Pulses: Normal pulses.      Heart sounds: Normal heart sounds.   Pulmonary:      Effort: Pulmonary effort is normal.      Breath sounds: No wheezing, rhonchi or rales.   Abdominal:      General: Bowel sounds are normal. There is no distension.      Palpations: Abdomen is soft.      Tenderness: There is no abdominal tenderness.      Comments: Protuberant abdomen   Musculoskeletal:         General: No swelling or tenderness. Normal range of motion.      Cervical back: Normal range of motion and neck supple. No tenderness.   Skin:     General: Skin is warm and dry.      Findings: No erythema or rash.   Neurological:      General: No focal deficit present.      Mental Status: She is alert and oriented to person, place, and time.   Psychiatric:         Mood and Affect: Mood normal.         Behavior: Behavior normal.         Thought Content: Thought content normal.         Judgment: Judgment normal.       BMI is >= 30 and <35. (Class 1 Obesity). The following options were offered after discussion;: exercise counseling/recommendations and nutrition counseling/recommendations    Results Reviewed:  Hemoglobin A1c in the office today is 6.6.  Thoracic spine x-ray shows no acute osseous abnormality.  Multilevel degenerative changes in the mid and upper thoracic spine noted.    Assessment / Plan     Assessment/Plan:  Diagnoses and all orders for this visit:    1. Type 2 diabetes mellitus with hyperglycemia, without long-term current use of insulin (HCC) (Primary)  -     POC Glycated Hemoglobin, Total  -     Semaglutide,0.25 or 0.5MG/DOS, (Ozempic, 0.25 or 0.5 MG/DOSE,) 2 MG/1.5ML solution pen-injector; Inject 0.5 mg under the skin into the appropriate area as  directed 1 (One) Time Per Week.  Dispense: 1.5 mL; Refill: 2  -     metFORMIN ER (GLUCOPHAGE-XR) 500 MG 24 hr tablet; Take 1 tablet by mouth Daily With Breakfast.  Dispense: 180 tablet; Refill: 3    2. Pernicious anemia  -     Vitamin B12    3. Vitamin D insufficiency  -     Vitamin D 25 hydroxy    4. Acute midline thoracic back pain  -     XR Spine Thoracic 3 View; Future    5. Class 1 obesity due to excess calories with serious comorbidity and body mass index (BMI) of 33.0 to 33.9 in adult       The patient presents to the office today for a 4-week follow-up.  As she did have some difficulty with abdominal pain and nausea after increasing Ozempic from 0.25 mg to 0.5 mg we will continue on current dosage as she now seems to be tolerating this well.  Her hemoglobin A1c has improved remarkably in the last 3 months while on this medication.  A1c previously was 9.2 and is now down to 6.6.  She has continued to lose weight on this medication.  She has been taking metformin 1000 mg once daily, will decrease to 500 mg daily.  She will continue to monitor blood glucoses at home.  We will plan to follow-up in 3 months to reassess A1c, patient to call sooner if needed.    Blood pressure is well controlled in the office today at 124/72.  Continue present regimen with losartan 100 mg daily, amlodipine 2.5 mg twice daily and Lopressor 25 mg twice daily.    The patient does complain of pain between her shoulder blades.  She denies any acute injuries, strenuous activity or heavy lifting.  The pain is described as constant and worse with certain movements.  X-ray of the thoracic spine today did show multilevel degenerative changes.  No evidence of acute fracture.  We will treat for muscular strain.  Patient was advised that she could use Tylenol or ibuprofen as needed, lidocaine patches and heating pad as needed as well.  She can continue the turmeric supplement but advised using Voltaren gel if this is ineffective.  She is to  call for any worsening symptoms.  She does indicate that she has performed physical therapy for back pain in the past and I have asked her to reach out if symptoms do not improve with conservative measures at which point we can pursue therapy.    Return in about 3 months (around 5/21/2023) for Recheck- A1c. unless patient needs to be seen sooner or acute issues arise.    I have discussed the patient results/orders and and plan/recommendation with them at today's visit.      Yuni Sanchez, ARNALDO   02/21/2023

## 2023-02-28 RX ORDER — AMLODIPINE BESYLATE 5 MG/1
2.5 TABLET ORAL 2 TIMES DAILY
Qty: 30 TABLET | Refills: 11 | Status: SHIPPED | OUTPATIENT
Start: 2023-02-28

## 2023-03-17 DIAGNOSIS — E11.65 TYPE 2 DIABETES MELLITUS WITH HYPERGLYCEMIA, WITHOUT LONG-TERM CURRENT USE OF INSULIN: ICD-10-CM

## 2023-03-17 RX ORDER — SEMAGLUTIDE 1.34 MG/ML
INJECTION, SOLUTION SUBCUTANEOUS
Qty: 2 ML | Refills: 2 | Status: SHIPPED | OUTPATIENT
Start: 2023-03-17

## 2023-05-01 ENCOUNTER — OFFICE VISIT (OUTPATIENT)
Dept: CARDIOLOGY CLINIC | Age: 73
End: 2023-05-01
Payer: MEDICARE

## 2023-05-01 VITALS
OXYGEN SATURATION: 99 % | HEIGHT: 60 IN | DIASTOLIC BLOOD PRESSURE: 80 MMHG | SYSTOLIC BLOOD PRESSURE: 122 MMHG | WEIGHT: 182 LBS | HEART RATE: 76 BPM | BODY MASS INDEX: 35.73 KG/M2

## 2023-05-01 DIAGNOSIS — I10 ESSENTIAL HYPERTENSION: ICD-10-CM

## 2023-05-01 DIAGNOSIS — I34.1 MVP (MITRAL VALVE PROLAPSE): ICD-10-CM

## 2023-05-01 DIAGNOSIS — I25.10 CORONARY ARTERY DISEASE INVOLVING NATIVE CORONARY ARTERY OF NATIVE HEART WITHOUT ANGINA PECTORIS: ICD-10-CM

## 2023-05-01 DIAGNOSIS — R00.2 PALPITATIONS: Primary | ICD-10-CM

## 2023-05-01 PROCEDURE — 3074F SYST BP LT 130 MM HG: CPT | Performed by: NURSE PRACTITIONER

## 2023-05-01 PROCEDURE — 3079F DIAST BP 80-89 MM HG: CPT | Performed by: NURSE PRACTITIONER

## 2023-05-01 PROCEDURE — 1123F ACP DISCUSS/DSCN MKR DOCD: CPT | Performed by: NURSE PRACTITIONER

## 2023-05-01 PROCEDURE — 93000 ELECTROCARDIOGRAM COMPLETE: CPT | Performed by: NURSE PRACTITIONER

## 2023-05-01 PROCEDURE — 99214 OFFICE O/P EST MOD 30 MIN: CPT | Performed by: NURSE PRACTITIONER

## 2023-05-01 RX ORDER — OMEPRAZOLE 40 MG/1
40 CAPSULE, DELAYED RELEASE ORAL DAILY
COMMUNITY

## 2023-05-01 RX ORDER — SEMAGLUTIDE 1.34 MG/ML
0.5 INJECTION, SOLUTION SUBCUTANEOUS WEEKLY
COMMUNITY

## 2023-05-01 ASSESSMENT — ENCOUNTER SYMPTOMS
SHORTNESS OF BREATH: 0
CHEST TIGHTNESS: 0
SORE THROAT: 0
COUGH: 0
WHEEZING: 0

## 2023-05-01 NOTE — PROGRESS NOTES
[id number] [facility] Cardiology   Established Patient Office Visit  [address]  [address] [address] [facility]  [phone]        OFFICE VISIT:  2023    Jerome Canas - : 1950    Reason For Visit:  Basia Almeida is a 72 y.o. female who is here for 6 Month Follow-Up    1. Palpitations    2. Essential hypertension    3. MVP (mitral valve prolapse)    4. Coronary artery disease involving native coronary artery of native heart without angina pectoris      Patient with a history of mitral valve prolapse, diabetes, and hypertension. Patient has a family history of coronary artery disease which includes a brother, father and mother. She is a non-smoker. Patient also has a history of coronary artery disease having undergone cardiac catheterization in 2019 showing mild coronary artery disease, left ventricular function normal.        Patient had in 2020 Covid infection. Patient presents to clinic today for routine follow-up. Patient denies any chest pain, pressure or tightness. There is no shortness of breath, orthopnea or PND. Patient denies any lightheadedness, dizziness or syncope. Summary   Structurally normal.   Normal mitral valve leaflet mobility. Trace mitral regurgitation. Normal left ventricular size with preserved LV function and an estimated   ejection fraction of approximately 55-60%. No regional wall motion abnormalities identified. Grade I diastolic dysfunction. No evidence of left ventricular mass or thrombus noted.       Signature      ----------------------------------------------------------------   Electronically signed by Solo Barry MD(Interpreting   physician) on 2019 09:44 PM    Subjective    Jerome Canas is a 72 y.o. female with the following history as recorded in [employer]:    Patient Active Problem List    Diagnosis Date Noted    Abnormal nuclear cardiac imaging test     Chest pain 2019    Fibrocystic breast 2013    Panic

## 2023-05-24 ENCOUNTER — LAB (OUTPATIENT)
Dept: INTERNAL MEDICINE | Facility: CLINIC | Age: 73
End: 2023-05-24
Payer: MEDICARE

## 2023-05-24 DIAGNOSIS — E78.00 ELEVATED CHOLESTEROL: ICD-10-CM

## 2023-05-24 DIAGNOSIS — D51.0 PERNICIOUS ANEMIA: ICD-10-CM

## 2023-05-24 DIAGNOSIS — E11.65 TYPE 2 DIABETES MELLITUS WITH HYPERGLYCEMIA, WITHOUT LONG-TERM CURRENT USE OF INSULIN: Primary | ICD-10-CM

## 2023-05-24 DIAGNOSIS — I10 ESSENTIAL HYPERTENSION: ICD-10-CM

## 2023-05-25 LAB
ALBUMIN SERPL-MCNC: 4.6 G/DL (ref 3.5–5.2)
ALBUMIN/CREAT UR: <10 MG/G CREAT (ref 0–29)
ALBUMIN/GLOB SERPL: 2 G/DL
ALP SERPL-CCNC: 178 U/L (ref 39–117)
ALT SERPL-CCNC: 20 U/L (ref 1–33)
APPEARANCE UR: CLEAR
AST SERPL-CCNC: 31 U/L (ref 1–32)
BACTERIA #/AREA URNS HPF: NORMAL /HPF
BASOPHILS # BLD AUTO: 0.04 10*3/MM3 (ref 0–0.2)
BASOPHILS NFR BLD AUTO: 0.8 % (ref 0–1.5)
BILIRUB SERPL-MCNC: 0.5 MG/DL (ref 0–1.2)
BILIRUB UR QL STRIP: NEGATIVE
BUN SERPL-MCNC: 18 MG/DL (ref 8–23)
BUN/CREAT SERPL: 25.4 (ref 7–25)
CALCIUM SERPL-MCNC: 10.1 MG/DL (ref 8.6–10.5)
CASTS URNS MICRO: NORMAL
CHLORIDE SERPL-SCNC: 105 MMOL/L (ref 98–107)
CHOLEST SERPL-MCNC: 211 MG/DL (ref 0–200)
CO2 SERPL-SCNC: 29.5 MMOL/L (ref 22–29)
COLOR UR: YELLOW
CREAT SERPL-MCNC: 0.71 MG/DL (ref 0.57–1)
CREAT UR-MCNC: 31 MG/DL
EGFRCR SERPLBLD CKD-EPI 2021: 91 ML/MIN/1.73
EOSINOPHIL # BLD AUTO: 0.19 10*3/MM3 (ref 0–0.4)
EOSINOPHIL NFR BLD AUTO: 3.7 % (ref 0.3–6.2)
EPI CELLS #/AREA URNS HPF: NORMAL /HPF
ERYTHROCYTE [DISTWIDTH] IN BLOOD BY AUTOMATED COUNT: 12.9 % (ref 12.3–15.4)
GLOBULIN SER CALC-MCNC: 2.3 GM/DL
GLUCOSE SERPL-MCNC: 99 MG/DL (ref 65–99)
GLUCOSE UR QL STRIP: NEGATIVE
HBA1C MFR BLD: 6.2 % (ref 4.8–5.6)
HCT VFR BLD AUTO: 43.5 % (ref 34–46.6)
HDLC SERPL-MCNC: 33 MG/DL (ref 40–60)
HGB BLD-MCNC: 14.8 G/DL (ref 12–15.9)
HGB UR QL STRIP: NEGATIVE
IMM GRANULOCYTES # BLD AUTO: 0.01 10*3/MM3 (ref 0–0.05)
IMM GRANULOCYTES NFR BLD AUTO: 0.2 % (ref 0–0.5)
KETONES UR QL STRIP: NEGATIVE
LDLC SERPL CALC-MCNC: 112 MG/DL (ref 0–100)
LEUKOCYTE ESTERASE UR QL STRIP: NEGATIVE
LYMPHOCYTES # BLD AUTO: 1.8 10*3/MM3 (ref 0.7–3.1)
LYMPHOCYTES NFR BLD AUTO: 35 % (ref 19.6–45.3)
MAGNESIUM SERPL-MCNC: 2.3 MG/DL (ref 1.6–2.4)
MCH RBC QN AUTO: 31.3 PG (ref 26.6–33)
MCHC RBC AUTO-ENTMCNC: 34 G/DL (ref 31.5–35.7)
MCV RBC AUTO: 92 FL (ref 79–97)
MICROALBUMIN UR-MCNC: <3 UG/ML
MONOCYTES # BLD AUTO: 0.55 10*3/MM3 (ref 0.1–0.9)
MONOCYTES NFR BLD AUTO: 10.7 % (ref 5–12)
NEUTROPHILS # BLD AUTO: 2.55 10*3/MM3 (ref 1.7–7)
NEUTROPHILS NFR BLD AUTO: 49.6 % (ref 42.7–76)
NITRITE UR QL STRIP: NEGATIVE
NRBC BLD AUTO-RTO: 0 /100 WBC (ref 0–0.2)
PH UR STRIP: 7.5 [PH] (ref 5–8)
PLATELET # BLD AUTO: 275 10*3/MM3 (ref 140–450)
POTASSIUM SERPL-SCNC: 4.4 MMOL/L (ref 3.5–5.2)
PROT SERPL-MCNC: 6.9 G/DL (ref 6–8.5)
PROT UR QL STRIP: NEGATIVE
RBC # BLD AUTO: 4.73 10*6/MM3 (ref 3.77–5.28)
RBC #/AREA URNS HPF: NORMAL /HPF
SODIUM SERPL-SCNC: 143 MMOL/L (ref 136–145)
SP GR UR STRIP: 1.01 (ref 1–1.03)
TRIGL SERPL-MCNC: 378 MG/DL (ref 0–150)
TSH SERPL DL<=0.005 MIU/L-ACNC: 2.24 UIU/ML (ref 0.27–4.2)
UROBILINOGEN UR STRIP-MCNC: NORMAL MG/DL
VLDLC SERPL CALC-MCNC: 66 MG/DL (ref 5–40)
WBC # BLD AUTO: 5.14 10*3/MM3 (ref 3.4–10.8)
WBC #/AREA URNS HPF: NORMAL /HPF

## 2023-05-26 NOTE — PROGRESS NOTES
A1c is the lowest it has been in over 2 years, 6.2! Lipid panel has shown great improvement compared to 6 months ago. Other labs overall look great! We can discuss these in more detail and any specific concerns she is having at her appt next week.

## 2023-05-30 ENCOUNTER — OFFICE VISIT (OUTPATIENT)
Dept: INTERNAL MEDICINE | Facility: CLINIC | Age: 73
End: 2023-05-30

## 2023-05-30 VITALS
DIASTOLIC BLOOD PRESSURE: 70 MMHG | BODY MASS INDEX: 31.61 KG/M2 | HEIGHT: 60 IN | OXYGEN SATURATION: 97 % | WEIGHT: 161 LBS | HEART RATE: 74 BPM | SYSTOLIC BLOOD PRESSURE: 110 MMHG | TEMPERATURE: 96.9 F

## 2023-05-30 DIAGNOSIS — Z12.31 ENCOUNTER FOR SCREENING MAMMOGRAM FOR MALIGNANT NEOPLASM OF BREAST: Primary | ICD-10-CM

## 2023-05-30 DIAGNOSIS — E66.09 CLASS 1 OBESITY DUE TO EXCESS CALORIES WITH SERIOUS COMORBIDITY AND BODY MASS INDEX (BMI) OF 31.0 TO 31.9 IN ADULT: ICD-10-CM

## 2023-05-30 DIAGNOSIS — E11.65 TYPE 2 DIABETES MELLITUS WITH HYPERGLYCEMIA, WITHOUT LONG-TERM CURRENT USE OF INSULIN: ICD-10-CM

## 2023-05-30 DIAGNOSIS — I10 ESSENTIAL HYPERTENSION: ICD-10-CM

## 2023-05-30 DIAGNOSIS — Z78.0 POSTMENOPAUSAL: ICD-10-CM

## 2023-05-30 DIAGNOSIS — R53.82 CHRONIC FATIGUE: ICD-10-CM

## 2023-05-30 DIAGNOSIS — E03.9 ACQUIRED HYPOTHYROIDISM: ICD-10-CM

## 2023-05-31 ENCOUNTER — TELEPHONE (OUTPATIENT)
Dept: INTERNAL MEDICINE | Facility: CLINIC | Age: 73
End: 2023-05-31

## 2023-05-31 RX ORDER — AMLODIPINE BESYLATE 2.5 MG/1
2.5 TABLET ORAL 2 TIMES DAILY
Qty: 60 TABLET | Refills: 5 | Status: SHIPPED | OUTPATIENT
Start: 2023-05-31

## 2023-05-31 NOTE — TELEPHONE ENCOUNTER
Caller: Pauline Molina    Relationship: Self    Best call back number: [phone]    What is the best time to reach you: ANYTIME    Who are you requesting to speak with (clinical staff, provider,  specific staff member): CLINICAL    What was the call regarding: STATES THAT THE PHARMACY DID NOT RECEIVE NEW SCRIPT FOR AMLODIPINE THAT WAS DISCUSSED AT APPOINTMENT ON 05.31.2023. WANTED TO GET CLARIFICATION    Is it okay if the provider responds through [facility]: YES

## 2023-05-31 NOTE — PROGRESS NOTES
The ABCs of the Annual Wellness Visit  Subsequent Medicare Wellness Visit    Subjective    Pauline Molina is a 73 y.o. female who presents for a Subsequent Medicare Wellness Visit.    The following portions of the patient's history were reviewed and   updated as appropriate: allergies, current medications, past family history, past medical history, past social history, past surgical history, and problem list.    Compared to one year ago, the patient feels her physical   health is better.    Compared to one year ago, the patient feels her mental   health is the same.    Recent Hospitalizations:  She was not admitted to the hospital during the last year.       Current Medical Providers:  Patient Care Team:  Yuni Sanchez APRN as PCP - General (Nurse Practitioner)    Outpatient Medications Prior to Visit   Medication Sig Dispense Refill    Barberry-Oreg Grape-Goldenseal (BERBERINE COMPLEX PO) Take 1 capsule by mouth Daily.      Coenzyme Q10 (CoQ-10) 100 MG capsule Take 1 capsule by mouth Daily.      Fenugreek 500 MG capsule Take 1 capsule by mouth Daily.      Insulin Pen Needle (Pen Needles) 32G X 4 MM misc 1 each 1 (One) Time Per Week. 30 each 0    losartan (COZAAR) 100 MG tablet Take 1 tablet by mouth Daily. 90 tablet 3    magnesium oxide (MAG-OX) 400 MG tablet Take 1 tablet by mouth Daily.      metFORMIN ER (GLUCOPHAGE-XR) 500 MG 24 hr tablet Take 1 tablet by mouth Daily With Breakfast. 180 tablet 3    metoprolol tartrate (LOPRESSOR) 50 MG tablet TAKE 1/2 (ONE-HALF) TABLET BY MOUTH IN THE MORNING AND 1/2 (ONE-HALF) IN THE EVENING (Patient taking differently: Take 25 mg by mouth 2 (Two) Times a Day.) 90 tablet 3    omeprazole (priLOSEC) 40 MG capsule Take 1 capsule by mouth Daily. 30 capsule 11    Ozempic, 0.25 or 0.5 MG/DOSE, 2 MG/1.5ML solution pen-injector INJECT 0.5 MG SUBCUTANEOUSLY AS DIRECTED ONCE A WEEK 2 mL 2    Unable to find 1 each 1 (One) Time. Liver Cleanse   Gall bladder      vitamin D3 125 MCG  (5000 UT) capsule capsule Take 1 capsule by mouth Daily.      amLODIPine (NORVASC) 5 MG tablet Take 0.5 tablets by mouth 2 (Two) Times a Day. 30 tablet 11    vitamin B-12 (CYANOCOBALAMIN) 1000 MCG tablet Take 1,000 mcg by mouth Daily. Spray (Patient not taking: Reported on 5/30/2023)       No facility-administered medications prior to visit.       No opioid medication identified on active medication list. I have reviewed chart for other potential  high risk medication/s and harmful drug interactions in the elderly.        Aspirin is not on active medication list.  Aspirin use is not indicated based on review of current medical condition/s. Risk of harm outweighs potential benefits.  .    Patient Active Problem List   Diagnosis    Abnormal nuclear cardiac imaging test    Arnold-Chiari deformity    Allergic rhinitis, mild    At low risk for fall    Type 2 diabetes mellitus with hyperglycemia, without long-term current use of insulin (HCC)    Essential hypertension    Fatty liver disease, nonalcoholic    Fibrocystic breast    Headache    High triglycerides    History of colon polyps    Mitral valve prolapse    Multiple joint pain    Palpitation    Negative depression screening    Panic attack    Postmenopausal status    History of adenomatous polyp of colon    Family history of polyps in the colon    Chest pain    Degeneration of intervertebral disc    Encounter for screening mammogram for malignant neoplasm of breast    Muscle pain    Pseudotumor cerebri    Right leg pain    Acquired hypothyroidism    Esophageal dysphagia    Elevated LFTs    Gastroesophageal reflux disease without esophagitis    Nausea    Class 1 obesity due to excess calories with serious comorbidity and body mass index (BMI) of 31.0 to 31.9 in adult     Advance Care Planning   Advance Care Planning     Advance Directive is not on file.  ACP discussion was held with the patient during this visit. Patient has an advance directive (not in EMR), copy  "requested.     Objective    Vitals:    23 1303   BP: 110/70   BP Location: Left arm   Patient Position: Sitting   Cuff Size: Adult   Pulse: 74   Temp: 96.9 °F (36.1 °C)   TempSrc: Temporal   SpO2: 97%   Weight: 73 kg (161 lb)   Height: 151.1 cm (59.5\")   PainSc: 0-No pain     Estimated body mass index is 31.97 kg/m² as calculated from the following:    Height as of this encounter: 151.1 cm (59.5\").    Weight as of this encounter: 73 kg (161 lb).    BMI is >= 30 and <35. (Class 1 Obesity). The following options were offered after discussion;: exercise counseling/recommendations and nutrition counseling/recommendations      Does the patient have evidence of cognitive impairment? No    Lab Results   Component Value Date    CHLPL 211 (H) 2023    TRIG 378 (H) 2023    HDL 33 (L) 2023     (H) 2023    VLDL 66 (H) 2023    HGBA1C 6.20 (H) 2023        HEALTH RISK ASSESSMENT    Smoking Status:  Social History     Tobacco Use   Smoking Status Never   Smokeless Tobacco Never     Alcohol Consumption:  Social History     Substance and Sexual Activity   Alcohol Use Never     Fall Risk Screen:    LEONARDO Fall Risk Assessment was completed, and patient is at LOW risk for falls.Assessment completed on:2023    Depression Screenin/30/2023     1:13 PM   PHQ-2/PHQ-9 Depression Screening   Little Interest or Pleasure in Doing Things 0-->not at all   Feeling Down, Depressed or Hopeless 0-->not at all   PHQ-9: Brief Depression Severity Measure Score 0       Health Habits and Functional and Cognitive Screenin/30/2023     1:13 PM   Functional & Cognitive Status   Do you have difficulty preparing food and eating? No   Do you have difficulty bathing yourself, getting dressed or grooming yourself? No   Do you have difficulty using the toilet? No   Do you have difficulty moving around from place to place? No   Do you have trouble with steps or getting out of a bed or a chair? No "   Current Diet Well Balanced Diet   Dental Exam Up to date   Eye Exam Up to date   Exercise (times per week) 0 times per week   Current Exercises Include No Regular Exercise   Do you need help using the phone?  No   Are you deaf or do you have serious difficulty hearing?  No   Do you need help with transportation? No   Do you need help shopping? No   Do you need help preparing meals?  No   Do you need help with housework?  No   Do you need help with laundry? No   Do you need help taking your medications? No   Do you need help managing money? No   Do you ever drive or ride in a car without wearing a seat belt? No   Have you felt unusual stress, anger or loneliness in the last month? No   Who do you live with? Spouse   If you need help, do you have trouble finding someone available to you? No   Have you been bothered in the last four weeks by sexual problems? No   Do you have difficulty concentrating, remembering or making decisions? No       Age-appropriate Screening Schedule:  Refer to the list below for future screening recommendations based on patient's age, sex and/or medical conditions. Orders for these recommended tests are listed in the plan section. The patient has been provided with a written plan.    Health Maintenance   Topic Date Due    ZOSTER VACCINE (1 of 2) Never done    DXA SCAN  09/03/2021    MAMMOGRAM  04/26/2023    COVID-19 Vaccine (1) 04/15/2024 (Originally 1950)    Pneumococcal Vaccine 65+ (1 - PCV) 05/30/2024 (Originally 1/14/1956)    TDAP/TD VACCINES (1 - Tdap) 05/30/2024 (Originally 1/14/1969)    INFLUENZA VACCINE  08/01/2023    HEMOGLOBIN A1C  11/24/2023    DIABETIC EYE EXAM  04/24/2024    LIPID PANEL  05/24/2024    URINE MICROALBUMIN  05/24/2024    ANNUAL WELLNESS VISIT  05/30/2024    DIABETIC FOOT EXAM  05/30/2024    COLORECTAL CANCER SCREENING  06/18/2026    HEPATITIS C SCREENING  Completed                  CMS Preventative Services Quick Reference  Risk Factors Identified During  "Encounter  Immunizations Discussed/Encouraged: Tdap, Prevnar 20 (Pneumococcal 20-valent conjugate), and Shingrix  Inactivity/Sedentary: Patient was advised to exercise at least 150 minutes a week per CDC recommendations.  The above risks/problems have been discussed with the patient.  Pertinent information has been shared with the patient in the After Visit Summary.  An After Visit Summary and PPPS were made available to the patient.    Follow Up:   Next Medicare Wellness visit to be scheduled in 1 year.       Additional E&M Note during same encounter follows:  Patient has multiple medical problems which are significant and separately identifiable that require additional work above and beyond the Medicare Wellness Visit.      Chief Complaint  Medicare Wellness-subsequent (Toprol- wanting 25 dose instead of splitting) and Fatigue    Subjective        HPI  Pauline Molina is also being seen today for diabetes and fatigue.  The patient has been taking Ozempic for around 6 months and has been able to lose 18 pounds on this medication.  She has made great improvement in her hemoglobin A1c, dropping around 3 points since starting the medication.  She notes her morning fasting blood glucoses are around 100.  She did have some difficulty with side effects after initially starting 0.5 mg dosage of Ozempic, but this has improved.  She states she still does have some nausea and abdominal pain after eating at times.  She denies any vomiting.  Patient also states that she has noted worsening fatigue while on Ozempic.         Objective   Vital Signs:  /70 (BP Location: Left arm, Patient Position: Sitting, Cuff Size: Adult)   Pulse 74   Temp 96.9 °F (36.1 °C) (Temporal)   Ht 151.1 cm (59.5\")   Wt 73 kg (161 lb)   SpO2 97%   BMI 31.97 kg/m²     Physical Exam  Vitals and nursing note reviewed.   Constitutional:       General: She is not in acute distress.     Appearance: She is obese. She is not ill-appearing or " toxic-appearing.   HENT:      Head: Normocephalic and atraumatic.      Mouth/Throat:      Mouth: Mucous membranes are moist.      Pharynx: Oropharynx is clear.   Cardiovascular:      Rate and Rhythm: Normal rate and regular rhythm.      Pulses: Normal pulses.      Heart sounds: Normal heart sounds.   Pulmonary:      Effort: Pulmonary effort is normal.      Breath sounds: No wheezing, rhonchi or rales.   Abdominal:      General: Bowel sounds are normal. There is no distension.      Palpations: Abdomen is soft.      Tenderness: There is no abdominal tenderness.   Musculoskeletal:         General: No swelling or tenderness. Normal range of motion.      Cervical back: Normal range of motion and neck supple. No tenderness.   Feet:      Right foot:      Protective Sensation: 10 sites tested.  10 sites sensed.      Left foot:      Protective Sensation: 10 sites tested.  10 sites sensed.   Skin:     General: Skin is warm and dry.      Findings: No erythema or rash.   Neurological:      General: No focal deficit present.      Mental Status: She is alert and oriented to person, place, and time.   Psychiatric:         Mood and Affect: Mood normal.         Behavior: Behavior normal.         Thought Content: Thought content normal.         Judgment: Judgment normal.        The following data was reviewed by: ARNALDO Vallejo on 05/30/2023:  CMP          11/15/2022    08:53 5/24/2023    10:28   CMP   Glucose 217   99     BUN 19   18     Creatinine 0.72   0.71     Sodium 136   143     Potassium 4.2   4.4     Chloride 99   105     Calcium 9.3   10.1     Total Protein 6.8   6.9     Albumin 4.60   4.6     Globulin 2.2   2.3     Total Bilirubin 0.5   0.5     Alkaline Phosphatase 149   178     AST (SGOT) 44   31     ALT (SGPT) 28   20     BUN/Creatinine Ratio 26.4   25.4       CBC          5/24/2023    10:28   CBC   WBC 5.14     RBC 4.73     Hemoglobin 14.8     Hematocrit 43.5     MCV 92.0     MCH 31.3     MCHC 34.0     RDW 12.9      Platelets 275       Lipid Panel          11/15/2022    08:53 5/24/2023    10:28   Lipid Panel   Total Cholesterol 251   211     Triglycerides 419   378     HDL Cholesterol 32   33     VLDL Cholesterol 78   66     LDL Cholesterol  141   112       TSH          5/24/2023    10:28   TSH   TSH 2.240       Most Recent A1C          5/24/2023    10:28   HGBA1C Most Recent   Hemoglobin A1C 6.20       UA          5/24/2023    10:28   Urinalysis   Blood, UA Negative     Leukocytes, UA Negative     Nitrite, UA Negative     RBC, UA 0-2     Bacteria, UA Comment       Data reviewed : Radiologic studies Mammogram 4/26/2021-no evidence of malignancy.  and GI studies EGD 2/13/2023 showed a small hiatal hernia, nonobstructing Schatzki ring which was dilated, normal stomach and duodenum.  Colonoscopy from 6/18/2021 showed a 7 mm polyp in the transverse colon, examination otherwise normal.           Assessment and Plan   Diagnoses and all orders for this visit:    1. Encounter for screening mammogram for malignant neoplasm of breast (Primary)  -     Mammo Screening Digital Tomosynthesis Bilateral With CAD; Future    2. Postmenopausal  -     DEXA Bone Density Axial; Future    3. Essential hypertension    4. Type 2 diabetes mellitus with hyperglycemia, without long-term current use of insulin (HCC)    5. Acquired hypothyroidism    6. Class 1 obesity due to excess calories with serious comorbidity and body mass index (BMI) of 31.0 to 31.9 in adult    7. Chronic fatigue    Other orders  -     amLODIPine (NORVASC) 2.5 MG tablet; Take 1 tablet by mouth 2 (Two) Times a Day.  Dispense: 60 tablet; Refill: 5      Patient presents to the office today for subsequent Medicare wellness exam.  We reviewed her labs which did show normal renal function and electrolytes.  Alkaline phosphatase was slightly elevated at 178, has been elevated in the past as well.  The patient has been having some abdominal pain and nausea with Ozempic, other liver  enzymes are normal.  She does have a history of pancreatitis. Would not suspect pancreatitis at this time given symptoms are very mild and felt more likely to be consistent with side effect of Ozempic.  Patient has made great improvement in her hemoglobin A1c since starting Ozempic in November.  Her hemoglobin A1c has gone from 9.2-6.2.  Due to side effects of medication, the patient will finish out the 2 doses of Ozempic that she has left, then will discontinue.  She will then continue on metformin 500 mg twice daily and will continue efforts with diet and exercise.  We will see if the patient's fatigue improves off of Ozempic.  She has been able to lose 18 pounds while on Ozempic.  The patient has had an eye exam this year.  Foot exam performed today.  Normal microalbumin/creatinine ratio.    The patient's cholesterol has made great improvement with her weight loss.  Her total cholesterol has gone from 251-211, LDL from 141-112 and triglycerides from 419-378.  Again patient will continue efforts with diet and exercise as she does not want to take medication for cholesterol at this time.    Blood pressures well controlled in the office today at 110/70. Patient to continue losartan 100 mg daily, Lopressor 25 mg twice daily and amlodipine 2.5 mg twice daily.  If the patient's blood pressure continues to remain well controlled and she continue to make efforts with weight loss, we may be able to start weaning some of her blood pressure medications off. She will continue to monitor her blood pressure at home.    The patient is up-to-date on colonoscopy, last performed in 2021.  Patient did have a 7 mm polyp in the transverse colon which was adenomatous.  Recommended to repeat in 5 years.  Last mammogram was performed in 2021 which was negative.  Patient is due for repeat mammogram, which has been ordered.  The patient is unsure when she last had a DEXA scan, last one on file was from 2013 which did show osteopenia.   Repeat DEXA has been ordered as well.     I spent 60 minutes caring for Pauline on this date of service. This time includes time spent by me in the following activities:preparing for the visit, reviewing tests, obtaining and/or reviewing a separately obtained history, performing a medically appropriate examination and/or evaluation , counseling and educating the patient/family/caregiver, ordering medications, tests, or procedures, and documenting information in the medical record  Follow Up   Return in about 3 months (around 8/30/2023) for Recheck- A1c,htn.  Patient was given instructions and counseling regarding her condition or for health maintenance advice. Please see specific information pulled into the AVS if appropriate.

## 2023-08-29 ENCOUNTER — OFFICE VISIT (OUTPATIENT)
Dept: INTERNAL MEDICINE | Facility: CLINIC | Age: 73
End: 2023-08-29
Payer: MEDICARE

## 2023-08-29 VITALS
BODY MASS INDEX: 31.69 KG/M2 | DIASTOLIC BLOOD PRESSURE: 68 MMHG | TEMPERATURE: 97.1 F | SYSTOLIC BLOOD PRESSURE: 122 MMHG | WEIGHT: 157.2 LBS | OXYGEN SATURATION: 95 % | HEART RATE: 76 BPM | HEIGHT: 59 IN

## 2023-08-29 DIAGNOSIS — E66.09 CLASS 1 OBESITY DUE TO EXCESS CALORIES WITH SERIOUS COMORBIDITY AND BODY MASS INDEX (BMI) OF 31.0 TO 31.9 IN ADULT: ICD-10-CM

## 2023-08-29 DIAGNOSIS — E11.65 TYPE 2 DIABETES MELLITUS WITH HYPERGLYCEMIA, WITHOUT LONG-TERM CURRENT USE OF INSULIN: Primary | ICD-10-CM

## 2023-08-29 DIAGNOSIS — I10 ESSENTIAL HYPERTENSION: ICD-10-CM

## 2023-08-29 LAB — HBA1C MFR BLD: 5.7 %

## 2023-08-29 RX ORDER — AMLODIPINE BESYLATE 2.5 MG/1
TABLET ORAL
Qty: 60 TABLET | Refills: 5
Start: 2023-08-29

## 2023-08-29 NOTE — PROGRESS NOTES
Subjective     Chief Complaint:  Hypertension.  Diabetes.    HPI:  The patient presents to the office today for a 3-month follow-up regarding hypertension and diabetes management.  Please see assessment and plan below.    Patient's PMR from outside medical facility reviewed and noted.    Past Medical History:   Past Medical History:   Diagnosis Date    Arrhythmia     Family history of colonic polyps     GERD (gastroesophageal reflux disease)     History of adenomatous polyp of colon     History of colon polyps     Hyperlipidemia     Hypertension     MVP (mitral valve prolapse)     Pseudotumor cerebri     Type 2 diabetes mellitus      Past Surgical History:  Past Surgical History:   Procedure Laterality Date    CARDIAC CATHETERIZATION      CATARACT EXTRACTION Right 08/15/2021    CATARACT EXTRACTION Left 2021     SECTION      CHOLECYSTECTOMY      COLONOSCOPY  2015    One 3-4mm hyperplastic polyp in the rectum; Repeat 5 years    COLONOSCOPY  2007    Normal; Repeat 3-4 years    COLONOSCOPY  2004    One 2cm pedunculated erythematous adenomatous polyp in the sigmoid colon; One 6mm hyperplastic polyp in the rectum; Repeat 3 years    COLONOSCOPY N/A 2021    One 7mm tubular adenomatous polyp in the transverse colon; The examination was otherwise normal on direct and retroflexion views; Repeat 5 years    DILATATION AND CURETTAGE      ENDOSCOPY  2009    Normal endoscopy    ENDOSCOPY  2004    GERD    ENDOSCOPY N/A 2023    Procedure: ESOPHAGOGASTRODUODENOSCOPY WITH ANESTHESIA;  Surgeon: Kelly Kim MD;  Location: [facility] ENDOSCOPY;  Service: Gastroenterology;  Laterality: N/A;  preop; dysphagia  postop; HIatal hernia; schotski ring   pcp Yuni Woetlz     HERNIA REPAIR      HYSTERECTOMY      LAPAROSCOPIC LYSIS OF ADHESIONS      OOPHORECTOMY      OTHER SURGICAL HISTORY  2010    EUS-Dr. Hastings-Normal EUS evaluation of the pancreas, bile duct, and ampulla of  Vater-pancreatitis remains idiopathic     Social History:  reports that she has never smoked. She has never used smokeless tobacco. She reports that she does not drink alcohol and does not use drugs.    Family History: family history includes Colon polyps in her father; Heart disease in her mother; Prostate cancer in her father; Stomach cancer (age of onset: 48) in her paternal grandmother.      Allergies:  Allergies   Allergen Reactions    Tilactase GI Intolerance     Medications:  Prior to Admission medications    Medication Sig Start Date End Date Taking? Authorizing Provider   amLODIPine (NORVASC) 2.5 MG tablet Take 1 tablet once daily for 1 week, then stop 8/29/23  Yes Yuni Sanchez APRN   Barberry-Oreg Grape-Goldenseal (BERBERINE COMPLEX PO) Take 1 capsule by mouth Daily.   Yes Ann Marie Padgett MD   Coenzyme Q10 (CoQ-10) 100 MG capsule Take 1 capsule by mouth Daily.   Yes Ann Marie Padgett MD   Fenugreek 500 MG capsule Take 1 capsule by mouth Daily.   Yes Ann Marie Padgett MD   Insulin Pen Needle (Pen Needles) 32G X 4 MM misc 1 each 1 (One) Time Per Week. 12/20/22  Yes Yuni Sanchez APRN   losartan (COZAAR) 100 MG tablet Take 1 tablet by mouth Daily. 10/3/22  Yes Elaine Randhawa   magnesium oxide (MAG-OX) 400 MG tablet Take 1 tablet by mouth Daily.   Yes Ann Marie Padgett MD   metoprolol tartrate (LOPRESSOR) 50 MG tablet TAKE 1/2 (ONE-HALF) TABLET BY MOUTH IN THE MORNING AND 1/2 (ONE-HALF) IN THE EVENING  Patient taking differently: Take 0.5 tablets by mouth 2 (Two) Times a Day. 11/28/22  Yes Elaine Randhawa   omeprazole (priLOSEC) 40 MG capsule Take 1 capsule by mouth Daily. 1/5/23  Yes Genna Martinez APRN   Unable to find 1 each 1 (One) Time. Liver Cleanse   Gall bladder   Yes Ann Marie Padgett MD   vitamin B-12 (CYANOCOBALAMIN) 1000 MCG tablet Take 1 tablet by mouth Daily. [place]   Yes Ann Marie Padgett MD   vitamin D3 125 MCG (5000 UT) capsule capsule Take 1  "capsule by mouth Daily.   Yes Provider, MD Ann Marie   amLODIPine (NORVASC) 2.5 MG tablet Take 1 tablet by mouth 2 (Two) Times a Day. 5/31/23 8/29/23 Yes Yuni Sanchez APRN   metFORMIN ER (GLUCOPHAGE-XR) 500 MG 24 hr tablet Take 1 tablet by mouth Daily With Breakfast. 2/22/23 8/29/23 Yes Yuni Sanchez APRN   Ozempic, 0.25 or 0.5 MG/DOSE, 2 MG/1.5ML solution pen-injector INJECT 0.5 MG SUBCUTANEOUSLY AS DIRECTED ONCE A WEEK 3/17/23 8/29/23 Yes Yuni Sanchez APRN       Objective     Vital Signs: /68 (BP Location: Left arm, Patient Position: Sitting, Cuff Size: Adult)   Pulse 76   Temp 97.1 øF (36.2 øC) (Infrared)   Ht 151.1 cm (59.49\")   Wt 71.3 kg (157 lb 3.2 oz)   SpO2 95%   BMI 31.23 kg/mý   Physical Exam  Vitals and nursing note reviewed.   Constitutional:       General: She is not in acute distress.     Appearance: She is obese. She is not ill-appearing or toxic-appearing.   HENT:      Head: Normocephalic and atraumatic.      Mouth/Throat:      Mouth: Mucous membranes are moist.      Pharynx: Oropharynx is clear.   Cardiovascular:      Rate and Rhythm: Normal rate and regular rhythm.      Pulses: Normal pulses.      Heart sounds: Normal heart sounds.   Pulmonary:      Effort: Pulmonary effort is normal.      Breath sounds: No wheezing, rhonchi or rales.   Abdominal:      General: Bowel sounds are normal. There is no distension.      Palpations: Abdomen is soft.      Tenderness: There is no abdominal tenderness.   Musculoskeletal:         General: No swelling or tenderness. Normal range of motion.      Cervical back: Normal range of motion and neck supple. No tenderness.   Skin:     General: Skin is warm and dry.      Findings: No erythema or rash.   Neurological:      General: No focal deficit present.      Mental Status: She is alert and oriented to person, place, and time.   Psychiatric:         Mood and Affect: Mood normal.         Behavior: Behavior normal.         Thought " Content: Thought content normal.         Judgment: Judgment normal.     Results Reviewed:  POC Glycated Hemoglobin, Total (08/29/2023 13:29)     Assessment / Plan     Assessment/Plan:  Diagnoses and all orders for this visit:    1. Type 2 diabetes mellitus with hyperglycemia, without long-term current use of insulin (Primary)  -     POC Glycated Hemoglobin, Total    2. Essential hypertension  -     amLODIPine (NORVASC) 2.5 MG tablet; Take 1 tablet once daily for 1 week, then stop  Dispense: 60 tablet; Refill: 5    3. Class 1 obesity due to excess calories with serious comorbidity and body mass index (BMI) of 31.0 to 31.9 in adult      The patient presents to the office today for 3-month follow-up regarding diabetes and hypertension management.  Her hemoglobin A1c was 6.2 at her last office visit and is now down to 5.7.  The patient had done very well taking Ozempic from November to June in terms of weight loss and A1c reduction.  She was concerned as she was still having some abdominal pain and nausea despite trying to eat smaller portions so we did discontinue this after her last office visit.  Since being off of Ozempic, the patient has continued to lose weight, down 4 more pounds since her last office visit.  She has been congratulated on this and will continue dietary and lifestyle modifications to continue weight loss.  At this time, I would feel comfortable also taking the patient off of metformin 500 mg once daily and see how she is able to do over the next 3 months with diet and exercise alone.    The patient's blood pressure is well controlled in the office today at 122/68.  She states at home her blood pressure typically runs less than 120 systolic.  She denies any shortness of breath, chest pain, dizziness/lightheadedness or headaches.  She is wondering if we could perhaps take her off of blood pressure medication as well.  I would like to leave her on losartan and metoprolol for now.  She does follow  with cardiology at [facility] for previous history of palpitations and PVCs.  We will wean her amlodipine over the next week and have asked her to let me know if her blood pressure starts running greater than 130/80 consistently while off amlodipine.    Return in about 3 months (around 11/29/2023) for Recheck. unless patient needs to be seen sooner or acute issues arise.    I have discussed the patient results/orders and and plan/recommendation with them at today's visit.      Yuni Sanchez, APRN   08/29/2023

## 2023-10-10 RX ORDER — LOSARTAN POTASSIUM 100 MG/1
100 TABLET ORAL DAILY
Qty: 90 TABLET | Refills: 3 | Status: SHIPPED | OUTPATIENT
Start: 2023-10-10

## 2023-10-17 ENCOUNTER — TELEPHONE (OUTPATIENT)
Dept: INTERNAL MEDICINE | Facility: CLINIC | Age: 73
End: 2023-10-17
Payer: MEDICARE

## 2023-10-17 DIAGNOSIS — I10 ESSENTIAL HYPERTENSION: ICD-10-CM

## 2023-10-17 RX ORDER — AMLODIPINE BESYLATE 2.5 MG/1
2.5 TABLET ORAL 2 TIMES DAILY
Qty: 60 TABLET | Refills: 11
Start: 2023-10-17

## 2023-10-17 NOTE — TELEPHONE ENCOUNTER
Tried calling pt back, received message from Lazara Blanco in scheduling that pt was requesting to speak with me. I am unsure of what pt was calling for  LVM asking pt to please call back

## 2023-11-30 ENCOUNTER — OFFICE VISIT (OUTPATIENT)
Dept: INTERNAL MEDICINE | Facility: CLINIC | Age: 73
End: 2023-11-30
Payer: MEDICARE

## 2023-11-30 VITALS
TEMPERATURE: 97.9 F | HEART RATE: 75 BPM | SYSTOLIC BLOOD PRESSURE: 148 MMHG | OXYGEN SATURATION: 97 % | WEIGHT: 157.4 LBS | BODY MASS INDEX: 31.73 KG/M2 | DIASTOLIC BLOOD PRESSURE: 92 MMHG | HEIGHT: 59 IN

## 2023-11-30 DIAGNOSIS — E11.65 TYPE 2 DIABETES MELLITUS WITH HYPERGLYCEMIA, WITHOUT LONG-TERM CURRENT USE OF INSULIN: Primary | ICD-10-CM

## 2023-11-30 DIAGNOSIS — I10 ESSENTIAL HYPERTENSION: ICD-10-CM

## 2023-11-30 DIAGNOSIS — E66.09 CLASS 1 OBESITY DUE TO EXCESS CALORIES WITH SERIOUS COMORBIDITY AND BODY MASS INDEX (BMI) OF 31.0 TO 31.9 IN ADULT: ICD-10-CM

## 2023-11-30 LAB
EXPIRATION DATE: ABNORMAL
HBA1C MFR BLD: 6.2 % (ref 4.5–5.7)
Lab: ABNORMAL

## 2023-12-01 LAB
BUN SERPL-MCNC: 22 MG/DL (ref 8–23)
BUN/CREAT SERPL: 27.8 (ref 7–25)
CALCIUM SERPL-MCNC: 9.7 MG/DL (ref 8.6–10.5)
CHLORIDE SERPL-SCNC: 102 MMOL/L (ref 98–107)
CO2 SERPL-SCNC: 27.2 MMOL/L (ref 22–29)
CREAT SERPL-MCNC: 0.79 MG/DL (ref 0.57–1)
EGFRCR SERPLBLD CKD-EPI 2021: 80.1 ML/MIN/1.73
GLUCOSE SERPL-MCNC: 109 MG/DL (ref 65–99)
POTASSIUM SERPL-SCNC: 5.2 MMOL/L (ref 3.5–5.2)
SODIUM SERPL-SCNC: 138 MMOL/L (ref 136–145)

## 2023-12-01 NOTE — PROGRESS NOTES
Kidney function and electrolytes look good overall.  Please encourage patient to drink at least 64 ounces of water per day.  Continue current medications.

## 2023-12-01 NOTE — PROGRESS NOTES
Subjective     Chief Complaint:  Dental pain    HPI:  Patient presents to the office today for 3-month follow-up.  She does complain of some dental pain from recently having a crown replaced, but otherwise denies any acute concerns today.  Please see assessment and plan below.    Patient's PMR from outside medical facility reviewed and noted.    Past Medical History:   Past Medical History:   Diagnosis Date    Arrhythmia     Family history of colonic polyps     GERD (gastroesophageal reflux disease)     History of adenomatous polyp of colon     History of colon polyps     Hyperlipidemia     Hypertension     MVP (mitral valve prolapse)     Pseudotumor cerebri     Type 2 diabetes mellitus      Past Surgical History:  Past Surgical History:   Procedure Laterality Date    CARDIAC CATHETERIZATION      CATARACT EXTRACTION Right 08/15/2021    CATARACT EXTRACTION Left 2021     SECTION      CHOLECYSTECTOMY      COLONOSCOPY  2015    One 3-4mm hyperplastic polyp in the rectum; Repeat 5 years    COLONOSCOPY  2007    Normal; Repeat 3-4 years    COLONOSCOPY  2004    One 2cm pedunculated erythematous adenomatous polyp in the sigmoid colon; One 6mm hyperplastic polyp in the rectum; Repeat 3 years    COLONOSCOPY N/A 2021    One 7mm tubular adenomatous polyp in the transverse colon; The examination was otherwise normal on direct and retroflexion views; Repeat 5 years    DILATATION AND CURETTAGE      ENDOSCOPY  2009    Normal endoscopy    ENDOSCOPY  2004    GERD    ENDOSCOPY N/A 2023    Procedure: ESOPHAGOGASTRODUODENOSCOPY WITH ANESTHESIA;  Surgeon: Kelly Kim MD;  Location: [facility] ENDOSCOPY;  Service: Gastroenterology;  Laterality: N/A;  preop; dysphagia  postop; HIatal hernia; schotski ring   pcp Yuni Woetlz     HERNIA REPAIR      HYSTERECTOMY      LAPAROSCOPIC LYSIS OF ADHESIONS      OOPHORECTOMY      OTHER SURGICAL HISTORY  2010    EUS-Dr. Hastings-[place]  EUS evaluation of the pancreas, bile duct, and ampulla of Vater-pancreatitis remains idiopathic     Social History:  reports that she has never smoked. She has never used smokeless tobacco. She reports that she does not drink alcohol and does not use drugs.    Family History: family history includes Colon polyps in her father; Heart disease in her mother; Prostate cancer in her father; Stomach cancer (age of onset: 48) in her paternal grandmother.      Allergies:  Allergies   Allergen Reactions    Tilactase GI Intolerance     Medications:  Prior to Admission medications    Medication Sig Start Date End Date Taking? Authorizing Provider   amLODIPine (NORVASC) 2.5 MG tablet Take 1 tablet by mouth 2 (Two) Times a Day. Take 1 tablet once daily for 1 week, then stop 10/17/23  Yes Yuni Sanchez APRN   Barberry-Oreg Grape-Goldenseal (BERBERINE COMPLEX PO) Take 1 capsule by mouth Daily.   Yes Ann Marie Padgett MD   Coenzyme Q10 (CoQ-10) 100 MG capsule Take 1 capsule by mouth Daily.   Yes Ann Marie Padgett MD   Fenugreek 500 MG capsule Take 1 capsule by mouth Daily.   Yes Ann Marie Padgett MD   losartan (COZAAR) 100 MG tablet Take 1 tablet by mouth once daily 10/10/23  Yes Yuni Sanchez APRN   magnesium oxide (MAG-OX) 400 MG tablet Take 1 tablet by mouth Daily.   Yes Ann Marie Padgett MD   metoprolol tartrate (LOPRESSOR) 50 MG tablet TAKE 1/2 (ONE-HALF) TABLET BY MOUTH IN THE MORNING AND 1/2 (ONE-HALF) IN THE EVENING  Patient taking differently: Take 0.5 tablets by mouth 2 (Two) Times a Day. 11/28/22  Yes Elaine Randhawa   omeprazole (priLOSEC) 40 MG capsule Take 1 capsule by mouth Daily. 1/5/23  Yes Genna Martinez APRN   Unable to find 1 each 1 (One) Time. Liver Cleanse   Gall bladder   Yes Ann Marie Padgett MD   vitamin B-12 (CYANOCOBALAMIN) 1000 MCG tablet Take 1 tablet by mouth Daily. [place]   Yes Ann Marie Padgett MD   vitamin D3 125 MCG (5000 UT) capsule capsule Take 1 capsule  "by mouth Daily.   Yes ProviderAnn Marie MD   metFORMIN (GLUCOPHAGE) 500 MG tablet Take 1 tablet by mouth 2 (Two) Times a Day With Meals.    Provider, MD Ann Marie       Objective     Vital Signs: /92 (BP Location: Left arm, Patient Position: Sitting, Cuff Size: Adult)   Pulse 75   Temp 97.9 °F (36.6 °C) (Temporal)   Ht 151.1 cm (59.49\")   Wt 71.4 kg (157 lb 6.4 oz)   SpO2 97%   BMI 31.27 kg/m²   Physical Exam  Vitals and nursing note reviewed.   Constitutional:       General: She is not in acute distress.     Appearance: She is obese. She is not ill-appearing or toxic-appearing.   HENT:      Head: Normocephalic and atraumatic.      Mouth/Throat:      Mouth: Mucous membranes are moist.      Pharynx: Oropharynx is clear.   Cardiovascular:      Rate and Rhythm: Normal rate and regular rhythm.      Pulses: Normal pulses.      Heart sounds: Normal heart sounds.   Pulmonary:      Effort: Pulmonary effort is normal.      Breath sounds: No wheezing, rhonchi or rales.   Abdominal:      General: Bowel sounds are normal. There is no distension.      Palpations: Abdomen is soft.      Tenderness: There is no abdominal tenderness.   Musculoskeletal:         General: No swelling or tenderness. Normal range of motion.      Cervical back: Normal range of motion and neck supple. No tenderness.   Skin:     General: Skin is warm and dry.      Findings: No erythema or rash.   Neurological:      General: No focal deficit present.      Mental Status: She is alert and oriented to person, place, and time.   Psychiatric:         Mood and Affect: Mood normal.         Behavior: Behavior normal.         Thought Content: Thought content normal.         Judgment: Judgment normal.       Results Reviewed:  POC Glycated Hemoglobin, Total (11/30/2023 13:36)     Assessment / Plan     Assessment/Plan:  Diagnoses and all orders for this visit:    1. Type 2 diabetes mellitus with hyperglycemia, without long-term current use of insulin " (Primary)  -     POC Glycated Hemoglobin, Total    2. Essential hypertension  -     Basic metabolic panel    3. Class 1 obesity due to excess calories with serious comorbidity and body mass index (BMI) of 31.0 to 31.9 in adult       Patient presents to the office today for 3-month follow-up regarding diabetes and hypertension management.  Her last hemoglobin A1c in August was 5.7.  The patient had done very well taking Ozempic from November to June in terms of weight loss and A1c reduction.  She was concerned as she was still having some abdominal pain and nausea despite trying to eat smaller portions so we did discontinue this today.  Since being off of Ozempic, the patient has been able to maintain her weight loss.  Weight is stable today at 157.  At her last office visit, the patient and I discussed trialing her off of metformin to see if she could maintain reduced A1c with diet and exercise alone.  The patient states that she did notice that her blood glucoses started running a little higher when she stopped the metformin so she has started taking this again.  She is taking 500 mg extended release daily.  Her fasting blood glucoses are running around 140s.  A1c is up slightly at 6.2.  Will continue current dosage of metformin for now but have asked the patient to call the office for any concerns or if her blood glucoses continue to run high.    Blood pressure slightly elevated in the office today as well at 148/92.  The patient states that she has been in quite a bit of pain following a recent dental procedure where she had to have a crown replaced so she thinks this may be contributory to her elevated blood pressures.  She states that her blood pressure runs lower at home.  She had asked to try to come off of one of her antihypertensives at her last office visit and we did wean amlodipine.  Unfortunately, she states that her blood pressures were more elevated off of the medications that she has put herself back  on this medication at 2.5 mg nightly.  We may need to titrate this up if her blood pressure still continues to run above goal.  Continue losartan 100 mg daily as well.  Will reassess a BMP today.    Return in about 3 months (around 2/29/2024) for Recheck. unless patient needs to be seen sooner or acute issues arise.    I have discussed the patient results/orders and and plan/recommendation with them at today's visit.      Yuni Sanchez, APRN   11/30/2023

## 2023-12-27 DIAGNOSIS — E11.65 TYPE 2 DIABETES MELLITUS WITH HYPERGLYCEMIA, WITHOUT LONG-TERM CURRENT USE OF INSULIN: Primary | ICD-10-CM

## 2023-12-27 RX ORDER — METFORMIN HYDROCHLORIDE 500 MG/1
500 TABLET, EXTENDED RELEASE ORAL
Qty: 90 TABLET | Refills: 3 | Status: SHIPPED | OUTPATIENT
Start: 2023-12-27

## 2024-01-05 LAB
NCCN CRITERIA FLAG: NORMAL
TYRER CUZICK SCORE: 6.1

## 2024-01-08 ENCOUNTER — HOSPITAL ENCOUNTER (OUTPATIENT)
Dept: MAMMOGRAPHY | Facility: HOSPITAL | Age: 74
Discharge: HOME | End: 2024-01-08
Payer: MEDICARE

## 2024-01-08 ENCOUNTER — APPOINTMENT (OUTPATIENT)
Dept: OTHER | Facility: HOSPITAL | Age: 74
End: 2024-01-08
Payer: MEDICARE

## 2024-01-08 ENCOUNTER — HOSPITAL ENCOUNTER (OUTPATIENT)
Dept: BONE DENSITY | Facility: HOSPITAL | Age: 74
Discharge: HOME | End: 2024-01-08
Payer: MEDICARE

## 2024-01-08 DIAGNOSIS — Z78.0 POSTMENOPAUSAL: ICD-10-CM

## 2024-01-08 DIAGNOSIS — Z12.31 ENCOUNTER FOR SCREENING MAMMOGRAM FOR MALIGNANT NEOPLASM OF BREAST: ICD-10-CM

## 2024-01-08 PROCEDURE — 77063 BREAST TOMOSYNTHESIS BI: CPT

## 2024-01-08 PROCEDURE — 77067 SCR MAMMO BI INCL CAD: CPT

## 2024-01-08 PROCEDURE — 77080 DXA BONE DENSITY AXIAL: CPT

## 2024-01-09 NOTE — PROGRESS NOTES
DEXA scan shows osteoporosis, severe weakening of the bone which does increase risk for fracture.  If the patient would like to discuss options for treatment, please let me know and I will give her a call.  At the very least she should be taking a vitamin D and calcium supplement.

## 2024-01-20 DIAGNOSIS — I10 ESSENTIAL HYPERTENSION: ICD-10-CM

## 2024-01-22 RX ORDER — AMLODIPINE BESYLATE 2.5 MG/1
2.5 TABLET ORAL 2 TIMES DAILY
Qty: 180 TABLET | Refills: 0 | OUTPATIENT
Start: 2024-01-22

## 2024-01-30 DIAGNOSIS — I10 ESSENTIAL HYPERTENSION: ICD-10-CM

## 2024-01-30 RX ORDER — METOPROLOL TARTRATE 50 MG/1
TABLET, FILM COATED ORAL
Qty: 90 TABLET | Refills: 0 | Status: SHIPPED | OUTPATIENT
Start: 2024-01-30

## 2024-02-29 ENCOUNTER — OFFICE VISIT (OUTPATIENT)
Dept: INTERNAL MEDICINE | Facility: CLINIC | Age: 74
End: 2024-02-29
Payer: MEDICARE

## 2024-02-29 VITALS
SYSTOLIC BLOOD PRESSURE: 158 MMHG | HEART RATE: 61 BPM | BODY MASS INDEX: 30.63 KG/M2 | DIASTOLIC BLOOD PRESSURE: 88 MMHG | OXYGEN SATURATION: 96 % | HEIGHT: 60 IN | TEMPERATURE: 97.3 F | WEIGHT: 156 LBS

## 2024-02-29 DIAGNOSIS — G44.52 NEW PERSISTENT DAILY HEADACHE: ICD-10-CM

## 2024-02-29 DIAGNOSIS — R30.0 DYSURIA: ICD-10-CM

## 2024-02-29 DIAGNOSIS — I10 ESSENTIAL HYPERTENSION: ICD-10-CM

## 2024-02-29 DIAGNOSIS — E11.65 TYPE 2 DIABETES MELLITUS WITH HYPERGLYCEMIA, WITHOUT LONG-TERM CURRENT USE OF INSULIN: Primary | ICD-10-CM

## 2024-02-29 LAB
BILIRUB BLD-MCNC: NEGATIVE MG/DL
CLARITY, POC: CLEAR
COLOR UR: YELLOW
GLUCOSE UR STRIP-MCNC: NEGATIVE MG/DL
HBA1C MFR BLD: 6.4 % (ref 4.5–5.7)
KETONES UR QL: NEGATIVE
LEUKOCYTE EST, POC: NEGATIVE
NITRITE UR-MCNC: NEGATIVE MG/ML
PH UR: 7 [PH] (ref 5–8)
PROT UR STRIP-MCNC: NEGATIVE MG/DL
RBC # UR STRIP: NEGATIVE /UL
SP GR UR: 1.02 (ref 1–1.03)
UROBILINOGEN UR QL: NORMAL

## 2024-02-29 RX ORDER — METFORMIN HYDROCHLORIDE 500 MG/1
500 TABLET, EXTENDED RELEASE ORAL 2 TIMES DAILY WITH MEALS
Qty: 180 TABLET | Refills: 3 | Status: SHIPPED | OUTPATIENT
Start: 2024-02-29

## 2024-03-01 LAB
BASOPHILS # BLD AUTO: 0.02 10*3/MM3 (ref 0–0.2)
BASOPHILS NFR BLD AUTO: 0.4 % (ref 0–1.5)
BUN SERPL-MCNC: 26 MG/DL (ref 8–23)
BUN/CREAT SERPL: 27.1 (ref 7–25)
CALCIUM SERPL-MCNC: 9.8 MG/DL (ref 8.6–10.5)
CHLORIDE SERPL-SCNC: 101 MMOL/L (ref 98–107)
CO2 SERPL-SCNC: 27 MMOL/L (ref 22–29)
CREAT SERPL-MCNC: 0.96 MG/DL (ref 0.57–1)
CRP SERPL-MCNC: 0.37 MG/DL (ref 0–0.5)
EGFRCR SERPLBLD CKD-EPI 2021: 63 ML/MIN/1.73
EOSINOPHIL # BLD AUTO: 0.2 10*3/MM3 (ref 0–0.4)
EOSINOPHIL NFR BLD AUTO: 3.9 % (ref 0.3–6.2)
ERYTHROCYTE [DISTWIDTH] IN BLOOD BY AUTOMATED COUNT: 13.5 % (ref 12.3–15.4)
ERYTHROCYTE [SEDIMENTATION RATE] IN BLOOD BY WESTERGREN METHOD: 3 MM/HR (ref 0–30)
GLUCOSE SERPL-MCNC: 104 MG/DL (ref 65–99)
HCT VFR BLD AUTO: 45.1 % (ref 34–46.6)
HGB BLD-MCNC: 15.2 G/DL (ref 12–15.9)
IMM GRANULOCYTES # BLD AUTO: 0.01 10*3/MM3 (ref 0–0.05)
IMM GRANULOCYTES NFR BLD AUTO: 0.2 % (ref 0–0.5)
LYMPHOCYTES # BLD AUTO: 1.64 10*3/MM3 (ref 0.7–3.1)
LYMPHOCYTES NFR BLD AUTO: 32 % (ref 19.6–45.3)
MCH RBC QN AUTO: 30.8 PG (ref 26.6–33)
MCHC RBC AUTO-ENTMCNC: 33.7 G/DL (ref 31.5–35.7)
MCV RBC AUTO: 91.3 FL (ref 79–97)
MONOCYTES # BLD AUTO: 0.46 10*3/MM3 (ref 0.1–0.9)
MONOCYTES NFR BLD AUTO: 9 % (ref 5–12)
NEUTROPHILS # BLD AUTO: 2.79 10*3/MM3 (ref 1.7–7)
NEUTROPHILS NFR BLD AUTO: 54.5 % (ref 42.7–76)
NRBC BLD AUTO-RTO: 0 /100 WBC (ref 0–0.2)
PLATELET # BLD AUTO: 264 10*3/MM3 (ref 140–450)
POTASSIUM SERPL-SCNC: 4.3 MMOL/L (ref 3.5–5.2)
RBC # BLD AUTO: 4.94 10*6/MM3 (ref 3.77–5.28)
SODIUM SERPL-SCNC: 140 MMOL/L (ref 136–145)
WBC # BLD AUTO: 5.12 10*3/MM3 (ref 3.4–10.8)

## 2024-03-01 NOTE — PROGRESS NOTES
Results discussed with patient via telephone. Samples for Nurtec left up front for patient to trial for headache. TO ER BED 6

## 2024-03-01 NOTE — PROGRESS NOTES
Subjective     Chief Complaint:  Headaches    HPI:  Patient presents to the office today for a 3-month follow-up regarding hypertension and diabetes management.  She does feel as though she had some type of viral illness a couple of weeks ago and has had some lingering headache and fatigue since that time.  Please see assessment and plan below.    Patient's PMR from outside medical facility reviewed and noted.    Past Medical History:   Past Medical History:   Diagnosis Date    Arrhythmia     Family history of colonic polyps     GERD (gastroesophageal reflux disease)     History of adenomatous polyp of colon     History of colon polyps     Hyperlipidemia     Hypertension     MVP (mitral valve prolapse)     Pseudotumor cerebri     Type 2 diabetes mellitus      Past Surgical History:  Past Surgical History:   Procedure Laterality Date    CARDIAC CATHETERIZATION      CATARACT EXTRACTION Right 08/15/2021    CATARACT EXTRACTION Left 2021     SECTION      CHOLECYSTECTOMY      COLONOSCOPY  2015    One 3-4mm hyperplastic polyp in the rectum; Repeat 5 years    COLONOSCOPY  2007    Normal; Repeat 3-4 years    COLONOSCOPY  2004    One 2cm pedunculated erythematous adenomatous polyp in the sigmoid colon; One 6mm hyperplastic polyp in the rectum; Repeat 3 years    COLONOSCOPY N/A 2021    One 7mm tubular adenomatous polyp in the transverse colon; The examination was otherwise normal on direct and retroflexion views; Repeat 5 years    DILATATION AND CURETTAGE      ENDOSCOPY  2009    Normal endoscopy    ENDOSCOPY  2004    GERD    ENDOSCOPY N/A 2023    Procedure: ESOPHAGOGASTRODUODENOSCOPY WITH ANESTHESIA;  Surgeon: Kelly Kim MD;  Location: [facility] ENDOSCOPY;  Service: Gastroenterology;  Laterality: N/A;  preop; dysphagia  postop; HIatal hernia; schotski ring   pcp Yuni Pierson     HERNIA REPAIR      HYSTERECTOMY      LAPAROSCOPIC LYSIS OF ADHESIONS       OOPHORECTOMY      OTHER SURGICAL HISTORY  06/14/2010    EUS-Dr. Hastings-Normal EUS evaluation of the pancreas, bile duct, and ampulla of Vater-pancreatitis remains idiopathic     Social History:  reports that she has never smoked. She has never used smokeless tobacco. She reports that she does not drink alcohol and does not use drugs.    Family History: family history includes Breast cancer in her maternal grandmother; Colon polyps in her father; Heart disease in her mother; Prostate cancer in her father; Stomach cancer (age of onset: 48) in her paternal grandmother.      Allergies:  Allergies   Allergen Reactions    Tilactase GI Intolerance     Medications:  Prior to Admission medications    Medication Sig Start Date End Date Taking? Authorizing Provider   Acetaminophen (TYLENOL PO) Take  by mouth As Needed.   Yes ProviderAnn Marie MD   amLODIPine (NORVASC) 2.5 MG tablet Take 1 tablet by mouth 2 (Two) Times a Day. Take 1 tablet once daily for 1 week, then stop 10/17/23  Yes Yuni Sanchez APRN   Barberry-Oreg Grape-Goldenseal (BERBERINE COMPLEX PO) Take 1 capsule by mouth Daily.   Yes ProviderAnn Marie MD   Coenzyme Q10 (CoQ-10) 100 MG capsule Take 1 capsule by mouth Daily.   Yes ProviderAnn Marie MD   Fenugreek 500 MG capsule Take 1 capsule by mouth Daily.   Yes ProviderAnn Marie MD   losartan (COZAAR) 100 MG tablet Take 1 tablet by mouth once daily 10/10/23  Yes Yuni Sanchez APRN   magnesium oxide (MAG-OX) 400 MG tablet Take 1 tablet by mouth Daily.   Yes ProviderAnn Marie MD   metFORMIN ER (GLUCOPHAGE-XR) 500 MG 24 hr tablet Take 1 tablet by mouth Daily With Breakfast. 12/27/23  Yes Antonia Sepulveda APRN   metoprolol tartrate (LOPRESSOR) 50 MG tablet TAKE 1/2 (ONE-HALF) TABLET BY MOUTH IN THE MORNING AND 1/2 (ONE-HALF) IN THE EVENING 1/30/24  Yes Yuni Sanchez APRN   omeprazole (priLOSEC) 40 MG capsule Take 1 capsule by mouth Daily. 1/5/23  Yes Genna Martinez APRN  "  Unable to find 1 each 1 (One) Time. Liver Cleanse   Gall bladder   Yes ProviderAnn Marie MD   vitamin B-12 (CYANOCOBALAMIN) 1000 MCG tablet Take 1 tablet by mouth Daily. [place]   Yes Ann Marie Padgett MD   vitamin D3 125 MCG (5000 UT) capsule capsule Take 1 capsule by mouth Daily.   Yes Provider, MD Ann Marie       Objective     Vital Signs: /88 (BP Location: Left arm, Patient Position: Sitting, Cuff Size: Adult)   Pulse 61   Temp 97.3 °F (36.3 °C) (Infrared)   Ht 152.4 cm (60\")   Wt 70.8 kg (156 lb)   SpO2 96%   BMI 30.47 kg/m²   Physical Exam  Vitals and nursing note reviewed.   Constitutional:       General: She is not in acute distress.     Appearance: She is obese. She is not ill-appearing or toxic-appearing.   HENT:      Head: Normocephalic and atraumatic.      Comments: Facial flushing noted     Right Ear: Tympanic membrane normal.      Left Ear: Tympanic membrane normal.      Nose:      Right Sinus: No maxillary sinus tenderness or frontal sinus tenderness.      Left Sinus: No maxillary sinus tenderness or frontal sinus tenderness.      Mouth/Throat:      Mouth: Mucous membranes are moist.      Pharynx: Oropharynx is clear.   Cardiovascular:      Rate and Rhythm: Normal rate and regular rhythm.      Pulses: Normal pulses.      Heart sounds: Normal heart sounds.   Pulmonary:      Effort: Pulmonary effort is normal.      Breath sounds: No wheezing, rhonchi or rales.   Abdominal:      General: Bowel sounds are normal. There is no distension.      Palpations: Abdomen is soft.      Tenderness: There is no abdominal tenderness.   Musculoskeletal:         General: No swelling or tenderness. Normal range of motion.      Cervical back: Normal range of motion and neck supple. No tenderness.   Skin:     General: Skin is warm and dry.      Findings: No erythema or rash.   Neurological:      General: No focal deficit present.      Mental Status: She is alert and oriented to person, place, and time. "   Psychiatric:         Mood and Affect: Mood normal.         Behavior: Behavior normal.         Thought Content: Thought content normal.         Judgment: Judgment normal.       Results Reviewed:  MRI BRAIN W WO CONTRAST (07/09/2019 18:15 EDT)   POC Urinalysis Dipstick, Multipro (02/29/2024 15:01)   POC Glycated Hemoglobin, Total (02/29/2024 13:26)   Assessment / Plan     Assessment/Plan:  Diagnoses and all orders for this visit:    1. Type 2 diabetes mellitus with hyperglycemia, without long-term current use of insulin (Primary)  -     POC Glycated Hemoglobin, Total  -     metFORMIN ER (GLUCOPHAGE-XR) 500 MG 24 hr tablet; Take 1 tablet by mouth 2 (Two) Times a Day With Meals.  Dispense: 180 tablet; Refill: 3    2. Essential hypertension  -     Basic metabolic panel    3. Dysuria  -     POC Urinalysis Dipstick, Multipro    4. New persistent daily headache  -     CBC & Differential  -     Sedimentation rate, automated  -     C-reactive protein       Patient presents to the office today for a 3 month follow-up regarding diabetes and hypertension management. Blood pressure is elevated in the office today at 158/88. She has not been keeping track of this at home on a regular basis.  Typically her blood pressure readings in the office are within goal.  I have asked her to keep track of her blood pressure on a daily basis for the next week, and I will call her next week to discuss these readings before making any adjustments to her medications.  She believes that her blood pressure may be elevated secondary to a daily headache.  She indicates having upper respiratory symptoms 2 weeks ago with extreme fatigue, so she thought she may have had COVID or the flu.  Since then, she has seemed to have a headache.  She does tell me that this is not different from headaches that she has had in the past.  This is described as an ice pick/stabbing sensation to the right temple.  Headaches will last for minutes to hours at a time.   She tells me that this will sometimes occur for numerous days in a row, then can disappear for months or even years at a time.  This is typically always located on the right side but does cause a radiation of pain on her whole head.  She denies any vision changes, sensitivity to light or sound, numbness/tingling.  She will sometimes have nausea but no vomiting with the headaches.  Tylenol typically does not help.  Headaches are not positional.  She denies any unilateral tearing or rhinorrhea, but on exam today the right side of her face does feel a little warmer compared to the left.    Patient has had some residual sinus drainage and feelings of ear fullness.  She has been taking Coricidin for this.  She has felt somewhat lightheaded as well.  She does not have any sinus tenderness noted on exam and tympanic membranes appear normal with no effusion.  I believe she may be describing cluster headaches based on the description, timing and duration.  She is not particularly tender on the right temple but will need to rule out temporal arteritis given her description of headache.  Will check inflammatory markers and if normal, would plan to treat for cluster headache with trial of triptans if blood pressure will allow.  Will also check BMP and CBC today.  Patient did have MRI of the brain in 2019 which showed bilateral choroid plexus cysts with the right side being slightly larger.  May consider repeating imaging if headache persists.    The patient had done very well taking Ozempic from November 2022 through June 2023 in terms of weight loss and A1c reduction.  She was concerned as she was still having some abdominal pain and nausea despite trying to eat smaller portions so we did discontinue this in June.  Patient has been able to maintain weight loss off of Ozempic.  Weight is stable today at 156.  We did discuss trialing her off of metformin to see if she could maintain reduced A1c with diet and exercise alone.  The  patient did restart metformin of her own accord between August and November as her glucoses were running a little higher.  As hemoglobin A1c was 6.2 and today is up to 6.4.  Patient is agreeable to increase metformin to twice daily, continue 500 mg dosing.  Reassess A1c in 3 months.    Patient does complain of some dysuria however her urinalysis in the office today is unrevealing.    Return in about 3 months (around 5/29/2024) for Medicare Wellness. unless patient needs to be seen sooner or acute issues arise.    I have discussed the patient results/orders and and plan/recommendation with them at today's visit.      Yuni Sanchez, APRN   02/29/2024

## 2024-03-14 DIAGNOSIS — G89.29 CHRONIC HEADACHE WITH NEW FEATURES: ICD-10-CM

## 2024-03-14 DIAGNOSIS — G93.0 CHOROID PLEXUS CYST: Primary | ICD-10-CM

## 2024-03-14 DIAGNOSIS — R51.9 CHRONIC HEADACHE WITH NEW FEATURES: ICD-10-CM

## 2024-03-15 DIAGNOSIS — I10 ESSENTIAL HYPERTENSION: ICD-10-CM

## 2024-03-15 RX ORDER — AMLODIPINE BESYLATE 2.5 MG/1
2.5 TABLET ORAL 2 TIMES DAILY
Qty: 180 TABLET | Refills: 0 | OUTPATIENT
Start: 2024-03-15

## 2024-03-22 DIAGNOSIS — I10 ESSENTIAL HYPERTENSION: ICD-10-CM

## 2024-03-22 RX ORDER — AMLODIPINE BESYLATE 2.5 MG/1
2.5 TABLET ORAL 2 TIMES DAILY
Qty: 180 TABLET | Refills: 0 | OUTPATIENT
Start: 2024-03-22

## 2024-03-22 RX ORDER — METOPROLOL TARTRATE 50 MG/1
TABLET, FILM COATED ORAL
Qty: 90 TABLET | Refills: 0 | Status: SHIPPED | OUTPATIENT
Start: 2024-03-22

## 2024-03-22 RX ORDER — AMLODIPINE BESYLATE 2.5 MG/1
2.5 TABLET ORAL 2 TIMES DAILY
Qty: 90 TABLET | Refills: 3
Start: 2024-03-22

## 2024-03-22 NOTE — TELEPHONE ENCOUNTER
Caller: Pauline Molina    Relationship: SELF    Requested Prescriptions:   Requested Prescriptions     Pending Prescriptions Disp Refills    amLODIPine (NORVASC) 2.5 MG tablet       Sig: Take 1 tablet by mouth 2 (Two) Times a Day. Take 1 tablet once daily for 1 week, then stop    metoprolol tartrate (LOPRESSOR) 50 MG tablet 90 tablet 0     Sig: TAKE 1/2 (ONE-HALF) TABLET BY MOUTH IN THE MORNING AND 1/2 (ONE-HALF) IN THE EVENING        Pharmacy where request should be sent: [address] [phone] [facility] [phone]      Last office visit with prescribing clinician: 2/29/2024   Last telemedicine visit with prescribing clinician: Visit date not found   Next office visit with prescribing clinician: 3/22/2024         Does the patient have less than a 3 day supply:  [x] Yes  [] No    Would you like a call back once the refill request has been completed: [] Yes [] No    If the office needs to give you a call back, can they leave a voicemail: [] Yes [] No    Shankar Jenkins Rep   03/22/24 11:51 CDT

## 2024-03-28 DIAGNOSIS — I10 ESSENTIAL HYPERTENSION: ICD-10-CM

## 2024-03-28 RX ORDER — AMLODIPINE BESYLATE 2.5 MG/1
2.5 TABLET ORAL 2 TIMES DAILY
Qty: 180 TABLET | Refills: 3 | Status: SHIPPED | OUTPATIENT
Start: 2024-03-28

## 2024-03-28 RX ORDER — ALPRAZOLAM 0.5 MG/1
TABLET ORAL
Qty: 2 TABLET | Refills: 0 | Status: SHIPPED | OUTPATIENT
Start: 2024-03-28

## 2024-04-05 ENCOUNTER — HOSPITAL ENCOUNTER (OUTPATIENT)
Dept: MRI IMAGING | Facility: HOSPITAL | Age: 74
Discharge: HOME | End: 2024-04-05
Payer: MEDICARE

## 2024-04-05 DIAGNOSIS — R51.9 CHRONIC HEADACHE WITH NEW FEATURES: ICD-10-CM

## 2024-04-05 DIAGNOSIS — G89.29 CHRONIC HEADACHE WITH NEW FEATURES: ICD-10-CM

## 2024-04-05 DIAGNOSIS — G93.0 CHOROID PLEXUS CYST: ICD-10-CM

## 2024-04-05 LAB — CREAT BLDA-MCNC: 0.8 MG/DL (ref 0.6–1.3)

## 2024-04-05 PROCEDURE — 82565 ASSAY OF CREATININE: CPT

## 2024-04-05 PROCEDURE — A9577 INJ MULTIHANCE: HCPCS | Performed by: NURSE PRACTITIONER

## 2024-04-05 PROCEDURE — 70553 MRI BRAIN STEM W/O & W/DYE: CPT

## 2024-04-05 PROCEDURE — 0 GADOBENATE DIMEGLUMINE 529 MG/ML SOLUTION: Performed by: NURSE PRACTITIONER

## 2024-04-05 RX ADMIN — GADOBENATE DIMEGLUMINE 15 ML: 529 INJECTION, SOLUTION INTRAVENOUS at 14:47

## 2024-04-09 RX ORDER — PROPRANOLOL HYDROCHLORIDE 40 MG/1
40 TABLET ORAL 2 TIMES DAILY
Qty: 180 TABLET | Refills: 1 | Status: SHIPPED | OUTPATIENT
Start: 2024-04-09

## 2024-05-28 ENCOUNTER — TELEPHONE (OUTPATIENT)
Dept: INTERNAL MEDICINE | Facility: CLINIC | Age: 74
End: 2024-05-28

## 2024-05-28 DIAGNOSIS — E03.9 ACQUIRED HYPOTHYROIDISM: ICD-10-CM

## 2024-05-28 DIAGNOSIS — I10 ESSENTIAL HYPERTENSION: ICD-10-CM

## 2024-05-28 DIAGNOSIS — E11.65 TYPE 2 DIABETES MELLITUS WITH HYPERGLYCEMIA, WITHOUT LONG-TERM CURRENT USE OF INSULIN: ICD-10-CM

## 2024-05-28 NOTE — TELEPHONE ENCOUNTER
Caller: Pauline Molina    Relationship to patient: Self    Best call back number: [phone]  [phone]    Chief complaint: NEEDS LABS FOR UPCOMING APPT    Type of visit: LABS    Requested date: ANY        Additional notes:

## 2024-05-28 NOTE — TELEPHONE ENCOUNTER
Left detailed message stating lab orders have been pended to the provider.   Patient can come in at least 2-3 days prior to her appointment and complete these fasting labs.   Notified patient our lab opens at 8am - 12pm, resumes back at 1pm - 4:30pm.   Advised patient to call the office if there are any questions or concerns.

## 2024-06-06 ENCOUNTER — OFFICE VISIT (OUTPATIENT)
Dept: INTERNAL MEDICINE | Facility: CLINIC | Age: 74
End: 2024-06-06
Payer: MEDICARE

## 2024-06-06 VITALS
BODY MASS INDEX: 32.67 KG/M2 | HEART RATE: 64 BPM | TEMPERATURE: 97.6 F | HEIGHT: 60 IN | SYSTOLIC BLOOD PRESSURE: 140 MMHG | WEIGHT: 166.4 LBS | OXYGEN SATURATION: 95 % | DIASTOLIC BLOOD PRESSURE: 80 MMHG

## 2024-06-06 DIAGNOSIS — G89.29 CHRONIC HEADACHE WITH NEW FEATURES: ICD-10-CM

## 2024-06-06 DIAGNOSIS — E78.1 HIGH TRIGLYCERIDES: ICD-10-CM

## 2024-06-06 DIAGNOSIS — T88.7XXA MEDICATION SIDE EFFECT: ICD-10-CM

## 2024-06-06 DIAGNOSIS — Z12.31 BREAST CANCER SCREENING BY MAMMOGRAM: ICD-10-CM

## 2024-06-06 DIAGNOSIS — R51.9 CHRONIC HEADACHE WITH NEW FEATURES: ICD-10-CM

## 2024-06-06 DIAGNOSIS — E03.9 ACQUIRED HYPOTHYROIDISM: ICD-10-CM

## 2024-06-06 DIAGNOSIS — I10 ESSENTIAL HYPERTENSION: ICD-10-CM

## 2024-06-06 DIAGNOSIS — F41.0 PANIC ATTACK: ICD-10-CM

## 2024-06-06 DIAGNOSIS — Z00.00 ENCOUNTER FOR SUBSEQUENT ANNUAL WELLNESS VISIT (AWV) IN MEDICARE PATIENT: Primary | ICD-10-CM

## 2024-06-06 DIAGNOSIS — E11.65 TYPE 2 DIABETES MELLITUS WITH HYPERGLYCEMIA, WITHOUT LONG-TERM CURRENT USE OF INSULIN: ICD-10-CM

## 2024-06-06 NOTE — PROGRESS NOTES
The ABCs of the Annual Wellness Visit  Subsequent Medicare Wellness Visit    Subjective    Pauline Molina is a 74 y.o. female who presents for a Subsequent Medicare Wellness Visit.    The following portions of the patient's history were reviewed and   updated as appropriate: allergies, current medications, past family history, past medical history, past social history, past surgical history, and problem list.    Compared to one year ago, the patient feels her physical   health is better.    Compared to one year ago, the patient feels her mental   health is worse.    Recent Hospitalizations:  She was not admitted to the hospital during the last year.       Current Medical Providers:  Patient Care Team:  Yuni Sanchez APRN as PCP - General (Nurse Practitioner)    Outpatient Medications Prior to Visit   Medication Sig Dispense Refill    Acetaminophen (TYLENOL PO) Take  by mouth As Needed.      amLODIPine (NORVASC) 2.5 MG tablet Take 1 tablet by mouth 2 (Two) Times a Day. 180 tablet 3    Barberry-Oreg Grape-Goldenseal (BERBERINE COMPLEX PO) Take 1 capsule by mouth Daily.      Coenzyme Q10 (CoQ-10) 100 MG capsule Take 1 capsule by mouth Daily.      losartan (COZAAR) 100 MG tablet Take 1 tablet by mouth once daily 90 tablet 3    magnesium oxide (MAG-OX) 400 MG tablet Take 1 tablet by mouth Daily.      omeprazole (priLOSEC) 40 MG capsule Take 1 capsule by mouth Daily. 30 capsule 11    vitamin B-12 (CYANOCOBALAMIN) 1000 MCG tablet Take 1 tablet by mouth Daily. Spray      vitamin D3 125 MCG (5000 UT) capsule capsule Take 1 capsule by mouth Daily.      ALPRAZolam (Xanax) 0.5 MG tablet Take 1 tablet 1 hour prior to MRI. May take another tablet at the time of MRI if needed. 2 tablet 0    metFORMIN ER (GLUCOPHAGE-XR) 500 MG 24 hr tablet Take 1 tablet by mouth 2 (Two) Times a Day With Meals. 180 tablet 3    propranolol (INDERAL) 40 MG tablet Take 1 tablet by mouth 2 (Two) Times a Day. 180 tablet 1    Fenugreek 500 MG  capsule Take 1 capsule by mouth Daily. (Patient not taking: Reported on 6/6/2024)      Unable to find 1 each 1 (One) Time. Liver Cleanse   Gall bladder (Patient not taking: Reported on 6/6/2024)       No facility-administered medications prior to visit.       No opioid medication identified on active medication list. I have reviewed chart for other potential  high risk medication/s and harmful drug interactions in the elderly.        Aspirin is not on active medication list.  Aspirin use is not indicated based on review of current medical condition/s. Risk of harm outweighs potential benefits.  .    Patient Active Problem List   Diagnosis    Abnormal nuclear cardiac imaging test    Arnold-Chiari deformity    Allergic rhinitis, mild    At low risk for fall    Type 2 diabetes mellitus with hyperglycemia, without long-term current use of insulin    Essential hypertension    Fatty liver disease, nonalcoholic    Fibrocystic breast    Headache    High triglycerides    History of colon polyps    Mitral valve prolapse    Multiple joint pain    Palpitation    Negative depression screening    Panic attack    Postmenopausal status    History of adenomatous polyp of colon    Family history of polyps in the colon    Chest pain    Degeneration of intervertebral disc    Encounter for screening mammogram for malignant neoplasm of breast    Muscle pain    Pseudotumor cerebri    Right leg pain    Acquired hypothyroidism    Esophageal dysphagia    Elevated LFTs    Gastroesophageal reflux disease without esophagitis    Nausea    Class 1 obesity due to excess calories with serious comorbidity and body mass index (BMI) of 31.0 to 31.9 in adult     Advance Care Planning   Advance Care Planning     Advance Directive is on file.  ACP discussion was held with the patient during this visit. Patient has an advance directive in EMR which is still valid.      Objective    Vitals:    06/06/24 1302   BP: 140/80   BP Location: Left arm   Patient  "Position: Sitting   Cuff Size: Adult   Pulse: 64   Temp: 97.6 °F (36.4 °C)   TempSrc: Temporal   SpO2: 95%   Weight: 75.5 kg (166 lb 6.4 oz)   Height: 152.4 cm (60\")   PainSc: 0-No pain     Estimated body mass index is 32.5 kg/m² as calculated from the following:    Height as of this encounter: 152.4 cm (60\").    Weight as of this encounter: 75.5 kg (166 lb 6.4 oz).    BMI is >= 30 and <35. (Class 1 Obesity). The following options were offered after discussion;: exercise counseling/recommendations and nutrition counseling/recommendations      Does the patient have evidence of cognitive impairment? No    Lab Results   Component Value Date    CHLPL 234 (H) 2024    TRIG 585 (H) 2024    HDL 32 (L) 2024     (H) 2024    VLDL 100 (H) 2024    HGBA1C 6.70 (H) 2024        HEALTH RISK ASSESSMENT    Smoking Status:  Social History     Tobacco Use   Smoking Status Never   Smokeless Tobacco Never     Alcohol Consumption:  Social History     Substance and Sexual Activity   Alcohol Use Never     Fall Risk Screen:    LEONARDO Fall Risk Assessment was completed, and patient is at LOW risk for falls.Assessment completed on:2024    Depression Screenin/6/2024     1:11 PM   PHQ-2/PHQ-9 Depression Screening   Little Interest or Pleasure in Doing Things 3-->nearly every day   Feeling Down, Depressed or Hopeless 0-->not at all   Trouble Falling or Staying Asleep, or Sleeping Too Much 3-->nearly every day   Feeling Tired or Having Little Energy 3-->nearly every day   Feeling Bad about Yourself - or that You are a Failure or Have Let Yourself or Your Family Down 0-->not at all   Trouble Concentrating on Things, Such as Reading the Newspaper or Watching Television 3-->nearly every day   Moving or Speaking So Slowly that Other People Could Have Noticed? Or the Opposite - Being So Fidgety 0-->not at all   Thoughts that You Would be Better Off Dead or of Hurting Yourself in Some Way 0-->not at " all   PHQ-9: Brief Depression Severity Measure Score 12   If You Checked Off Any Problems, How Difficult Have These Problems Made It For You to Do Your Work, Take Care of Things at Home, or Get Along with Other People? somewhat difficult       Health Habits and Functional and Cognitive Screenin/6/2024     1:10 PM   Functional & Cognitive Status   Do you have difficulty preparing food and eating? No   Do you have difficulty bathing yourself, getting dressed or grooming yourself? No   Do you have difficulty using the toilet? No   Do you have difficulty moving around from place to place? No   Do you have trouble with steps or getting out of a bed or a chair? No   Current Diet Well Balanced Diet   Dental Exam Up to date   Eye Exam Up to date   Exercise (times per week) 0 times per week   Current Exercises Include No Regular Exercise   Do you need help using the phone?  No   Are you deaf or do you have serious difficulty hearing?  No   Do you need help to go to places out of walking distance? No   Do you need help shopping? No   Do you need help preparing meals?  No   Do you need help with housework?  No   Do you need help with laundry? No   Do you need help taking your medications? No   Do you need help managing money? No   Do you ever drive or ride in a car without wearing a seat belt? No   Have you felt unusual stress, anger or loneliness in the last month? Yes   Who do you live with? Spouse   If you need help, do you have trouble finding someone available to you? No   Have you been bothered in the last four weeks by sexual problems? No   Do you have difficulty concentrating, remembering or making decisions? Yes       Age-appropriate Screening Schedule:  Refer to the list below for future screening recommendations based on patient's age, sex and/or medical conditions. Orders for these recommended tests are listed in the plan section. The patient has been provided with a written plan.    Health Maintenance    Topic Date Due    Pneumococcal Vaccine 65+ (1 of 2 - PCV) Never done    TDAP/TD VACCINES (1 - Tdap) Never done    ZOSTER VACCINE (1 of 2) Never done    RSV Vaccine - Adults (1 - 1-dose 60+ series) Never done    COVID-19 Vaccine (1 - 2023-24 season) Never done    DIABETIC FOOT EXAM  05/30/2024    BMI FOLLOWUP  05/30/2024    INFLUENZA VACCINE  08/01/2024    HEMOGLOBIN A1C  12/03/2024    DIABETIC EYE EXAM  04/25/2025    LIPID PANEL  06/03/2025    URINE MICROALBUMIN  06/03/2025    ANNUAL WELLNESS VISIT  06/06/2025    MAMMOGRAM  01/08/2026    DXA SCAN  01/08/2026    COLORECTAL CANCER SCREENING  06/18/2026    HEPATITIS C SCREENING  Completed                  CMS Preventative Services Quick Reference  Risk Factors Identified During Encounter  Immunizations Discussed/Encouraged: Tdap, Prevnar 20 (Pneumococcal 20-valent conjugate), Shingrix, and RSV (Respiratory Syncytial Virus)  Inactivity/Sedentary: Patient was advised to exercise at least 150 minutes a week per CDC recommendations.  Dental Screening Recommended  Vision Screening Recommended  The above risks/problems have been discussed with the patient.  Pertinent information has been shared with the patient in the After Visit Summary.  An After Visit Summary and PPPS were made available to the patient.    Follow Up:   Next Medicare Wellness visit to be scheduled in 1 year.       Additional E&M Note during same encounter follows:  Patient has multiple medical problems which are significant and separately identifiable that require additional work above and beyond the Medicare Wellness Visit.      Chief Complaint  Medicare Wellness-subsequent (Labs in chart//Pneumo vac//Patient wants to talk about Propranolol, feels like it working but feels like she is now having swelling in her legs and feet.  Having some anxiety and panic attacks since starting the medication.)    Subjective        HPI  Pauline Molina is also being seen today for potential adverse reaction to  "medication.  When I last saw the patient in February, she was complaining of difficulty with a headache following a respiratory illness in January.  This was some headaches that she had had in the past but she did have an intermittently occurring stabbing sensation to the right temple.  We have performed MRI of the brain which was normal.  I had placed her on propranolol in place of metoprolol as I was hopeful this may help with her headaches.  I had also given her samples of Nurtec which she states seem to affect her vision and made her feel \"out of it\".  After starting propranolol, she did seem to have some improvement both in blood pressure and headaches but now unfortunately she has noted swelling in her legs and feet since starting the medication.  Please see assessment and plan below.         Objective   Vital Signs:  /80 (BP Location: Left arm, Patient Position: Sitting, Cuff Size: Adult)   Pulse 64   Temp 97.6 °F (36.4 °C) (Temporal)   Ht 152.4 cm (60\")   Wt 75.5 kg (166 lb 6.4 oz)   SpO2 95%   BMI 32.50 kg/m²     Physical Exam  Vitals and nursing note reviewed.   Constitutional:       General: She is not in acute distress.     Appearance: She is obese. She is not ill-appearing or toxic-appearing.   HENT:      Head: Normocephalic and atraumatic.      Mouth/Throat:      Mouth: Mucous membranes are moist.      Pharynx: Oropharynx is clear.   Cardiovascular:      Rate and Rhythm: Normal rate and regular rhythm.      Pulses: Normal pulses.      Heart sounds: Normal heart sounds.   Pulmonary:      Effort: Pulmonary effort is normal.      Breath sounds: No wheezing, rhonchi or rales.   Abdominal:      General: Bowel sounds are normal. There is no distension.      Palpations: Abdomen is soft.      Tenderness: There is no abdominal tenderness.   Musculoskeletal:         General: Swelling (trace ankle/pedal edema) present. No tenderness. Normal range of motion.      Cervical back: Normal range of motion " and neck supple. No tenderness.   Skin:     General: Skin is warm and dry.      Findings: No erythema or rash.   Neurological:      General: No focal deficit present.      Mental Status: She is alert and oriented to person, place, and time.   Psychiatric:         Mood and Affect: Mood normal.         Behavior: Behavior normal.         Thought Content: Thought content normal.         Judgment: Judgment normal.          The following data was reviewed by: ARNALDO Vallejo on 06/06/2024:  CMP          2/29/2024    13:30 4/5/2024    13:36 6/3/2024    09:13   CMP   Glucose 104   143    BUN 26   24    Creatinine 0.96  0.80  0.81    Sodium 140   139    Potassium 4.3   4.8    Chloride 101   101    Calcium 9.8   9.5    Total Protein   6.7    Albumin   4.4    Globulin   2.3    Total Bilirubin   0.4    Alkaline Phosphatase   207    AST (SGOT)   18    ALT (SGPT)   17    BUN/Creatinine Ratio 27.1   29.6      CBC          2/29/2024    13:30 6/3/2024    09:13   CBC   WBC 5.12  6.23    RBC 4.94  4.68    Hemoglobin 15.2  14.5    Hematocrit 45.1  43.7    MCV 91.3  93.4    MCH 30.8  31.0    MCHC 33.7  33.2    RDW 13.5  13.6    Platelets 264  270      Lipid Panel          6/3/2024    09:13   Lipid Panel   Total Cholesterol 234    Triglycerides 585    HDL Cholesterol 32    VLDL Cholesterol 100    LDL Cholesterol  102      TSH          6/3/2024    09:13   TSH   TSH 2.090      Most Recent A1C          6/3/2024    09:13   HGBA1C Most Recent   Hemoglobin A1C 6.70      Microalbumin          6/3/2024    09:13   Microalbumin   Microalbumin, Urine 7.5      UA          2/29/2024    15:01 6/3/2024    09:13   Urinalysis   Ketones, UA Negative     Blood, UA  Negative    Leukocytes, UA Negative  See below:    Nitrite, UA  Negative    RBC, UA  0-2    Bacteria, UA  Comment      Data reviewed : Radiologic studies Mammogram January 2024 negative.  DEXA scan from January 2024 showed osteoporosis.  and GI studies colonoscopy from 2021 recommended  to repeat in           Assessment and Plan   Diagnoses and all orders for this visit:    1. Encounter for subsequent annual wellness visit (AWV) in Medicare patient (Primary)    2. Chronic headache with new features  -     Ambulatory Referral to Neurology    3. Medication side effect    4. Essential hypertension  -     metoprolol tartrate (LOPRESSOR) 25 MG tablet; Take 1 tablet by mouth 2 (Two) Times a Day.  Dispense: 180 tablet; Refill: 3    5. Type 2 diabetes mellitus with hyperglycemia, without long-term current use of insulin  -     glipizide (GLUCOTROL XL) 2.5 MG 24 hr tablet; Take 1 tablet by mouth Daily.  Dispense: 30 tablet; Refill: 2    6. Acquired hypothyroidism    7. High triglycerides    8. Panic attack    9. Breast cancer screening by mammogram  -     Mammo Screening Digital Tomosynthesis Bilateral With CAD; Future      Patient presents to the office today for subsequent Medicare wellness exam.  We reviewed her labs together in the office today.  CMP appears stable at this time.  Hemoglobin A1c continues to slowly increase, from 6.4 more to 6.7 today.  She had done very well taking Ozempic from November 2022 through June 2023 in terms of weight loss and A1c reduction.  She did have a history of pancreatitis many years ago and had been having some abdominal pain on the medication so we did decide to discontinue this in June 2023.  She has been able to maintain her weight loss off of the Ozempic.  Weight is stable today.  We had trialed her off of metformin to see if she can maintain reduced A1c with diet and exercise alone.  She had restarted them of her own accord between August and November as her glucoses were running a little higher.  At her last office visit, we did increase her metformin to 500 mg twice daily and despite that her encounter as above.  The patient feels as though metformin has really never worked well for her and would like to try something different, which I think is reasonable.  We  will trial low-dose glipizide.  We did discuss that this could cause hypoglycemia and to monitor blood glucoses closely.  Recheck A1c in 3 months.    Lipid panel does show some worsening of triglycerides from 378 up to 585.  Total and LDL cholesterol are elevated however stable.  She has not wanted to take medication for cholesterol in the past as she is afraid of adverse reaction as she is quite sensitive to medications.  We will continue to discuss this in the future as she may benefit from at least fenofibrate for triglyceride reduction as this is not as commonly known to cause myalgias like statin medications.  We have previously discussed omega-3 fish oil supplement.    Blood pressure is 140/80 in the office today.  She denies any chest pain, shortness of breath.  Continue losartan 100 mg daily, amlodipine 2.5 mg twice daily.  Will monitor for improvement of lower extremity edema with discontinuation of propranolol in favor of placing back on metoprolol.  Short-term follow-up in 1 month of symptoms and blood pressure.  We also discussed elevation of the lower extremities and use of compression stockings.  The patient did have someone come to her home for a screening physical exam as part of insurance purposes.  She apparently did have a peripheral artery disease evaluation test which showed mild impairment in the left leg.  Patient is not interested in any further testing at this time.    Patient is up-to-date on mammogram, last performed in January which was negative for malignancy.  Last colonoscopy performed in 2021 with a polyp noted.  Recommendations to repeat in 5 years.  Last taken in January did show findings of osteoporosis.  She is taking vitamin D supplement and a multivitamin that she believes contains calcium.  She does not wish for any medication specifically for osteoporosis at this time.  She has never been a smoker, does not qualify for lung cancer screening with low-dose chest CT.  She has  "been to the dentist and the eye doctor in the last year.  She declines any vaccines at this time.    Patient is also being seen today for potential adverse reaction to medication.  When I last saw the patient in February, she was complaining of difficulty with a headache following a respiratory illness in January.  This was some headaches that she had had in the past but she did have an intermittently occurring stabbing sensation to the right temple.  We have performed MRI of the brain which was normal.  I had placed her on propranolol in place of metoprolol as I was hopeful this may help with her headaches.  I had also given her samples of Nurtec which she states seem to affect her vision and made her feel \"out of it\".  After starting propranolol, she did seem to have some improvement both in blood pressure and headaches but now unfortunately she has noted swelling in her legs and feet since starting the medication.  We did discuss that amlodipine could cause swelling in her legs and feet as well, but she feels that this started only after starting the propranolol and would like to see if stopping this resolves the swelling, which I think is reasonable.  We will switch her back to metoprolol 25 mg twice daily.  She will continue to monitor her blood pressure closely at home.    Patient states the stabbing pain of the headache that she was describing/ice pick sensation does seem to be better and more intermittently occurring.  She is now describing more of a squeezing sensation on the top of her head now which causes her to feel anxious/panicky, and actually feels this has been worse on Propranolol as well.  She does have a history of panic attacks and anxiety.  Her MRI did show mild white matter foci of FLAIR signal abnormality which can be seen with chronic migraines or chronic small vessel ischemic changes.  She is unsure if she has migraines, does not have symptoms typical of migraines-denies sensitivity to " light/sound, numbness/tingling, vision changes associated with headaches.  She does sometimes have nausea but no vomiting.  She denies any unilateral tearing or rhinorrhea.  She uses Tylenol only very sparingly and is unsure if this helps much.  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  I have made a referral to neurology at [facility] for further evaluation of headaches.        I spent 55   minutes caring for Pauline on this date of service. This time includes time spent by me in the following activities:preparing for the visit, reviewing tests, obtaining and/or reviewing a separately obtained history, performing a medically appropriate examination and/or evaluation , counseling and educating the patient/family/caregiver, ordering medications, tests, or procedures, and documenting information in the medical record  Follow Up   Return in about 4 weeks (around 7/4/2024) for - recheck urine, Recheck, 45 min appt needed.  Patient was given instructions and counseling regarding her condition or for health maintenance advice. Please see specific information pulled into the AVS if appropriate.

## 2024-06-11 ENCOUNTER — TELEPHONE (OUTPATIENT)
Dept: INTERNAL MEDICINE | Facility: CLINIC | Age: 74
End: 2024-06-11

## 2024-06-11 RX ORDER — GLIPIZIDE 2.5 MG/1
2.5 TABLET, EXTENDED RELEASE ORAL DAILY
Qty: 30 TABLET | Refills: 2 | Status: SHIPPED | OUTPATIENT
Start: 2024-06-11

## 2024-06-11 NOTE — TELEPHONE ENCOUNTER
Caller: Pauline Molina    Relationship: Self    Best call back number: [phone]    What medication are you requesting: DIABETES    If a prescription is needed, what is your preferred pharmacy and phone number: [address] [phone] [facility] [phone]      Additional notes: PATIENT STATES THAT PROVIDER WANTED HER ON A MEDICATION FOR HER DIABETES AND A MEDICATION HAS NOT BEEN SENT IN YET.

## 2024-06-11 NOTE — TELEPHONE ENCOUNTER
Called and left message- pt is currently on metformin, was there a change in it or an entire new medication?

## 2024-06-11 NOTE — TELEPHONE ENCOUNTER
Hub staff attempted to follow warm transfer process and was unsuccessful     Caller: Pauline Molina    Relationship to patient: Self    Best call back number: [phone]    Patient is needing: PATIENT IS RETURNING CALL TO MAL ABOUT MEDICATION.

## 2024-06-24 ENCOUNTER — OFFICE VISIT (OUTPATIENT)
Dept: INTERNAL MEDICINE | Facility: CLINIC | Age: 74
End: 2024-06-24
Payer: MEDICARE

## 2024-06-24 VITALS
HEIGHT: 60 IN | BODY MASS INDEX: 32 KG/M2 | HEART RATE: 64 BPM | WEIGHT: 163 LBS | SYSTOLIC BLOOD PRESSURE: 158 MMHG | OXYGEN SATURATION: 95 % | DIASTOLIC BLOOD PRESSURE: 94 MMHG | TEMPERATURE: 96.9 F

## 2024-06-24 DIAGNOSIS — R33.9 INCOMPLETE BLADDER EMPTYING: ICD-10-CM

## 2024-06-24 DIAGNOSIS — N30.10 INTERSTITIAL CYSTITIS: Primary | ICD-10-CM

## 2024-06-24 DIAGNOSIS — R30.0 DYSURIA: ICD-10-CM

## 2024-06-24 LAB
BILIRUB BLD-MCNC: NEGATIVE MG/DL
CLARITY, POC: CLEAR
COLOR UR: YELLOW
EXPIRATION DATE: NORMAL
GLUCOSE UR STRIP-MCNC: NEGATIVE MG/DL
KETONES UR QL: NEGATIVE
LEUKOCYTE EST, POC: NEGATIVE
Lab: NORMAL
NITRITE UR-MCNC: NEGATIVE MG/ML
PH UR: 7 [PH] (ref 5–8)
PROT UR STRIP-MCNC: NEGATIVE MG/DL
RBC # UR STRIP: NEGATIVE /UL
SP GR UR: 1.01 (ref 1–1.03)
UROBILINOGEN UR QL: NORMAL

## 2024-06-24 PROCEDURE — 1159F MED LIST DOCD IN RCRD: CPT | Performed by: NURSE PRACTITIONER

## 2024-06-24 PROCEDURE — 1126F AMNT PAIN NOTED NONE PRSNT: CPT | Performed by: NURSE PRACTITIONER

## 2024-06-24 PROCEDURE — 1160F RVW MEDS BY RX/DR IN RCRD: CPT | Performed by: NURSE PRACTITIONER

## 2024-06-24 PROCEDURE — 3077F SYST BP >= 140 MM HG: CPT | Performed by: NURSE PRACTITIONER

## 2024-06-24 PROCEDURE — 81003 URINALYSIS AUTO W/O SCOPE: CPT | Performed by: NURSE PRACTITIONER

## 2024-06-24 PROCEDURE — 3044F HG A1C LEVEL LT 7.0%: CPT | Performed by: NURSE PRACTITIONER

## 2024-06-24 PROCEDURE — 99214 OFFICE O/P EST MOD 30 MIN: CPT | Performed by: NURSE PRACTITIONER

## 2024-06-24 PROCEDURE — 3080F DIAST BP >= 90 MM HG: CPT | Performed by: NURSE PRACTITIONER

## 2024-06-24 RX ORDER — PHENAZOPYRIDINE HYDROCHLORIDE 200 MG/1
200 TABLET, FILM COATED ORAL 3 TIMES DAILY PRN
Qty: 6 TABLET | Refills: 0 | Status: SHIPPED | OUTPATIENT
Start: 2024-06-24

## 2024-06-24 RX ORDER — METFORMIN HYDROCHLORIDE 500 MG/1
500 TABLET, EXTENDED RELEASE ORAL 2 TIMES DAILY
COMMUNITY
Start: 2024-06-14

## 2024-06-24 NOTE — PROGRESS NOTES
Subjective     Chief Complaint:  Burning with urination.  Lower abdominal pain.    HPI:  Patient presents to the office today with a 3-week history of burning with urination that has progressively worsened.  She has had trouble with intermittently occurring burning with urination but states this has been more constant over the last 3 weeks.  Please see assessment and plan below.    Patient's PMR from outside medical facility reviewed and noted.    Past Medical History:   Past Medical History:   Diagnosis Date    Arrhythmia     Family history of colonic polyps     GERD (gastroesophageal reflux disease)     History of adenomatous polyp of colon     History of colon polyps     Hyperlipidemia     Hypertension     MVP (mitral valve prolapse)     Pseudotumor cerebri     Type 2 diabetes mellitus      Past Surgical History:  Past Surgical History:   Procedure Laterality Date    CARDIAC CATHETERIZATION      CATARACT EXTRACTION Right 08/15/2021    CATARACT EXTRACTION Left 2021     SECTION      CHOLECYSTECTOMY      COLONOSCOPY  2015    One 3-4mm hyperplastic polyp in the rectum; Repeat 5 years    COLONOSCOPY  2007    Normal; Repeat 3-4 years    COLONOSCOPY  2004    One 2cm pedunculated erythematous adenomatous polyp in the sigmoid colon; One 6mm hyperplastic polyp in the rectum; Repeat 3 years    COLONOSCOPY N/A 2021    One 7mm tubular adenomatous polyp in the transverse colon; The examination was otherwise normal on direct and retroflexion views; Repeat 5 years    DILATATION AND CURETTAGE      ENDOSCOPY  2009    Normal endoscopy    ENDOSCOPY  2004    GERD    ENDOSCOPY N/A 2023    Procedure: ESOPHAGOGASTRODUODENOSCOPY WITH ANESTHESIA;  Surgeon: Kelly Kim MD;  Location: [facility] ENDOSCOPY;  Service: Gastroenterology;  Laterality: N/A;  preop; dysphagia  postop; HIatal hernia; schotski ring   pcp Yuni Pierson     HERNIA REPAIR      HYSTERECTOMY       LAPAROSCOPIC LYSIS OF ADHESIONS      OOPHORECTOMY      OTHER SURGICAL HISTORY  06/14/2010    EUS-Dr. Hastings-Normal EUS evaluation of the pancreas, bile duct, and ampulla of Vater-pancreatitis remains idiopathic     Social History:  reports that she has never smoked. She has never used smokeless tobacco. She reports that she does not drink alcohol and does not use drugs.    Family History: family history includes Breast cancer in her maternal grandmother; Colon polyps in her father; Heart disease in her mother; Prostate cancer in her father; Stomach cancer (age of onset: 48) in her paternal grandmother.      Allergies:  Allergies   Allergen Reactions    Lactose Intolerance (Gi) GI Intolerance     Take Lactaid    Tilactase GI Intolerance     Medications:  Prior to Admission medications    Medication Sig Start Date End Date Taking? Authorizing Provider   Acetaminophen (TYLENOL PO) Take  by mouth As Needed.   Yes ProviderAnn Marie MD   amLODIPine (NORVASC) 2.5 MG tablet Take 1 tablet by mouth 2 (Two) Times a Day. 3/28/24  Yes Yuni Sanchez APRN   Barberry-Oreg Grape-Goldenseal (BERBERINE COMPLEX PO) Take 1 capsule by mouth Daily.   Yes ProviderAnn Marie MD   Coenzyme Q10 (CoQ-10) 100 MG capsule Take 1 capsule by mouth Daily.   Yes ProviderAnn Marie MD   Fenugreek 500 MG capsule Take 1 capsule by mouth Daily.   Yes ProviderAnn Marie MD   glipizide (GLUCOTROL XL) 2.5 MG 24 hr tablet Take 1 tablet by mouth Daily. 6/11/24  Yes Yuni Sanchez APRN   losartan (COZAAR) 100 MG tablet Take 1 tablet by mouth once daily 10/10/23  Yes Yuni Sanchez APRN   magnesium oxide (MAG-OX) 400 MG tablet Take 1 tablet by mouth Daily.   Yes ProviderAnn Marie MD   metFORMIN ER (GLUCOPHAGE-XR) 500 MG 24 hr tablet Take 1 tablet by mouth 2 (Two) Times a Day. 6/14/24  Yes Ann Marie Padgett MD   metoprolol tartrate (LOPRESSOR) 25 MG tablet Take 1 tablet by mouth 2 (Two) Times a Day. 6/22/24  Yes Daniel  "ARNALDO Perales   omeprazole (priLOSEC) 40 MG capsule Take 1 capsule by mouth Daily. 1/5/23  Yes Genna Martinez APRN   Unable to find 1 each 1 (One) Time. Liver Cleanse   Gall bladder   Yes Provider, MD Ann Marie   vitamin B-12 (CYANOCOBALAMIN) 1000 MCG tablet Take 1 tablet by mouth Daily. Newman   Yes Provider, MD Ann Marie   vitamin D3 125 MCG (5000 UT) capsule capsule Take 1 capsule by mouth Daily.   Yes Provider, MD Ann Marie   phenazopyridine (Pyridium) 200 MG tablet Take 1 tablet by mouth 3 (Three) Times a Day As Needed for Bladder Spasms. 6/24/24   Yuni Sanchez APRN       Objective     Vital Signs: /94 (BP Location: Left arm, Patient Position: Sitting, Cuff Size: Adult)   Pulse 64   Temp 96.9 °F (36.1 °C) (Temporal)   Ht 152.4 cm (60\")   Wt 73.9 kg (163 lb)   SpO2 95%   BMI 31.83 kg/m²   Physical Exam  Vitals and nursing note reviewed.   Constitutional:       General: She is not in acute distress.     Appearance: She is obese. She is not ill-appearing or toxic-appearing.   HENT:      Head: Normocephalic and atraumatic.      Mouth/Throat:      Mouth: Mucous membranes are moist.      Pharynx: Oropharynx is clear.   Cardiovascular:      Rate and Rhythm: Normal rate and regular rhythm.      Pulses: Normal pulses.      Heart sounds: Normal heart sounds.   Pulmonary:      Effort: Pulmonary effort is normal.      Breath sounds: No wheezing, rhonchi or rales.   Abdominal:      General: Bowel sounds are normal. There is distension.      Palpations: Abdomen is soft.      Tenderness: There is no abdominal tenderness.   Musculoskeletal:         General: No swelling or tenderness. Normal range of motion.      Cervical back: Normal range of motion and neck supple. No tenderness.   Skin:     General: Skin is warm and dry.      Findings: No erythema or rash.   Neurological:      General: No focal deficit present.      Mental Status: She is alert and oriented to person, place, and time. " "  Psychiatric:         Mood and Affect: Mood normal.         Behavior: Behavior normal.         Thought Content: Thought content normal.         Judgment: Judgment normal.       Results Reviewed:  POC Urinalysis Dipstick, Automated (06/24/2024 13:31)     Assessment / Plan     Assessment/Plan:  Diagnoses and all orders for this visit:    1. Interstitial cystitis (Primary)  -     Ambulatory Referral to Urology  -     CT Abdomen Pelvis With & Without Contrast; Future    2. Dysuria  -     POC Urinalysis Dipstick, Automated  -     phenazopyridine (Pyridium) 200 MG tablet; Take 1 tablet by mouth 3 (Three) Times a Day As Needed for Bladder Spasms.  Dispense: 6 tablet; Refill: 0  -     Urine Culture - Urine, Urine, Clean Catch  -     Ambulatory Referral to Urology    3. Incomplete bladder emptying  -     Ambulatory Referral to Urology  -     CT Abdomen Pelvis With & Without Contrast; Future       Patient presents to the office today with a 3-week history of burning with urination.  She has had difficulty with intermittently occurring burning with urination for years, but this historically has been fairly infrequent.  For the last 3 weeks, this has seemed to be more constant.  The patient indicates really since the beginning of the year she has just been feeling very unwell in general.  The patient states the urethral burning can occur even without urination.  She does not feel that she has had any vaginal itching, rash or discharge.  She does note some vaginal dryness, which is not necessarily new or worsening.  We did discuss application of coconut oil daily to help with vaginal dryness/atrophy.  The patient also has lower abdominal/bladder pain with or without urination as well.  She relates feelings of incomplete bladder emptying saying that her bladder always feels full.  She has had both urgency and frequency.  She also sometimes notes an \"extreme odor\", but this is fairly infrequent as well.  She has had no fever or " chills that she is aware of.  She has had no constipation and actually has fairly frequent bouts of diarrhea.  She does not necessarily note incontinent episodes.  She has had multiple abdominal surgeries and indicates that she has been told about adhesions previously and did have to have a lysis of adhesions 13 or 14 years ago.    Patient does not note any correlation in her symptoms relating to certain foods that she has eaten.  She does try to limit sugar intake and does not drink sugary beverages.  She limits caffeine only drinking coffee in the mornings.    The patient's urinalysis today is unremarkable.  I will send for a culture given her symptoms, we will follow-up with these results and make changes to plan of care as necessary.  Will hold on any antibiotic therapy for now until culture results return.  I feels that the patient's symptoms could be consistent with interstitial cystitis.  I would hold on treatment with an antihistamine for now given her feelings of incomplete bladder emptying so as not to potentially worsen urinary retention.  Would also be hesitant to prescribe anything like amitriptyline as she is very hesitant to take medications in general.  We will prescribe a short course of Pyridium for her to take as needed for bladder pain/spasms.  As she has mentioned having multiple abdominal surgeries in the past and difficulty with adhesions I do feel that a CT of the abdomen and pelvis would be beneficial.  I will also refer her to urology for further evaluation and recommendations.    Return for Next scheduled follow up. unless patient needs to be seen sooner or acute issues arise.    I have discussed the patient results/orders and and plan/recommendation with them at today's visit.      Yuni Sanchez, ARNALDO   06/24/2024

## 2024-06-26 LAB
BACTERIA UR CULT: NORMAL
BACTERIA UR CULT: NORMAL

## 2024-06-26 NOTE — PROGRESS NOTES
No bacteria noted on patient's urine culture, so I would await recommendations from urology once they are able to set up her appointment and also await results of CT of the abdomen and pelvis once this gets scheduled.  Patient needs to continue to try to push drinking water.

## 2024-07-03 NOTE — PROGRESS NOTES
Subjective    Ms. Molina is 74 y.o. female    Chief Complaint: Urine frequency, urgency and burning    History of Present Illness    74-year-old female new patient referred for urine frequency, urgency and vaginal burning.  Was seen by PCP during annual follow-up when patient initially complained of vaginal burning urinalysis was completed revealing no bacteria.  Reports symptoms have been ongoing for many years.  Requires pad usage daily due to inability to make it to the bathroom in time.  Along with occasional mild leakage from coughing, laughing, sneezing or position change.  Reports suprapubic pressure and at times feeling of incomplete emptying.  Denies external bulge.    Underwent CT with and without contrast revealing what appears to be a fallen bladder as well as a small area within a small area within the right mid kidney lateral margin 1.2 cm too small to fully characterize showing Hounsfield units of 38 .recommending follow-up evaluation.      The following portions of the patient's history were reviewed and updated as appropriate: allergies, current medications, past family history, past medical history, past social history, past surgical history and problem list.    Review of Systems   Constitutional:  Negative for chills and fever.   Gastrointestinal:  Negative for abdominal pain, anal bleeding, blood in stool, nausea and vomiting.   Genitourinary:  Positive for dysuria, frequency and urgency. Negative for hematuria.         Current Outpatient Medications:     Acetaminophen (TYLENOL PO), Take  by mouth As Needed., Disp: , Rfl:     amLODIPine (NORVASC) 2.5 MG tablet, Take 2 tablets by mouth 2 (Two) Times a Day., Disp: , Rfl:     Barberry-Oreg Grape-Goldenseal (BERBERINE COMPLEX PO), Take 1 capsule by mouth Daily., Disp: , Rfl:     Coenzyme Q10 (CoQ-10) 100 MG capsule, Take 1 capsule by mouth Daily., Disp: , Rfl:     famotidine (Pepcid) 20 MG tablet, Take 1 tablet by mouth 2 (Two) Times a Day., Disp:  60 tablet, Rfl: 2    Fenugreek 500 MG capsule, Take 1 capsule by mouth Daily., Disp: , Rfl:     losartan (COZAAR) 100 MG tablet, Take 1 tablet by mouth once daily, Disp: 90 tablet, Rfl: 3    magnesium oxide (MAG-OX) 400 MG tablet, Take 1 tablet by mouth Daily., Disp: , Rfl:     metFORMIN ER (GLUCOPHAGE-XR) 500 MG 24 hr tablet, Take 1 tablet by mouth 2 (Two) Times a Day., Disp: , Rfl:     metoprolol tartrate (LOPRESSOR) 25 MG tablet, Take 1 tablet by mouth 2 (Two) Times a Day., Disp: 180 tablet, Rfl: 3    sennosides-docusate (Senokot S) 8.6-50 MG per tablet, Take 1 tablet by mouth twice daily for 3 days, then decrease to 1 tablet once daily, Disp: 60 tablet, Rfl: 1    vitamin B-12 (CYANOCOBALAMIN) 1000 MCG tablet, Take 1 tablet by mouth Daily. Spray, Disp: , Rfl:     vitamin D3 125 MCG (5000 UT) capsule capsule, Take 1 capsule by mouth Daily., Disp: , Rfl:     [START ON 2024] estradiol (ESTRACE) 0.1 MG/GM vaginal cream, Insert 2 g into the vagina 3 (Three) Times a Week., Disp: 42.5 g, Rfl: 5    glipizide (GLUCOTROL XL) 2.5 MG 24 hr tablet, Take 1 tablet by mouth Daily. (Patient not taking: Reported on 2024), Disp: 30 tablet, Rfl: 2    phenazopyridine (Pyridium) 200 MG tablet, Take 1 tablet by mouth 3 (Three) Times a Day As Needed for Bladder Spasms. (Patient not taking: Reported on 2024), Disp: 6 tablet, Rfl: 0    Past Medical History:   Diagnosis Date    Arrhythmia     Family history of colonic polyps     GERD (gastroesophageal reflux disease)     History of adenomatous polyp of colon     History of colon polyps     Hyperlipidemia     Hypertension     MVP (mitral valve prolapse)     Pseudotumor cerebri     Type 2 diabetes mellitus        Past Surgical History:   Procedure Laterality Date    CARDIAC CATHETERIZATION      CATARACT EXTRACTION Right 08/15/2021    CATARACT EXTRACTION Left 2021     SECTION      CHOLECYSTECTOMY      COLONOSCOPY  2015    One 3-4mm hyperplastic polyp in the  "rectum; Repeat 5 years    COLONOSCOPY  09/17/2007    Normal; Repeat 3-4 years    COLONOSCOPY  07/12/2004    One 2cm pedunculated erythematous adenomatous polyp in the sigmoid colon; One 6mm hyperplastic polyp in the rectum; Repeat 3 years    COLONOSCOPY N/A 06/18/2021    One 7mm tubular adenomatous polyp in the transverse colon; The examination was otherwise normal on direct and retroflexion views; Repeat 5 years    DILATATION AND CURETTAGE      ENDOSCOPY  09/30/2009    Normal endoscopy    ENDOSCOPY  08/30/2004    GERD    ENDOSCOPY N/A 2/13/2023    Procedure: ESOPHAGOGASTRODUODENOSCOPY WITH ANESTHESIA;  Surgeon: Kelly Kim MD;  Location: [facility] ENDOSCOPY;  Service: Gastroenterology;  Laterality: N/A;  preop; dysphagia  postop; HIatal hernia; schotski ring   pcp Yuni Pierson     HERNIA REPAIR      HYSTERECTOMY      LAPAROSCOPIC LYSIS OF ADHESIONS      OOPHORECTOMY      OTHER SURGICAL HISTORY  06/14/2010    EUS-Dr. Hastings-Normal EUS evaluation of the pancreas, bile duct, and ampulla of Vater-pancreatitis remains idiopathic       Social History     Socioeconomic History    Marital status:    Tobacco Use    Smoking status: Never    Smokeless tobacco: Never   Vaping Use    Vaping status: Never Used   Substance and Sexual Activity    Alcohol use: Never    Drug use: Never    Sexual activity: Defer       Family History   Problem Relation Age of Onset    Heart disease Mother     Prostate cancer Father     Colon polyps Father         > 60 years of age    Breast cancer Maternal Grandmother     Stomach cancer Paternal Grandmother 48    Colon cancer Neg Hx     Esophageal cancer Neg Hx     Liver cancer Neg Hx     Liver disease Neg Hx     Rectal cancer Neg Hx        Objective    Temp 97.4 °F (36.3 °C)   Ht 152.4 cm (60\")   Wt 76.1 kg (167 lb 12.8 oz)   BMI 32.77 kg/m²     Physical Exam  Nursing note reviewed.   Constitutional:       General: She is not in acute distress.     Appearance: Normal appearance. She is " not ill-appearing.   HENT:      Nose: No congestion.   Abdominal:      Tenderness: There is no right CVA tenderness or left CVA tenderness.      Hernia: No hernia is present.   Skin:     General: Skin is warm and dry.   Neurological:      Mental Status: She is alert and oriented to person, place, and time.   Psychiatric:         Mood and Affect: Mood normal.         Behavior: Behavior normal.             Results for orders placed or performed in visit on 07/16/24   POC Urinalysis Dipstick, Multipro    Specimen: Urine   Result Value Ref Range    Color Yellow Yellow, Straw, Dark Yellow, Taniya    Clarity, UA Clear Clear    Glucose, UA Negative Negative mg/dL    Bilirubin Negative Negative    Ketones, UA Negative Negative    Specific Gravity  1.015 1.005 - 1.030    Blood, UA Negative Negative    pH, Urine 7.0 5.0 - 8.0    Protein, POC Negative Negative mg/dL    Urobilinogen, UA 0.2 E.U./dL Normal, 0.2 E.U./dL    Nitrite, UA Negative Negative    Leukocytes Negative Negative   Estimation of residual urine via abdominal ultrasound  Residual Urine: 63 ml  Indication: frequency  Position: Supine  Examination: Incremental scanning of the suprapubic area using 3 MHz transducer using copious amounts of acoustic gel.   Findings: An anechoic area was demonstrated which represented the bladder, with measurement of residual urine as noted. I inspected this myself. In that the residual urine was stable or insignificant, no treatment will be necessary at this time.    CT Abdomen Pelvis With & Without Contrast (07/05/2024 08:05)   Assessment and Plan    Diagnoses and all orders for this visit:    1. Dysuria (Primary)  -     POC Urinalysis Dipstick, Multipro    2. Urge incontinence    3. Atrophic vaginitis  -     estradiol (ESTRACE) 0.1 MG/GM vaginal cream; Insert 2 g into the vagina 3 (Three) Times a Week.  Dispense: 42.5 g; Refill: 5    4. Renal lesion  -     CT Abdomen Kidney With & Without Contrast; Future      After lengthy  discussion with patient it sounds as though patient is experiencing more problems from vaginal atrophy as well as overactive bladder and mixed incontinence.  Will start patient on vaginal estrogen cream estradiol to be used 3 times weekly starting with daily usage x 1 to 2 weeks.    Have provided patient with a 6 weeks sample pack Gemtesa due to urge incontinence greater than stress    Recommend follow-up in 6 months due to new finding of 1.2 cm low-density exophytic lesion along the lateral margin of right mid kidney with CT renal mass protocol

## 2024-07-05 ENCOUNTER — HOSPITAL ENCOUNTER (OUTPATIENT)
Dept: CT IMAGING | Facility: HOSPITAL | Age: 74
Discharge: HOME | End: 2024-07-05
Payer: MEDICARE

## 2024-07-05 DIAGNOSIS — N30.10 INTERSTITIAL CYSTITIS: ICD-10-CM

## 2024-07-05 DIAGNOSIS — R33.9 INCOMPLETE BLADDER EMPTYING: ICD-10-CM

## 2024-07-05 LAB — CREAT BLDA-MCNC: 1 MG/DL (ref 0.6–1.3)

## 2024-07-05 PROCEDURE — 82565 ASSAY OF CREATININE: CPT

## 2024-07-05 PROCEDURE — 74178 CT ABD&PLV WO CNTR FLWD CNTR: CPT

## 2024-07-05 PROCEDURE — 25510000001 IOPAMIDOL 61 % SOLUTION: Performed by: NURSE PRACTITIONER

## 2024-07-05 RX ADMIN — IOPAMIDOL 100 ML: 612 INJECTION, SOLUTION INTRAVENOUS at 08:06

## 2024-07-08 ENCOUNTER — OFFICE VISIT (OUTPATIENT)
Dept: INTERNAL MEDICINE | Facility: CLINIC | Age: 74
End: 2024-07-08
Payer: MEDICARE

## 2024-07-08 VITALS
SYSTOLIC BLOOD PRESSURE: 162 MMHG | BODY MASS INDEX: 32.39 KG/M2 | TEMPERATURE: 97.8 F | HEIGHT: 60 IN | HEART RATE: 67 BPM | OXYGEN SATURATION: 95 % | WEIGHT: 165 LBS | DIASTOLIC BLOOD PRESSURE: 90 MMHG

## 2024-07-08 DIAGNOSIS — N28.9 RENAL LESION: ICD-10-CM

## 2024-07-08 DIAGNOSIS — K44.9 HIATAL HERNIA WITH GERD: ICD-10-CM

## 2024-07-08 DIAGNOSIS — I10 ESSENTIAL HYPERTENSION: ICD-10-CM

## 2024-07-08 DIAGNOSIS — R30.0 DYSURIA: Primary | ICD-10-CM

## 2024-07-08 DIAGNOSIS — N81.10 FEMALE CYSTOCELE: ICD-10-CM

## 2024-07-08 DIAGNOSIS — K59.00 CONSTIPATION, UNSPECIFIED CONSTIPATION TYPE: ICD-10-CM

## 2024-07-08 DIAGNOSIS — K21.9 HIATAL HERNIA WITH GERD: ICD-10-CM

## 2024-07-08 PROCEDURE — 1126F AMNT PAIN NOTED NONE PRSNT: CPT | Performed by: NURSE PRACTITIONER

## 2024-07-08 PROCEDURE — 3044F HG A1C LEVEL LT 7.0%: CPT | Performed by: NURSE PRACTITIONER

## 2024-07-08 PROCEDURE — 99214 OFFICE O/P EST MOD 30 MIN: CPT | Performed by: NURSE PRACTITIONER

## 2024-07-08 PROCEDURE — 81003 URINALYSIS AUTO W/O SCOPE: CPT | Performed by: NURSE PRACTITIONER

## 2024-07-08 PROCEDURE — 3080F DIAST BP >= 90 MM HG: CPT | Performed by: NURSE PRACTITIONER

## 2024-07-08 PROCEDURE — 3077F SYST BP >= 140 MM HG: CPT | Performed by: NURSE PRACTITIONER

## 2024-07-08 RX ORDER — FAMOTIDINE 20 MG/1
20 TABLET, FILM COATED ORAL 2 TIMES DAILY
Qty: 60 TABLET | Refills: 2 | Status: SHIPPED | OUTPATIENT
Start: 2024-07-08

## 2024-07-08 RX ORDER — AMLODIPINE BESYLATE 2.5 MG/1
5 TABLET ORAL 2 TIMES DAILY
Start: 2024-07-08

## 2024-07-08 RX ORDER — AMOXICILLIN 250 MG
CAPSULE ORAL
Qty: 60 TABLET | Refills: 1 | Status: SHIPPED | OUTPATIENT
Start: 2024-07-08

## 2024-07-08 NOTE — PROGRESS NOTES
Subjective     Chief Complaint:  Nausea.  Reflux.  Follow-up dysuria    HPI:  Patient presents to the office today for a follow-up regarding hypertension and dysuria.  She also notes continued problems with nausea and reflux.  Please see assessment and plan below.    Patient's PMR from outside medical facility reviewed and noted.    Past Medical History:   Past Medical History:   Diagnosis Date    Arrhythmia     Family history of colonic polyps     GERD (gastroesophageal reflux disease)     History of adenomatous polyp of colon     History of colon polyps     Hyperlipidemia     Hypertension     MVP (mitral valve prolapse)     Pseudotumor cerebri     Type 2 diabetes mellitus      Past Surgical History:  Past Surgical History:   Procedure Laterality Date    CARDIAC CATHETERIZATION      CATARACT EXTRACTION Right 08/15/2021    CATARACT EXTRACTION Left 2021     SECTION      CHOLECYSTECTOMY      COLONOSCOPY  2015    One 3-4mm hyperplastic polyp in the rectum; Repeat 5 years    COLONOSCOPY  2007    Normal; Repeat 3-4 years    COLONOSCOPY  2004    One 2cm pedunculated erythematous adenomatous polyp in the sigmoid colon; One 6mm hyperplastic polyp in the rectum; Repeat 3 years    COLONOSCOPY N/A 2021    One 7mm tubular adenomatous polyp in the transverse colon; The examination was otherwise normal on direct and retroflexion views; Repeat 5 years    DILATATION AND CURETTAGE      ENDOSCOPY  2009    Normal endoscopy    ENDOSCOPY  2004    GERD    ENDOSCOPY N/A 2023    Procedure: ESOPHAGOGASTRODUODENOSCOPY WITH ANESTHESIA;  Surgeon: Kelly Kim MD;  Location: [facility] ENDOSCOPY;  Service: Gastroenterology;  Laterality: N/A;  preop; dysphagia  postop; HIatal hernia; schotski ring   pcp Yuni Woetlz     HERNIA REPAIR      HYSTERECTOMY      LAPAROSCOPIC LYSIS OF ADHESIONS      OOPHORECTOMY      OTHER SURGICAL HISTORY  2010    EUS-Dr. Hastings-Normal EUS  evaluation of the pancreas, bile duct, and ampulla of Vater-pancreatitis remains idiopathic     Social History:  reports that she has never smoked. She has never used smokeless tobacco. She reports that she does not drink alcohol and does not use drugs.    Family History: family history includes Breast cancer in her maternal grandmother; Colon polyps in her father; Heart disease in her mother; Prostate cancer in her father; Stomach cancer (age of onset: 48) in her paternal grandmother.      Allergies:  Allergies   Allergen Reactions    Lactose Intolerance (Gi) GI Intolerance     Take Lactaid     Medications:  Prior to Admission medications    Medication Sig Start Date End Date Taking? Authorizing Provider   Acetaminophen (TYLENOL PO) Take  by mouth As Needed.   Yes Ann Marie Padgett MD   amLODIPine (NORVASC) 2.5 MG tablet Take 2 tablets by mouth 2 (Two) Times a Day. 7/8/24  Yes Yuni Sanchez APRN   Barberry-Oreg Grape-Goldenseal (BERBERINE COMPLEX PO) Take 1 capsule by mouth Daily.   Yes Ann Marie Padgett MD   Coenzyme Q10 (CoQ-10) 100 MG capsule Take 1 capsule by mouth Daily.   Yes Ann Marie Padgett MD   Fenugreek 500 MG capsule Take 1 capsule by mouth Daily.   Yes Ann Marie Padgett MD   losartan (COZAAR) 100 MG tablet Take 1 tablet by mouth once daily 10/10/23  Yes Yuni Sanchez APRN   magnesium oxide (MAG-OX) 400 MG tablet Take 1 tablet by mouth Daily.   Yes Ann Marie Padgett MD   metFORMIN ER (GLUCOPHAGE-XR) 500 MG 24 hr tablet Take 1 tablet by mouth 2 (Two) Times a Day. 6/14/24  Yes Ann Marie Padgett MD   metoprolol tartrate (LOPRESSOR) 25 MG tablet Take 1 tablet by mouth 2 (Two) Times a Day. 6/22/24  Yes Yuni Sanchez APRN   vitamin B-12 (CYANOCOBALAMIN) 1000 MCG tablet Take 1 tablet by mouth Daily. [place]   Yes Ann Marie Padgett MD   vitamin D3 125 MCG (5000 UT) capsule capsule Take 1 capsule by mouth Daily.   Yes Ann Marie Padgett MD   amLODIPine (NORVASC) 2.5  "MG tablet Take 1 tablet by mouth 2 (Two) Times a Day. 3/28/24 7/8/24 Yes Yuni Sanchez APRN   omeprazole (priLOSEC) 40 MG capsule Take 1 capsule by mouth Daily. 1/5/23 7/8/24 Yes Genna Martinez APRN   famotidine (Pepcid) 20 MG tablet Take 1 tablet by mouth 2 (Two) Times a Day. 7/8/24   Yuni Sanchez APRN   glipizide (GLUCOTROL XL) 2.5 MG 24 hr tablet Take 1 tablet by mouth Daily.  Patient not taking: Reported on 7/8/2024 6/11/24   Yuni Sanchez APRN   phenazopyridine (Pyridium) 200 MG tablet Take 1 tablet by mouth 3 (Three) Times a Day As Needed for Bladder Spasms.  Patient not taking: Reported on 7/8/2024 6/24/24   Yuni Sanchez APRN   sennosides-docusate (Senokot S) 8.6-50 MG per tablet Take 1 tablet by mouth twice daily for 3 days, then decrease to 1 tablet once daily 7/8/24   Yuni Sanchez APRN   Unable to find 1 each 1 (One) Time. Liver Cleanse   Gall bladder  7/8/24  Provider, MD Ann Marie       Objective     Vital Signs: /90 (BP Location: Left arm, Patient Position: Sitting, Cuff Size: Adult)   Pulse 67   Temp 97.8 °F (36.6 °C) (Temporal)   Ht 152.4 cm (60\")   Wt 74.8 kg (165 lb)   SpO2 95%   BMI 32.22 kg/m²   Physical Exam  Vitals and nursing note reviewed.   Constitutional:       General: She is not in acute distress.     Appearance: She is obese. She is not ill-appearing or toxic-appearing.   HENT:      Head: Normocephalic and atraumatic.      Mouth/Throat:      Mouth: Mucous membranes are moist.      Pharynx: Oropharynx is clear.   Cardiovascular:      Rate and Rhythm: Normal rate and regular rhythm.      Pulses: Normal pulses.      Heart sounds: Normal heart sounds.   Pulmonary:      Effort: Pulmonary effort is normal.      Breath sounds: No wheezing, rhonchi or rales.   Abdominal:      General: Bowel sounds are normal. There is distension.      Palpations: Abdomen is soft.      Tenderness: There is no abdominal tenderness.   Musculoskeletal:         " General: No swelling or tenderness. Normal range of motion.      Cervical back: Normal range of motion and neck supple. No tenderness.   Skin:     General: Skin is warm and dry.      Findings: No erythema or rash.   Neurological:      General: No focal deficit present.      Mental Status: She is alert and oriented to person, place, and time.   Psychiatric:         Mood and Affect: Mood normal.         Behavior: Behavior normal.         Thought Content: Thought content normal.         Judgment: Judgment normal.       Results Reviewed:  POC Urinalysis Dipstick, Automated (07/08/2024 13:17)   CT Abdomen Pelvis With & Without Contrast (07/05/2024 08:05)   UPPER GI ENDOSCOPY (02/13/2023 07:36)   COLONOSCOPY (06/18/2021 08:50)   Assessment / Plan     Assessment/Plan:  Diagnoses and all orders for this visit:    1. Dysuria (Primary)  -     POC Urinalysis Dipstick, Automated    2. Essential hypertension  -     amLODIPine (NORVASC) 2.5 MG tablet; Take 2 tablets by mouth 2 (Two) Times a Day.    3. Female cystocele  -     Ambulatory Referral to Gynecology    4. Renal lesion    5. Constipation, unspecified constipation type    6. Hiatal hernia with GERD  Overview:  Endoscopy 2/2023 does not show esophagitis.  Small hiatal hernia with Schatzki's ring dilated to 18 mm done.  Started on Lasix 40 mg daily.    Anti-reflux measures were reviewed and discussed with patient.  Pt advised to refrain from chocolate, alcohol, smoking, peppermint and caffeine.  Also advised to limit fatty foods, large meals, and eating late at nighttime.  Advised to reach an ideal body mass index.      Other orders  -     famotidine (Pepcid) 20 MG tablet; Take 1 tablet by mouth 2 (Two) Times a Day.  Dispense: 60 tablet; Refill: 2  -     sennosides-docusate (Senokot S) 8.6-50 MG per tablet; Take 1 tablet by mouth twice daily for 3 days, then decrease to 1 tablet once daily  Dispense: 60 tablet; Refill: 1       Patient presents to the office today for a  follow-up.  When she was seen on 6/6 for subsequent Medicare wellness exam, she had indicated potential adverse reaction to medication.  When I had seen her in February, she was complaining of difficulty with a headache following a respiratory illness in January.  This was similar to headaches that she had had in the past but she was having an intermittently occurring stabbing sensation to the right temple.  I had placed her on propranolol in place of metoprolol as I was hopeful this may help with the headaches.  After starting propranolol, she initially seemed to have some improvement both in blood pressure and headaches but unfortunately noted swelling in her legs and feet.  We did discontinue propranolol and placed her back on metoprolol and she indicates the swelling in her legs and feet resolved after stopping propranolol.  She does indicate some improvement in her headaches overall as well but does have an upcoming appointment with neurology in September for further evaluation.  We did perform a MRI earlier this year which did show mild white matter foci of FLAIR signal abnormality which can be seen with chronic migraines or chronic small vessel ischemic changes.  She had previously been following with Dr. Morse at [facility] for a choroid plexus cyst which was not mentioned on the most recent MRI.    The patient's blood pressure is quite elevated in the office today at 162/90.  She has not been checking this consistently at home but does feel as though her blood pressure can be more elevated if she is anxious, and she was feeling anxious about her appointment today.  We discussed increasing her metoprolol, however in the past when she was taking higher doses of metoprolol her heart rate would drop too low and make her feel very fatigued.  She is agreeable to increase her amlodipine.  Will increase from 2.5 mg twice daily to 5 mg twice daily and have asked her to monitor her blood pressure more consistently  at home.    During her last office visit on 6/24, she had indicated a 3-week history of consistent burning with urination.  This has been intermittently occurring for years but historically had been more infrequent.  She had indicated that urethral and vaginal burning could occur even without urination.  She was not having any vaginal itching, rash or discharge.  She does note some vaginal dryness and we had discussed application of coconut oil daily, which she states has helped with burning sensation.  She was relating feelings of incomplete bladder emptying saying that her bladder always felt full, as well as feelings of urgency/frequency and incontinence.  With the symptoms, did have some concern for possible interstitial cystitis so I did refer her to urology.  Urinalysis and urine culture at her last office visit were unremarkable and her urinalysis today is normal as well.  I did order a CT of the abdomen and pelvis following her last office visit which showed the floor of the urinary bladder to be below the level of the symphysis pubis which may suggest cystocele, which could explain feelings of fullness and incomplete bladder emptying.  I have referred her to gynecology to discuss options for cystocele.  I would like her to keep her appointment with urology as well as there is also a 1.2 cm low-density nodule of the right kidney noted.  This appears noncystic by CT criteria, radiologist recommends follow-up exam in 6 months with renal mass protocol.    The patient indicates having frequent bouts of diarrhea and fecal incontinence.  She also notes problems with nausea frequently and reflux.  She states reflux was actually not present until she started taking omeprazole as recommended by gastroenterology in 2023.  Her CT did show moderate thickening of the wall of the sigmoid colon and distal descending colon which is probably due to incomplete distention.  Possibility of acute nonspecific colitis could not be  excluded.  This did show moderate gas and stool seen throughout the colon with no findings to suggest obstruction.  The patient and I discussed that liquid stool can sometimes leak around solid stool in the colon, so even though she is having liquid bowel movements she still has solid stool noted in the colon.  I would like for her to trial a stool softener with Senokot as outlined above.  We also discussed increasing her water intake as she states she does not drink much water throughout the day.  She does try to eat high-fiber foods, and it is possible that this may be adding too much bulk to her stool.  Her last colonoscopy performed in 2021 showed 1 polyp in the transverse colon but otherwise normal.  Recommendations to repeat in 5 years.  Last upper GI endoscopy in February 2023 showed a small hiatal hernia and dilation of Schatzki ring.  Will switch reflux medication from omeprazole to Pepcid to see if this helps with reflux symptoms.  We discussed that constipation could be causing nausea and reflux symptoms.  She was on Ozempic for diabetes management but has stopped this nearly 1 year ago.  We did discuss the possibility of gastroparesis, however I would like to see if her symptoms improve following resolution of constipation prior to workup for gastroparesis and she is in agreement with this plan as well.  She has previously followed with gastroenterology for nonalcoholic fatty liver disease and states she is overdue to follow-up with them so I did encourage her to make an appointment with them for follow-up and it may be appropriate to discuss these symptoms as well.    Return in about 4 weeks (around 8/5/2024) for Recheck. unless patient needs to be seen sooner or acute issues arise.    I have discussed the patient results/orders and and plan/recommendation with them at today's visit.      Yuni Sanchez, ARNALDO   07/08/2024

## 2024-07-16 ENCOUNTER — OFFICE VISIT (OUTPATIENT)
Dept: UROLOGY | Facility: CLINIC | Age: 74
End: 2024-07-16
Payer: MEDICARE

## 2024-07-16 VITALS — WEIGHT: 167.8 LBS | TEMPERATURE: 97.4 F | BODY MASS INDEX: 32.94 KG/M2 | HEIGHT: 60 IN

## 2024-07-16 DIAGNOSIS — R30.0 DYSURIA: Primary | ICD-10-CM

## 2024-07-16 DIAGNOSIS — N95.2 ATROPHIC VAGINITIS: ICD-10-CM

## 2024-07-16 DIAGNOSIS — N39.41 URGE INCONTINENCE: ICD-10-CM

## 2024-07-16 DIAGNOSIS — N28.9 RENAL LESION: ICD-10-CM

## 2024-07-16 LAB
BILIRUB BLD-MCNC: NEGATIVE MG/DL
CLARITY, POC: CLEAR
COLOR UR: YELLOW
GLUCOSE UR STRIP-MCNC: NEGATIVE MG/DL
KETONES UR QL: NEGATIVE
LEUKOCYTE EST, POC: NEGATIVE
NITRITE UR-MCNC: NEGATIVE MG/ML
PH UR: 7 [PH] (ref 5–8)
PROT UR STRIP-MCNC: NEGATIVE MG/DL
RBC # UR STRIP: NEGATIVE /UL
SP GR UR: 1.01 (ref 1–1.03)
UROBILINOGEN UR QL: NORMAL

## 2024-07-16 RX ORDER — ESTRADIOL 0.1 MG/G
2 CREAM VAGINAL 3 TIMES WEEKLY
Qty: 42.5 G | Refills: 5 | Status: SHIPPED | OUTPATIENT
Start: 2024-07-17

## 2024-08-01 ENCOUNTER — OFFICE VISIT (OUTPATIENT)
Dept: INTERNAL MEDICINE | Facility: CLINIC | Age: 74
End: 2024-08-01
Payer: MEDICARE

## 2024-08-01 VITALS
DIASTOLIC BLOOD PRESSURE: 72 MMHG | OXYGEN SATURATION: 95 % | HEART RATE: 65 BPM | HEIGHT: 60 IN | BODY MASS INDEX: 32.55 KG/M2 | WEIGHT: 165.8 LBS | TEMPERATURE: 97.6 F | SYSTOLIC BLOOD PRESSURE: 144 MMHG

## 2024-08-01 DIAGNOSIS — I10 ESSENTIAL HYPERTENSION: Chronic | ICD-10-CM

## 2024-08-01 DIAGNOSIS — R06.00 PND (PAROXYSMAL NOCTURNAL DYSPNEA): ICD-10-CM

## 2024-08-01 DIAGNOSIS — K59.00 CONSTIPATION, UNSPECIFIED CONSTIPATION TYPE: ICD-10-CM

## 2024-08-01 DIAGNOSIS — G47.30 SLEEP APNEA, UNSPECIFIED TYPE: ICD-10-CM

## 2024-08-01 DIAGNOSIS — R40.0 DAYTIME SOMNOLENCE: Primary | ICD-10-CM

## 2024-08-01 PROCEDURE — 1126F AMNT PAIN NOTED NONE PRSNT: CPT | Performed by: NURSE PRACTITIONER

## 2024-08-01 PROCEDURE — 3077F SYST BP >= 140 MM HG: CPT | Performed by: NURSE PRACTITIONER

## 2024-08-01 PROCEDURE — 99214 OFFICE O/P EST MOD 30 MIN: CPT | Performed by: NURSE PRACTITIONER

## 2024-08-01 PROCEDURE — 1159F MED LIST DOCD IN RCRD: CPT | Performed by: NURSE PRACTITIONER

## 2024-08-01 PROCEDURE — 3078F DIAST BP <80 MM HG: CPT | Performed by: NURSE PRACTITIONER

## 2024-08-01 PROCEDURE — 3044F HG A1C LEVEL LT 7.0%: CPT | Performed by: NURSE PRACTITIONER

## 2024-08-01 PROCEDURE — 1160F RVW MEDS BY RX/DR IN RCRD: CPT | Performed by: NURSE PRACTITIONER

## 2024-08-01 RX ORDER — AMOXICILLIN 250 MG
1 CAPSULE ORAL 2 TIMES DAILY
Qty: 60 TABLET | Refills: 2 | Status: SHIPPED | OUTPATIENT
Start: 2024-08-01

## 2024-08-06 ENCOUNTER — OFFICE VISIT (OUTPATIENT)
Dept: GASTROENTEROLOGY | Facility: CLINIC | Age: 74
End: 2024-08-06
Payer: MEDICARE

## 2024-08-06 VITALS
OXYGEN SATURATION: 97 % | BODY MASS INDEX: 32.2 KG/M2 | HEIGHT: 60 IN | SYSTOLIC BLOOD PRESSURE: 126 MMHG | WEIGHT: 164 LBS | HEART RATE: 71 BPM | DIASTOLIC BLOOD PRESSURE: 80 MMHG | TEMPERATURE: 97.5 F

## 2024-08-06 DIAGNOSIS — R19.7 DIARRHEA, UNSPECIFIED TYPE: ICD-10-CM

## 2024-08-06 DIAGNOSIS — R13.19 ESOPHAGEAL DYSPHAGIA: Primary | ICD-10-CM

## 2024-08-06 DIAGNOSIS — K44.9 HIATAL HERNIA WITH GERD: ICD-10-CM

## 2024-08-06 DIAGNOSIS — R93.3 ABNORMAL FINDING ON GI TRACT IMAGING: ICD-10-CM

## 2024-08-06 DIAGNOSIS — K21.9 HIATAL HERNIA WITH GERD: ICD-10-CM

## 2024-08-06 DIAGNOSIS — K76.0 FATTY LIVER DISEASE, NONALCOHOLIC: ICD-10-CM

## 2024-08-06 DIAGNOSIS — Z86.010 HISTORY OF ADENOMATOUS POLYP OF COLON: ICD-10-CM

## 2024-08-06 PROCEDURE — 3079F DIAST BP 80-89 MM HG: CPT | Performed by: NURSE PRACTITIONER

## 2024-08-06 PROCEDURE — 1159F MED LIST DOCD IN RCRD: CPT | Performed by: NURSE PRACTITIONER

## 2024-08-06 PROCEDURE — 3074F SYST BP LT 130 MM HG: CPT | Performed by: NURSE PRACTITIONER

## 2024-08-06 PROCEDURE — 1160F RVW MEDS BY RX/DR IN RCRD: CPT | Performed by: NURSE PRACTITIONER

## 2024-08-06 PROCEDURE — 99214 OFFICE O/P EST MOD 30 MIN: CPT | Performed by: NURSE PRACTITIONER

## 2024-08-06 NOTE — PROGRESS NOTES
"Primary Physician: Eric Espinoza MD    Chief Complaint   Patient presents with    Follow-up     Pt presents today for fatty liver follow up-had labs for PCP 6/3/2024; Pt had CT 7/25/2024 for PCP-did show thickening in her colon and she would like to discuss further     Difficulty Swallowing     Pt does c/o trouble swallowing \"for a little while\" and feels like it's getting worse-feels like food/liquids/medications get hung in her throat-also states at time she gets \"strangled for no reason\"        Subjective     Pauline Molina is a 74 y.o. female.    HPI  Abnormal imaging  7/5/2024 CT scan of the abdomen pelvis revealing Moderate thickening of the wall of the poorly distended sigmoid colon. Moderate stool seen.  and distal descending colon which is probably due to incomplete  distention. Possibility of acute nonspecific colitis cannot be excluded.    Pt states she feels like she is having diarrhea. She describes the consistency as watery from time to time. At times she has solid stools. It can be loose in nature.  No blood seen in her stools.  No real abdominal pain. She did have left sided abdominal pain last month.    GERD  2/13/2023 Endoscopy small HH, non obstructing Schatzki ring at GEJ, dilation up to 18mm.  Pt had been on Omeprazole 40mg once daily however, she was having more heartburn so she was switched to Pepcid bid. That has settled her heartburn more so.  1/8/2024 Bone Density Study revealing osteoporosis.  6/3/2024 creatinine 0.81      Personal Hx Adenomatous Colon Polyps  Patient is up-to-date on her colonoscopy.  Last colonoscopy was June 19, 2021 with 1 tubular adenomatous polyp at the transverse colon.  5-year recall given.        Fatty Liver  Ultrasound of the liver collected 12/2/2022 revealed increased echogenicity of the liver suggesting steatosis.  No focal hepatic mass seen.   Labs 11/15/2022: ALKP 149, AST 44, ALT 28 and Bili 0.5  Pt does NOT drink any alcohol.     Pt reports " pancreatitis 12 years ago.  Had endoscopy with EUS by Dr Hastings 2010: normal pancreas, bile duct, and ampulla of vater.    6/3/2024 ALT/AST normal, bili 0.4, ALKP 207. Fib-4 Score 1.16  ..Fib-4 scoring system  Points <1.45:                      Cirrhosis less likely  Points ?1.45 and ?3.25:       Indeterminate  Points >3.25:                      Cirrhosis more likely    2024 CT Scan of the Abdomen/Pelvis: liver appeared normal.    Past Medical History:   Diagnosis Date    Arrhythmia     Family history of colonic polyps     GERD (gastroesophageal reflux disease)     History of adenomatous polyp of colon     History of colon polyps     Hyperlipidemia     Hypertension     MVP (mitral valve prolapse)     Pseudotumor cerebri     Type 2 diabetes mellitus        Past Surgical History:   Procedure Laterality Date    CARDIAC CATHETERIZATION      CATARACT EXTRACTION Right 08/15/2021    CATARACT EXTRACTION Left 2021     SECTION      CHOLECYSTECTOMY      COLONOSCOPY  2015    One 3-4mm hyperplastic polyp in the rectum; Repeat 5 years    COLONOSCOPY  2007    Normal; Repeat 3-4 years    COLONOSCOPY  2004    One 2cm pedunculated erythematous adenomatous polyp in the sigmoid colon; One 6mm hyperplastic polyp in the rectum; Repeat 3 years    COLONOSCOPY N/A 2021    One 7mm tubular adenomatous polyp in the transverse colon; The examination was otherwise normal on direct and retroflexion views; Repeat 5 years    DILATATION AND CURETTAGE      ENDOSCOPY  2009    Normal endoscopy    ENDOSCOPY  2004    GERD    ENDOSCOPY N/A 2023    Small HH; Non-obstructing Schatzki ring-Dilated; Normal stomach; Normal examined duodenum; No specimens collected    HERNIA REPAIR      HYSTERECTOMY      LAPAROSCOPIC LYSIS OF ADHESIONS      OOPHORECTOMY      OTHER SURGICAL HISTORY  2010    EUS-Dr. Hastings-Normal EUS evaluation of the pancreas, bile duct, and ampulla of Vater-pancreatitis  remains idiopathic        Current Outpatient Medications:     acetaminophen (TYLENOL) 325 MG tablet, Take 1 tablet by mouth As Needed., Disp: , Rfl:     amLODIPine (NORVASC) 2.5 MG tablet, Take 2 tablets by mouth 2 (Two) Times a Day., Disp: , Rfl:     Barberry-Oreg Grape-Goldenseal (BERBERINE COMPLEX PO), Take 1 capsule by mouth Daily., Disp: , Rfl:     Coenzyme Q10 (CoQ-10) 100 MG capsule, Take 1 capsule by mouth Daily., Disp: , Rfl:     estradiol (ESTRACE) 0.1 MG/GM vaginal cream, Insert 2 g into the vagina 3 (Three) Times a Week., Disp: 42.5 g, Rfl: 5    famotidine (Pepcid) 20 MG tablet, Take 1 tablet by mouth 2 (Two) Times a Day., Disp: 60 tablet, Rfl: 2    Fenugreek 500 MG capsule, Take 1 capsule by mouth Daily., Disp: , Rfl:     losartan (COZAAR) 100 MG tablet, Take 1 tablet by mouth once daily, Disp: 90 tablet, Rfl: 3    magnesium oxide (MAG-OX) 400 MG tablet, Take 1 tablet by mouth Daily., Disp: , Rfl:     metFORMIN ER (GLUCOPHAGE-XR) 500 MG 24 hr tablet, Take 1 tablet by mouth 2 (Two) Times a Day., Disp: , Rfl:     metoprolol tartrate (LOPRESSOR) 25 MG tablet, Take 1 tablet by mouth 2 (Two) Times a Day., Disp: 180 tablet, Rfl: 3    sennosides-docusate (Senokot S) 8.6-50 MG per tablet, Take 1 tablet by mouth 2 (Two) Times a Day. Take 1 tablet by mouth twice daily for 3 days, then decrease to 1 tablet once daily, Disp: 60 tablet, Rfl: 2    vitamin B-12 (CYANOCOBALAMIN) 1000 MCG tablet, Take 1 tablet by mouth Daily. Spray, Disp: , Rfl:     vitamin D3 125 MCG (5000 UT) capsule capsule, Take 1 capsule by mouth Daily., Disp: , Rfl:     Allergies   Allergen Reactions    Lactose Intolerance (Gi) GI Intolerance     Takes Lactaid       Social History     Socioeconomic History    Marital status:    Tobacco Use    Smoking status: Never    Smokeless tobacco: Never   Vaping Use    Vaping status: Never Used   Substance and Sexual Activity    Alcohol use: Never    Drug use: Never    Sexual activity: Defer  "      Family History   Problem Relation Age of Onset    Heart disease Mother     Prostate cancer Father     Colon polyps Father         > 60 years of age    Breast cancer Maternal Grandmother     Stomach cancer Paternal Grandmother 48    Colon cancer Neg Hx     Esophageal cancer Neg Hx     Liver cancer Neg Hx     Liver disease Neg Hx     Rectal cancer Neg Hx        Review of Systems   Constitutional:  Negative for fever.   Respiratory:  Negative for shortness of breath.    Cardiovascular:  Negative for chest pain.   Gastrointestinal:  Positive for abdominal distention and diarrhea.       Objective     /80 (BP Location: Left arm, Patient Position: Sitting, Cuff Size: Adult)   Pulse 71   Temp 97.5 °F (36.4 °C) (Infrared)   Ht 152.4 cm (60\")   Wt 74.4 kg (164 lb)   SpO2 97%   Breastfeeding No   BMI 32.03 kg/m²     Physical Exam  Vitals reviewed.   Constitutional:       Appearance: Normal appearance.   Cardiovascular:      Rate and Rhythm: Normal rate and regular rhythm.      Heart sounds: Normal heart sounds.   Pulmonary:      Effort: Pulmonary effort is normal.      Breath sounds: Normal breath sounds.   Abdominal:      General: Abdomen is flat.      Palpations: Abdomen is soft.      Tenderness: There is abdominal tenderness.      Comments: Tenderness in her left side of abdomen   Neurological:      Mental Status: She is alert.         Lab Results - Last 18 Months   Lab Units 07/05/24  0739 06/03/24  0913 04/05/24  1336 02/29/24  1330 11/30/23  1327 05/24/23  1028   GLUCOSE mg/dL  --  143*  --  104* 109* 99   BUN mg/dL  --  24*  --  26* 22 18   CREATININE mg/dL 1.00 0.81 0.80 0.96 0.79 0.71   SODIUM mmol/L  --  139  --  140 138 143   POTASSIUM mmol/L  --  4.8  --  4.3 5.2 4.4   CHLORIDE mmol/L  --  101  --  101 102 105   CO2 mmol/L  --  26.5  --  27.0 27.2 29.5*   ALBUMIN g/dL  --  4.4  --   --   --  4.6   ALT (SGPT) U/L  --  17  --   --   --  20   AST (SGOT) U/L  --  18  --   --   --  31   ALK PHOS U/L  " --  207*  --   --   --  178*   BILIRUBIN mg/dL  --  0.4  --   --   --  0.5   SED RATE mm/hr  --   --   --  3  --   --        Lab Results - Last 18 Months   Lab Units 06/03/24  0913 02/29/24  1330 05/24/23  1028   HEMOGLOBIN g/dL 14.5 15.2 14.8   HEMATOCRIT % 43.7 45.1 43.5   MCV fL 93.4 91.3 92.0   WBC 10*3/mm3 6.23 5.12 5.14   RDW % 13.6 13.5 12.9   PLATELETS 10*3/mm3 270 264 275       Lab Results - Last 18 Months   Lab Units 06/03/24  0913 05/24/23  1028 02/21/23  1351   TSH uIU/mL 2.090 2.240  --    VIT D 25 HYDROXY ng/ml  --   --  55.4        Narrative & Impression   EXAMINATION: CT ABDOMEN PELVIS W WO CONTRAST-      7/5/2024 6:45 AM     HISTORY: Questionable interstitial cystitis, feelings of incomplete  bladder emptying; N30.10-Interstitial cystitis (chronic) without  hematuria; R33.9-Retention of urine, unspecified     In order to have a CT radiation dose as low as reasonably achievable  Automated Exposure Control was utilized for adjustment of the mA and/or  KV according to patient size.     Total DLP = 1631.88 mGy.cm     The CT scan of the abdomen and pelvis is performed before and after  intravenous contrast enhancement.     Images are acquired in axial plane during corticomedullary,  nephrographic and urographic phases and subsequent reconstruction in  coronal and sagittal planes.     There is no previous similar study for comparison.     The lung bases included in the study appear unremarkable.     Limited visualized cardiomediastinal structures show atheromatous change  of the thoracic aorta and coronary arteries.     The liver and spleen are normal.     The gallbladder is surgically absent. There is no significant dilatation  of the common bile duct.     The pancreas is normal.     The adrenal glands bilaterally are normal.     There is a small relatively low-density exophytic nodule from the  lateral margin of the mid right kidney measuring 1.2 cm in diameter. CT  density is noncystic (38 HU). No  significant contrast enhancement. No  radiopaque calculi in either kidney. No hydronephrosis. The ureters are  normal and nondilated. Urinary bladder is decompressed and not optimally  evaluated. The floor of the urinary bladder is below the level of  symphysis pubis and may suggest cystocele which is probably due to  laxity of the floor of the pelvis.     There is evidence of lower anterior abdominal wall hernia repair  surgery. No complication.     Stomach is decompressed. No focal abnormality. Duodenum is normal. Small  bowel is nondistended. The appendix is not visualized. There are no  finding to suggest appendicitis. Moderate gas and stool is seen  throughout the colon. No finding to suggest obstruction. Moderate  diffuse thickening of the wall of the the distal descending colon and  sigmoid colon is seen. This is probably due to incomplete distention. No  surrounding peritoneal infiltration.     Atheromatous changes of the abdominal aorta and iliac arteries. No  aneurysmal dilatation.     There is no evidence of abdominal or pelvic lymphadenopathy.     The images reviewed in bone window show moderate chronic degenerative  changes of the lumbar spine. No acute bony abnormality.     IMPRESSION:  1. The urinary bladder is decompressed and not optimally visualized or  evaluated in this study.  2. A 1.2 cm low density exophytic nodule which is noncystic by CT  criterion and shows no enhancement. This may represent a cyst with high  proteinaceous contents or a solid nodule. A follow-up examination in 6  months is recommended with renal mass protocol for further evaluation.  3. Moderate thickening of the wall of the poorly distended sigmoid colon  and distal descending colon which is probably due to incomplete  distention. Possibility of acute nonspecific colitis cannot be excluded.  4. Other nonacute findings as above.     This report was signed and finalized on 7/5/2024 8:22 AM by Dr. Angeli Garcia MD.             IMPRESSION/PLAN:    Assessment & Plan      Problem List Items Addressed This Visit       Fatty liver disease, nonalcoholic    Overview     Ultrasound of the liver collected 12/2/2022 revealed increased echogenicity of the liver suggesting steatosis.  No focal hepatic mass seen.   No alcohol  7/5/2024 CT Scan of the Abdomen/Pelvis: liver appeared normal.  6/3/2024 ALT/AST normal, bili 0.4, ALKP 207. Fib-4 Score 1.16  ..Fib-4 scoring system  Points <1.45:                      Cirrhosis less likely  Points ?1.45 and ?3.25:       Indeterminate  Points >3.25:                      Cirrhosis more likely         History of adenomatous polyp of colon    Overview     Last colonoscopy 6/19/2021: Adenomatous polyp of the transverse colon, 5-year recall         Esophageal dysphagia - Primary    Overview     Dysphagia with foods, liquids and pills    Endoscopy 2/2023 shows small hiatal hernia with Schatzki's ring dilated to 18 mm.             Relevant Orders    Case Request (Completed)    Hiatal hernia with GERD    Overview     Endoscopy 2/2023 does not show esophagitis.  Small hiatal hernia with Schatzki's ring dilated to 18 mm done.  Started on Omeprazole 40 mg daily and recently started having increased reflux over past couple months so switched to Pepcid BID and that has helped.    Anti-reflux measures were reviewed and discussed with patient.  Pt advised to refrain from chocolate, alcohol, smoking, peppermint and caffeine.  Also advised to limit fatty foods, large meals, and eating late at nighttime.  Advised to reach an ideal body mass index.         Abnormal finding on GI tract imaging    Relevant Orders    Case Request (Completed)    Diarrhea    Relevant Orders    Case Request (Completed)    Clostridioides difficile Toxin, PCR - Stool, Per Rectum    Stool Culture (Reference Lab) - Stool, Per Rectum     Endoscopy and Colonoscopy per Dr Julio Prieto Prep  Check stool studies          ..The risks, benefits, and  alternatives of colonoscopy were reviewed with the patient today.  Risks including perforation of the colon possibly requiring surgery or colostomy.  Additional risks include risk of bleeding from biopsies or removal of colon tissue.  There is also the risk of a drug reaction or problems with anesthesia.  This will be discussed with the further by the anesthesia team on the day of the procedure.  Lastly there is a possibility of missing a colon polyp or cancer.  The benefits include the diagnosis and management of disease of the colon and rectum.  Alternatives to colonoscopy include barium enema, laboratory testing, radiographic evaluation, or no intervention.  The patient verbalizes understanding and agrees.    In accordance with requirements under the Affordable Care Act, [facility] has provided pricing for all hospital services and items on each of its websites. However, a patient's actual cost may differ based on the services the patient receives to meet individual healthcare needs and based on the benefits provided under the patient’s insurance coverage.        Genna Martinez, APRN  08/06/24  10:22 CDT    Part of this note may be an electronic transcription/translation of spoken language to printed text.

## 2024-08-09 ENCOUNTER — LAB (OUTPATIENT)
Dept: LAB | Facility: HOSPITAL | Age: 74
End: 2024-08-09
Payer: MEDICARE

## 2024-08-09 DIAGNOSIS — R19.7 DIARRHEA, UNSPECIFIED TYPE: ICD-10-CM

## 2024-08-09 LAB — C DIFF TOX GENS STL QL NAA+PROBE: NEGATIVE

## 2024-08-09 PROCEDURE — 87427 SHIGA-LIKE TOXIN AG IA: CPT

## 2024-08-09 PROCEDURE — 87046 STOOL CULTR AEROBIC BACT EA: CPT

## 2024-08-09 PROCEDURE — 87045 FECES CULTURE AEROBIC BACT: CPT

## 2024-08-09 PROCEDURE — 87493 C DIFF AMPLIFIED PROBE: CPT

## 2024-08-14 ENCOUNTER — TELEPHONE (OUTPATIENT)
Dept: NEUROLOGY | Facility: CLINIC | Age: 74
End: 2024-08-14
Payer: MEDICARE

## 2024-08-14 NOTE — TELEPHONE ENCOUNTER
Detailed voicemail left regarding appt cancellationon 9/3. New appt made & will be mailed.   Onset: 30 minutes ago   Location / description: [place] rubbed her right eye post \"CATARACT\" surgery about 30 minutes ago, she just put her eye patch on at this time, and she did apply her eye drops, she denies any issues it just feels like she may be starting with a headache.  Precipitating Factors:   Procedure Laterality Anesthesia   RIGHT EXTRACTION, , WITH IOL INSERTION       Pain Scale (1-10), 10 highest: 2/10 little sore   Associated Symptoms: denies any additional symptoms  What improves/worsens symptoms:   Symptom specific medications: nothing/nothing  LMP : Patient's last menstrual period was 01/08/2002.  Are you pregnant or breast feeding: n/a  Recent Care: Date: 11/30/2020  Panel 1  Surgeon Role Start Time End Time   Barrie Rivera MD             Did the patient have a positive coronavirus screening?: No    PLAN:  Home Care Advice provided    Patient/Caller agrees to follow recommendations.    She does have an appointment tomorrow for follow-up.   Advised patient to call back with additional questions/concerns.  Patient verbalized understanding and agreed with the plan.      Reason for Disposition  • Other post-op symptom or question (all triage questions negative)    Protocols used: POST-OP SYMPTOMS AND [id number]

## 2024-08-16 LAB
BACTERIA SPEC CULT: NORMAL
BACTERIA SPEC CULT: NORMAL
CAMPYLOBACTER STL CULT: NORMAL
E COLI SXT STL QL IA: NEGATIVE
SALM + SHIG STL CULT: NORMAL

## 2024-08-18 ENCOUNTER — APPOINTMENT (OUTPATIENT)
Dept: GENERAL RADIOLOGY | Facility: HOSPITAL | Age: 74
End: 2024-08-18
Payer: MEDICARE

## 2024-08-18 ENCOUNTER — HOSPITAL ENCOUNTER (OUTPATIENT)
Facility: HOSPITAL | Age: 74
Setting detail: OBSERVATION
Discharge: HOME | End: 2024-08-19
Attending: EMERGENCY MEDICINE | Admitting: FAMILY MEDICINE
Payer: MEDICARE

## 2024-08-18 ENCOUNTER — APPOINTMENT (OUTPATIENT)
Dept: CT IMAGING | Facility: HOSPITAL | Age: 74
End: 2024-08-18
Payer: MEDICARE

## 2024-08-18 DIAGNOSIS — S42.355A CLOSED NONDISPLACED COMMINUTED FRACTURE OF SHAFT OF LEFT HUMERUS, INITIAL ENCOUNTER: ICD-10-CM

## 2024-08-18 DIAGNOSIS — S42.302A CLOSED FRACTURE OF SHAFT OF LEFT HUMERUS, UNSPECIFIED FRACTURE MORPHOLOGY, INITIAL ENCOUNTER: ICD-10-CM

## 2024-08-18 DIAGNOSIS — S42.352A CLOSED DISPLACED COMMINUTED FRACTURE OF SHAFT OF LEFT HUMERUS, INITIAL ENCOUNTER: Primary | ICD-10-CM

## 2024-08-18 PROBLEM — I10 ESSENTIAL HYPERTENSION: Chronic | Status: ACTIVE | Noted: 2024-08-18

## 2024-08-18 PROBLEM — S42.309A HUMERAL SHAFT FRACTURE: Status: ACTIVE | Noted: 2024-08-18

## 2024-08-18 LAB
ABO GROUP BLD: NORMAL
ALBUMIN SERPL-MCNC: 4.2 G/DL (ref 3.5–5.2)
ALBUMIN/GLOB SERPL: 1.6 G/DL
ALP SERPL-CCNC: 156 U/L (ref 39–117)
ALT SERPL W P-5'-P-CCNC: 17 U/L (ref 1–33)
ANION GAP SERPL CALCULATED.3IONS-SCNC: 13 MMOL/L (ref 5–15)
APTT PPP: 28.2 SECONDS (ref 24.5–36)
AST SERPL-CCNC: 41 U/L (ref 1–32)
BASOPHILS # BLD AUTO: 0.03 10*3/MM3 (ref 0–0.2)
BASOPHILS # BLD AUTO: 0.04 10*3/MM3 (ref 0–0.2)
BASOPHILS NFR BLD AUTO: 0.3 % (ref 0–1.5)
BASOPHILS NFR BLD AUTO: 0.4 % (ref 0–1.5)
BILIRUB SERPL-MCNC: 0.2 MG/DL (ref 0–1.2)
BLD GP AB SCN SERPL QL: NEGATIVE
BUN SERPL-MCNC: 23 MG/DL (ref 8–23)
BUN/CREAT SERPL: 31.5 (ref 7–25)
CALCIUM SPEC-SCNC: 9 MG/DL (ref 8.6–10.5)
CHLORIDE SERPL-SCNC: 100 MMOL/L (ref 98–107)
CO2 SERPL-SCNC: 23 MMOL/L (ref 22–29)
CREAT SERPL-MCNC: 0.73 MG/DL (ref 0.57–1)
DEPRECATED RDW RBC AUTO: 42.8 FL (ref 37–54)
DEPRECATED RDW RBC AUTO: 43.2 FL (ref 37–54)
EGFRCR SERPLBLD CKD-EPI 2021: 87.5 ML/MIN/1.73
EOSINOPHIL # BLD AUTO: 0.03 10*3/MM3 (ref 0–0.4)
EOSINOPHIL # BLD AUTO: 0.15 10*3/MM3 (ref 0–0.4)
EOSINOPHIL NFR BLD AUTO: 0.3 % (ref 0.3–6.2)
EOSINOPHIL NFR BLD AUTO: 1.4 % (ref 0.3–6.2)
ERYTHROCYTE [DISTWIDTH] IN BLOOD BY AUTOMATED COUNT: 13.1 % (ref 12.3–15.4)
ERYTHROCYTE [DISTWIDTH] IN BLOOD BY AUTOMATED COUNT: 13.2 % (ref 12.3–15.4)
GLOBULIN UR ELPH-MCNC: 2.7 GM/DL
GLUCOSE BLDC GLUCOMTR-MCNC: 127 MG/DL (ref 70–130)
GLUCOSE BLDC GLUCOMTR-MCNC: 183 MG/DL (ref 70–130)
GLUCOSE BLDC GLUCOMTR-MCNC: 243 MG/DL (ref 70–130)
GLUCOSE SERPL-MCNC: 253 MG/DL (ref 65–99)
HCT VFR BLD AUTO: 35.7 % (ref 34–46.6)
HCT VFR BLD AUTO: 39.3 % (ref 34–46.6)
HGB BLD-MCNC: 12.4 G/DL (ref 12–15.9)
HGB BLD-MCNC: 14.1 G/DL (ref 12–15.9)
IMM GRANULOCYTES # BLD AUTO: 0.04 10*3/MM3 (ref 0–0.05)
IMM GRANULOCYTES # BLD AUTO: 0.05 10*3/MM3 (ref 0–0.05)
IMM GRANULOCYTES NFR BLD AUTO: 0.4 % (ref 0–0.5)
IMM GRANULOCYTES NFR BLD AUTO: 0.4 % (ref 0–0.5)
INR PPP: 0.86 (ref 0.91–1.09)
LYMPHOCYTES # BLD AUTO: 1.27 10*3/MM3 (ref 0.7–3.1)
LYMPHOCYTES # BLD AUTO: 1.38 10*3/MM3 (ref 0.7–3.1)
LYMPHOCYTES NFR BLD AUTO: 10.9 % (ref 19.6–45.3)
LYMPHOCYTES NFR BLD AUTO: 13.3 % (ref 19.6–45.3)
MAGNESIUM SERPL-MCNC: 2 MG/DL (ref 1.6–2.4)
MCH RBC QN AUTO: 31.5 PG (ref 26.6–33)
MCH RBC QN AUTO: 32.3 PG (ref 26.6–33)
MCHC RBC AUTO-ENTMCNC: 34.7 G/DL (ref 31.5–35.7)
MCHC RBC AUTO-ENTMCNC: 35.9 G/DL (ref 31.5–35.7)
MCV RBC AUTO: 90.1 FL (ref 79–97)
MCV RBC AUTO: 90.6 FL (ref 79–97)
MONOCYTES # BLD AUTO: 0.77 10*3/MM3 (ref 0.1–0.9)
MONOCYTES # BLD AUTO: 0.8 10*3/MM3 (ref 0.1–0.9)
MONOCYTES NFR BLD AUTO: 6.9 % (ref 5–12)
MONOCYTES NFR BLD AUTO: 7.4 % (ref 5–12)
NEUTROPHILS NFR BLD AUTO: 77.1 % (ref 42.7–76)
NEUTROPHILS NFR BLD AUTO: 8.01 10*3/MM3 (ref 1.7–7)
NEUTROPHILS NFR BLD AUTO: 81.2 % (ref 42.7–76)
NEUTROPHILS NFR BLD AUTO: 9.48 10*3/MM3 (ref 1.7–7)
NRBC BLD AUTO-RTO: 0 /100 WBC (ref 0–0.2)
NRBC BLD AUTO-RTO: 0 /100 WBC (ref 0–0.2)
PLATELET # BLD AUTO: 258 10*3/MM3 (ref 140–450)
PLATELET # BLD AUTO: 274 10*3/MM3 (ref 140–450)
PMV BLD AUTO: 10 FL (ref 6–12)
PMV BLD AUTO: 10.3 FL (ref 6–12)
POTASSIUM SERPL-SCNC: 5.1 MMOL/L (ref 3.5–5.2)
PROT SERPL-MCNC: 6.9 G/DL (ref 6–8.5)
PROTHROMBIN TIME: 12 SECONDS (ref 11.8–14.8)
QT INTERVAL: 398 MS
QTC INTERVAL: 470 MS
RBC # BLD AUTO: 3.94 10*6/MM3 (ref 3.77–5.28)
RBC # BLD AUTO: 4.36 10*6/MM3 (ref 3.77–5.28)
RH BLD: NEGATIVE
SODIUM SERPL-SCNC: 136 MMOL/L (ref 136–145)
T&S EXPIRATION DATE: NORMAL
WBC NRBC COR # BLD AUTO: 10.39 10*3/MM3 (ref 3.4–10.8)
WBC NRBC COR # BLD AUTO: 11.66 10*3/MM3 (ref 3.4–10.8)

## 2024-08-18 PROCEDURE — 25810000003 LACTATED RINGERS PER 1000 ML: Performed by: INTERNAL MEDICINE

## 2024-08-18 PROCEDURE — 86901 BLOOD TYPING SEROLOGIC RH(D): CPT | Performed by: EMERGENCY MEDICINE

## 2024-08-18 PROCEDURE — 96376 TX/PRO/DX INJ SAME DRUG ADON: CPT

## 2024-08-18 PROCEDURE — 96374 THER/PROPH/DIAG INJ IV PUSH: CPT

## 2024-08-18 PROCEDURE — 85730 THROMBOPLASTIN TIME PARTIAL: CPT | Performed by: EMERGENCY MEDICINE

## 2024-08-18 PROCEDURE — G0378 HOSPITAL OBSERVATION PER HR: HCPCS

## 2024-08-18 PROCEDURE — 99285 EMERGENCY DEPT VISIT HI MDM: CPT

## 2024-08-18 PROCEDURE — 63710000001 INSULIN REGULAR HUMAN PER 5 UNITS: Performed by: INTERNAL MEDICINE

## 2024-08-18 PROCEDURE — 96361 HYDRATE IV INFUSION ADD-ON: CPT

## 2024-08-18 PROCEDURE — 25010000002 HYDROMORPHONE PER 4 MG: Performed by: INTERNAL MEDICINE

## 2024-08-18 PROCEDURE — 72125 CT NECK SPINE W/O DYE: CPT

## 2024-08-18 PROCEDURE — 82948 REAGENT STRIP/BLOOD GLUCOSE: CPT

## 2024-08-18 PROCEDURE — 73030 X-RAY EXAM OF SHOULDER: CPT

## 2024-08-18 PROCEDURE — 85025 COMPLETE CBC W/AUTO DIFF WBC: CPT | Performed by: EMERGENCY MEDICINE

## 2024-08-18 PROCEDURE — 25010000002 HYDROMORPHONE PER 4 MG: Performed by: EMERGENCY MEDICINE

## 2024-08-18 PROCEDURE — 70450 CT HEAD/BRAIN W/O DYE: CPT

## 2024-08-18 PROCEDURE — 96375 TX/PRO/DX INJ NEW DRUG ADDON: CPT

## 2024-08-18 PROCEDURE — 85610 PROTHROMBIN TIME: CPT | Performed by: EMERGENCY MEDICINE

## 2024-08-18 PROCEDURE — 93005 ELECTROCARDIOGRAM TRACING: CPT | Performed by: EMERGENCY MEDICINE

## 2024-08-18 PROCEDURE — 86850 RBC ANTIBODY SCREEN: CPT | Performed by: EMERGENCY MEDICINE

## 2024-08-18 PROCEDURE — 85025 COMPLETE CBC W/AUTO DIFF WBC: CPT | Performed by: INTERNAL MEDICINE

## 2024-08-18 PROCEDURE — 73060 X-RAY EXAM OF HUMERUS: CPT

## 2024-08-18 PROCEDURE — 36415 COLL VENOUS BLD VENIPUNCTURE: CPT

## 2024-08-18 PROCEDURE — 80053 COMPREHEN METABOLIC PANEL: CPT | Performed by: EMERGENCY MEDICINE

## 2024-08-18 PROCEDURE — 83735 ASSAY OF MAGNESIUM: CPT

## 2024-08-18 PROCEDURE — 86900 BLOOD TYPING SEROLOGIC ABO: CPT | Performed by: EMERGENCY MEDICINE

## 2024-08-18 RX ORDER — VITAMIN B COMPLEX
1 TABLET ORAL DAILY
COMMUNITY

## 2024-08-18 RX ORDER — HYDROMORPHONE HYDROCHLORIDE 1 MG/ML
0.5 INJECTION, SOLUTION INTRAMUSCULAR; INTRAVENOUS; SUBCUTANEOUS
Status: DISCONTINUED | OUTPATIENT
Start: 2024-08-18 | End: 2024-08-18

## 2024-08-18 RX ORDER — AMOXICILLIN 250 MG
2 CAPSULE ORAL 2 TIMES DAILY PRN
Status: DISCONTINUED | OUTPATIENT
Start: 2024-08-18 | End: 2024-08-19 | Stop reason: HOSPADM

## 2024-08-18 RX ORDER — NICOTINE POLACRILEX 4 MG
15 LOZENGE BUCCAL
Status: DISCONTINUED | OUTPATIENT
Start: 2024-08-18 | End: 2024-08-19 | Stop reason: HOSPADM

## 2024-08-18 RX ORDER — MULTIVIT WITH MINERALS/LUTEIN
250 TABLET ORAL DAILY
COMMUNITY

## 2024-08-18 RX ORDER — SODIUM CHLORIDE 0.9 % (FLUSH) 0.9 %
10 SYRINGE (ML) INJECTION EVERY 12 HOURS SCHEDULED
Status: DISCONTINUED | OUTPATIENT
Start: 2024-08-18 | End: 2024-08-19 | Stop reason: HOSPADM

## 2024-08-18 RX ORDER — HYDROMORPHONE HYDROCHLORIDE 1 MG/ML
0.25 INJECTION, SOLUTION INTRAMUSCULAR; INTRAVENOUS; SUBCUTANEOUS ONCE
Status: COMPLETED | OUTPATIENT
Start: 2024-08-18 | End: 2024-08-18

## 2024-08-18 RX ORDER — SODIUM CHLORIDE 9 MG/ML
40 INJECTION, SOLUTION INTRAVENOUS AS NEEDED
Status: DISCONTINUED | OUTPATIENT
Start: 2024-08-18 | End: 2024-08-19 | Stop reason: HOSPADM

## 2024-08-18 RX ORDER — CYCLOBENZAPRINE HCL 10 MG
5 TABLET ORAL 3 TIMES DAILY PRN
Status: DISCONTINUED | OUTPATIENT
Start: 2024-08-18 | End: 2024-08-19 | Stop reason: HOSPADM

## 2024-08-18 RX ORDER — OXYCODONE AND ACETAMINOPHEN 7.5; 325 MG/1; MG/1
1 TABLET ORAL EVERY 4 HOURS PRN
Status: DISCONTINUED | OUTPATIENT
Start: 2024-08-18 | End: 2024-08-18

## 2024-08-18 RX ORDER — SODIUM CHLORIDE 0.9 % (FLUSH) 0.9 %
10 SYRINGE (ML) INJECTION AS NEEDED
Status: DISCONTINUED | OUTPATIENT
Start: 2024-08-18 | End: 2024-08-19 | Stop reason: HOSPADM

## 2024-08-18 RX ORDER — AMLODIPINE BESYLATE 5 MG/1
5 TABLET ORAL
Status: DISCONTINUED | OUTPATIENT
Start: 2024-08-18 | End: 2024-08-19 | Stop reason: HOSPADM

## 2024-08-18 RX ORDER — BISACODYL 5 MG/1
5 TABLET, DELAYED RELEASE ORAL DAILY PRN
Status: DISCONTINUED | OUTPATIENT
Start: 2024-08-18 | End: 2024-08-19 | Stop reason: HOSPADM

## 2024-08-18 RX ORDER — IBUPROFEN 600 MG/1
1 TABLET ORAL
Status: DISCONTINUED | OUTPATIENT
Start: 2024-08-18 | End: 2024-08-19 | Stop reason: HOSPADM

## 2024-08-18 RX ORDER — POLYETHYLENE GLYCOL 3350 17 G/17G
17 POWDER, FOR SOLUTION ORAL DAILY PRN
Status: DISCONTINUED | OUTPATIENT
Start: 2024-08-18 | End: 2024-08-19 | Stop reason: HOSPADM

## 2024-08-18 RX ORDER — HYDROCODONE BITARTRATE AND ACETAMINOPHEN 5; 325 MG/1; MG/1
1 TABLET ORAL EVERY 6 HOURS PRN
Status: DISCONTINUED | OUTPATIENT
Start: 2024-08-18 | End: 2024-08-19 | Stop reason: HOSPADM

## 2024-08-18 RX ORDER — METOPROLOL TARTRATE 25 MG/1
25 TABLET, FILM COATED ORAL 2 TIMES DAILY
Status: DISCONTINUED | OUTPATIENT
Start: 2024-08-18 | End: 2024-08-18

## 2024-08-18 RX ORDER — METOPROLOL SUCCINATE 25 MG/1
25 TABLET, EXTENDED RELEASE ORAL
Status: DISCONTINUED | OUTPATIENT
Start: 2024-08-18 | End: 2024-08-19 | Stop reason: HOSPADM

## 2024-08-18 RX ORDER — ACETAMINOPHEN 325 MG/1
650 TABLET ORAL EVERY 4 HOURS PRN
Status: DISCONTINUED | OUTPATIENT
Start: 2024-08-18 | End: 2024-08-19 | Stop reason: HOSPADM

## 2024-08-18 RX ORDER — DEXTROSE MONOHYDRATE 25 G/50ML
25 INJECTION, SOLUTION INTRAVENOUS
Status: DISCONTINUED | OUTPATIENT
Start: 2024-08-18 | End: 2024-08-19 | Stop reason: HOSPADM

## 2024-08-18 RX ORDER — BISACODYL 10 MG
10 SUPPOSITORY, RECTAL RECTAL DAILY PRN
Status: DISCONTINUED | OUTPATIENT
Start: 2024-08-18 | End: 2024-08-19 | Stop reason: HOSPADM

## 2024-08-18 RX ORDER — ONDANSETRON 2 MG/ML
4 INJECTION INTRAMUSCULAR; INTRAVENOUS EVERY 6 HOURS PRN
Status: DISCONTINUED | OUTPATIENT
Start: 2024-08-18 | End: 2024-08-19 | Stop reason: HOSPADM

## 2024-08-18 RX ORDER — FAMOTIDINE 10 MG/ML
20 INJECTION, SOLUTION INTRAVENOUS EVERY 12 HOURS SCHEDULED
Status: DISCONTINUED | OUTPATIENT
Start: 2024-08-18 | End: 2024-08-19 | Stop reason: HOSPADM

## 2024-08-18 RX ORDER — AMLODIPINE BESYLATE 2.5 MG/1
2.5 TABLET ORAL 2 TIMES DAILY
COMMUNITY
End: 2024-08-19 | Stop reason: HOSPADM

## 2024-08-18 RX ORDER — TRAMADOL HYDROCHLORIDE 50 MG/1
50 TABLET ORAL EVERY 6 HOURS PRN
Status: DISCONTINUED | OUTPATIENT
Start: 2024-08-18 | End: 2024-08-18

## 2024-08-18 RX ORDER — SODIUM CHLORIDE, SODIUM LACTATE, POTASSIUM CHLORIDE, CALCIUM CHLORIDE 600; 310; 30; 20 MG/100ML; MG/100ML; MG/100ML; MG/100ML
75 INJECTION, SOLUTION INTRAVENOUS CONTINUOUS
Status: DISCONTINUED | OUTPATIENT
Start: 2024-08-18 | End: 2024-08-18

## 2024-08-18 RX ORDER — HYDRALAZINE HYDROCHLORIDE 20 MG/ML
10 INJECTION INTRAMUSCULAR; INTRAVENOUS EVERY 6 HOURS PRN
Status: DISCONTINUED | OUTPATIENT
Start: 2024-08-18 | End: 2024-08-19 | Stop reason: HOSPADM

## 2024-08-18 RX ADMIN — Medication 10 ML: at 08:41

## 2024-08-18 RX ADMIN — FAMOTIDINE 20 MG: 10 INJECTION INTRAVENOUS at 08:41

## 2024-08-18 RX ADMIN — AMLODIPINE BESYLATE 5 MG: 5 TABLET ORAL at 12:39

## 2024-08-18 RX ADMIN — HYDROCODONE BITARTRATE AND ACETAMINOPHEN 1 TABLET: 5; 325 TABLET ORAL at 15:24

## 2024-08-18 RX ADMIN — POLYETHYLENE GLYCOL 3350 17 G: 17 POWDER, FOR SOLUTION ORAL at 09:30

## 2024-08-18 RX ADMIN — HYDROCODONE BITARTRATE AND ACETAMINOPHEN 1 TABLET: 5; 325 TABLET ORAL at 21:29

## 2024-08-18 RX ADMIN — FAMOTIDINE 20 MG: 10 INJECTION INTRAVENOUS at 20:44

## 2024-08-18 RX ADMIN — HYDROMORPHONE HYDROCHLORIDE 0.5 MG: 1 INJECTION, SOLUTION INTRAMUSCULAR; INTRAVENOUS; SUBCUTANEOUS at 06:36

## 2024-08-18 RX ADMIN — TRAMADOL HYDROCHLORIDE 50 MG: 50 TABLET ORAL at 10:43

## 2024-08-18 RX ADMIN — Medication 10 ML: at 20:44

## 2024-08-18 RX ADMIN — CYCLOBENZAPRINE HYDROCHLORIDE 5 MG: 10 TABLET, FILM COATED ORAL at 21:28

## 2024-08-18 RX ADMIN — METOPROLOL SUCCINATE 25 MG: 25 TABLET, EXTENDED RELEASE ORAL at 12:39

## 2024-08-18 RX ADMIN — DOCUSATE SODIUM 50 MG AND SENNOSIDES 8.6 MG 2 TABLET: 8.6; 5 TABLET, FILM COATED ORAL at 09:30

## 2024-08-18 RX ADMIN — SODIUM CHLORIDE, POTASSIUM CHLORIDE, SODIUM LACTATE AND CALCIUM CHLORIDE 75 ML/HR: 600; 310; 30; 20 INJECTION, SOLUTION INTRAVENOUS at 04:47

## 2024-08-18 RX ADMIN — INSULIN HUMAN 3 UNITS: 100 INJECTION, SOLUTION PARENTERAL at 06:58

## 2024-08-18 RX ADMIN — HYDROMORPHONE HYDROCHLORIDE 0.25 MG: 1 INJECTION, SOLUTION INTRAMUSCULAR; INTRAVENOUS; SUBCUTANEOUS at 03:31

## 2024-08-18 RX ADMIN — INSULIN HUMAN 2 UNITS: 100 INJECTION, SOLUTION PARENTERAL at 12:48

## 2024-08-18 RX ADMIN — HYDROMORPHONE HYDROCHLORIDE 0.25 MG: 1 INJECTION, SOLUTION INTRAMUSCULAR; INTRAVENOUS; SUBCUTANEOUS at 01:07

## 2024-08-18 NOTE — PROGRESS NOTES
Patient Name: Pauline Molina  Date of Admission: 8/18/2024  Today's Date: 08/18/24  Length of Stay: 0  Primary Care Physician: Eric Espinoza MD    Subjective   Chief Complaint: Arm pain  Fall  Pertinent negatives include no nausea or vomiting.      Pauline Molina is a 74-year-old female with past medical history of hypertension, obesity, type 2 diabetes, headaches, high glycerides, panic attacks, anxiety, esophageal dysphagia, elevated LFTs, and valve prolapse.  Patient presents to [facility] emergency department on 8/18/2024 after a fall stumbling over her grandchildren's toys in the living room.  No reports of loss of consciousness or head injury.  X-rays revealed a X-rays demonstrated a long oblique comminuted humeral fracture of the left side.  CT of the head and cervical spine were negative for acute changes and labs were unremarkable.  Patient was admitted for overnight observation and consultation with orthopedic surgery in the a.m.    Today:   Patient is resting comfortably in bed on room air with her  and friend at bedside.  Patient is alert and oriented and able to participate in assessment and history.  Patient states her pain is significantly improved overnight however she is can extremely concerned about having to take narcotics due to her pain.  We will give patient a trial of Ultram overnight to see if it is sufficient for her pain prior to initiating Norco. Dr. Kent, orthopedic surgery evaluated the patient with recommendations for placement of a over the shoulder Bosch fracture orthosis in the morning with follow-up in 10 to 14 days.  No surgical intervention at this time.  Patient has no additional complaints of nausea, vomiting, diarrhea or shortness of breath.  Does have chronic leg cramps relieved with magnesium spray, check magnesium level this morning and it was 2.0, patient and family updated.  All patient's questions were answered to the best my ability and patient and family  are in agreement for discharge home tomorrow after orthotic is placed.  Documented weights    08/18/24 0036 08/18/24 0425   Weight: 74.4 kg (164 lb) 75 kg (165 lb 4.8 oz)        Review of Systems   Constitutional:  Positive for fatigue.   Respiratory:  Negative for shortness of breath.    Cardiovascular:  Negative for chest pain.   Gastrointestinal:  Negative for diarrhea, nausea and vomiting.   Musculoskeletal:  Positive for arthralgias and joint swelling.   Neurological:  Positive for weakness. Negative for dizziness.      All pertinent negatives and positives are as above. All other systems have been reviewed and are negative unless otherwise stated.     Objective    Temp:  [98.1 °F (36.7 °C)-98.9 °F (37.2 °C)] 98.2 °F (36.8 °C)  Heart Rate:  [80-86] 80  Resp:  [18-20] 18  BP: (134-158)/(61-90) 158/66  Physical Exam  Vitals and nursing note reviewed.   Constitutional:       Appearance: She is obese.      Comments: Patient is resting comfortably in bed on room air with her  and friend at bedside   HENT:      Right Ear: Tympanic membrane normal.      Nose: Nose normal.      Mouth/Throat:      Mouth: Mucous membranes are moist.      Pharynx: Oropharynx is clear.   Eyes:      Pupils: Pupils are equal, round, and reactive to light.   Cardiovascular:      Rate and Rhythm: Normal rate and regular rhythm.      Pulses: Normal pulses.      Heart sounds: Normal heart sounds.   Pulmonary:      Effort: No tachypnea, accessory muscle usage or respiratory distress.      Breath sounds: Examination of the right-middle field reveals decreased breath sounds. Examination of the left-middle field reveals decreased breath sounds. Examination of the right-lower field reveals decreased breath sounds. Examination of the left-lower field reveals decreased breath sounds. Decreased breath sounds present.   Abdominal:      General: Abdomen is protuberant. Bowel sounds are normal.      Tenderness: There is no abdominal tenderness.  "  Genitourinary:     Comments: Voiding per bathroom privileges  Musculoskeletal:      Cervical back: Normal range of motion.      Comments: Left arm tenderness, no numbness or tingling present in left hand.  Patient additionally has chronic right leg cramps   Skin:     General: Skin is warm.      Capillary Refill: Capillary refill takes less than 2 seconds.      Coloration: Skin is pale.   Neurological:      General: No focal deficit present.   Psychiatric:         Mood and Affect: Mood normal.         Speech: Speech is tangential.         Behavior: Behavior normal. Behavior is cooperative.         Thought Content: Thought content normal.         Judgment: Judgment normal.         Results Review:  I have reviewed the labs, radiology results, and diagnostic studies.    Laboratory Data:   Results from last 7 days   Lab Units 08/18/24  0438 08/18/24  0107   WBC 10*3/mm3 11.66* 10.39   HEMOGLOBIN g/dL 12.4 14.1   HEMATOCRIT % 35.7 39.3   PLATELETS 10*3/mm3 258 274        Results from last 7 days   Lab Units 08/18/24  0239   SODIUM mmol/L 136   POTASSIUM mmol/L 5.1   CHLORIDE mmol/L 100   CO2 mmol/L 23.0   BUN mg/dL 23   CREATININE mg/dL 0.73   CALCIUM mg/dL 9.0   BILIRUBIN mg/dL 0.2   ALK PHOS U/L 156*   ALT (SGPT) U/L 17   AST (SGOT) U/L 41*   GLUCOSE mg/dL 253*       Culture Data:   No results found for: \"BLOODCX\", \"URINECX\", \"WOUNDCX\", \"MRSACX\", \"RESPCX\", \"STOOLCX\"    Microbiology Results (last 10 days)       Procedure Component Value - Date/Time    Stool Culture (Reference Lab) - Stool, Per Rectum [[id number]] Collected: 08/09/24 1652    Lab Status: Final result Specimen: Stool from Per Rectum Updated: 08/16/24 1411     Salmonella/Shigella Screen Final report     Result 1 Comment     Comment: No Salmonella or Shigella recovered.        Campylobacter Culture Final report     Result 1 Comment     Comment: No Campylobacter species isolated.        E coli, Shiga toxin Assay Negative    Narrative:      Performed at:  [address] - " [id number] [place], [place]  [id number]  : Uriah Roach PhD, Phone:  [phone]    Clostridioides difficile Toxin, PCR - Stool, Per Rectum [[id number]]  (Normal) Collected: 08/09/24 1652    Lab Status: Final result Specimen: Stool from Per Rectum Updated: 08/09/24 1755     Toxigenic C. difficile by PCR Negative    Narrative:      The result indicates the absence of toxigenic C. difficile from stool specimen.              Radiology Data:   Imaging Results (Last 24 Hours)       Procedure Component Value Units Date/Time    XR Shoulder 2+ View Left [[id number]] Collected: 08/18/24 0813     Updated: 08/18/24 0818    Narrative:      EXAM: XR SHOULDER 2+ VW LEFT-      INDICATION: Fall on left shoulder with concerns for fracture versus  dislocation; S42.352A-Displaced comminuted fracture of shaft of humerus,  left arm, initial encounter for closed fracture      COMPARISON: None.     TECHNIQUE: 2 views of the left humerus was obtained.     FINDINGS:     Displaced spiral fracture involving the mid to proximal left humerus  metadiaphysis. There is a cortical lucency involving the greater  tuberosity. Glenohumeral joint appears preserved. AC joint is preserved.  Soft tissues are grossly unremarkable.       Impression:         Displaced spiral fracture involving the mid to proximal left humeral  metadiaphysis. Cortical lucency involving the greater tuberosity is  suspicious for humeral head extension.        This report was signed and finalized on 8/18/2024 8:15 AM by Rd Bynum.       XR Humerus Left [[id number]] Collected: 08/18/24 0811     Updated: 08/18/24 0816    Narrative:      EXAM: XR HUMERUS LEFT-      INDICATION: For humeral shaft fracture with severe pain and tenderness  after fall; S42.352A-Displaced comminuted fracture of shaft of humerus,  left arm, initial encounter for closed fracture      COMPARISON: None.     TECHNIQUE: 2 views of the left humerus was obtained.     FINDINGS:      Displaced spiral fracture involving the left humeral diaphysis. There is  some mild cortical irregularity involving the greater tuberosity. Soft  tissues are grossly unremarkable.       Impression:         Displaced spiral fracture involving the left humeral diaphysis. There is  some cortical irregularity/lucency involving the greater tuberosity  which may suggest humeral head extension.        This report was signed and finalized on 8/18/2024 8:13 AM by Rd Bynum.       CT Head Without Contrast [[id number]] Collected: 08/18/24 0639     Updated: 08/18/24 0644    Narrative:      Exam: CT HEAD WO CONTRAST- 8/18/2024 12:34 AM     HISTORY: Fall with distracting injury; S42.352A-Displaced comminuted  fracture of shaft of humerus, left arm, initial encounter for closed  fracture       DOSE LENGTH PRODUCT: 942.33 mGy.cm mGy cm. Automated exposure control  was also utilized to decrease patient radiation dose.     Technique:  Helically acquired CT of the brain without IV contrast was performed.  Sagittal and coronal reformations are also provided for review. Soft  tissue and bone kernels are available for interpretation.     Comparison: Brain MRI 4/5/2024.     Findings:     Ventricles and extra-axial CSF spaces are normal in size.     No intraparenchymal or extra-axial hemorrhage.     Gray-white matter differentiation is preserved.     Orbits are grossly unremarkable. Paranasal sinuses are grossly clear.  Mastoid air cells are grossly clear.     No suspicious calvarial or extracranial soft tissue abnormality.     Other:None.       Impression:      Impression:       No acute intracranial abnormality.     These findings are in agreement with the critical and emergent findings  from the [facility] preliminary report.     This report was signed and finalized on 8/18/2024 6:41 AM by Rd Bynum.       CT Cervical Spine Without Contrast [[id number]] Collected: 08/18/24 0637     Updated: 08/18/24 0641    Narrative:       Exam: CT CERVICAL SPINE WO CONTRAST- 8/18/2024 12:34 AM     HISTORY: Fall with distracting injury; S42.352A-Displaced comminuted  fracture of shaft of humerus, left arm, initial encounter for closed  fracture     COMPARISON: None      DOSE LENGTH PRODUCT: 942.33 mGy.cm mGy cm. Automated exposure control  was also utilized to decrease patient radiation dose.     TECHNIQUE: Serial helical tomographic images of the cervical spine were  obtained without the use of intravenous contrast. Additionally, sagittal  and coronal reformatted images were also provided for review.     FINDINGS:     Minimal degenerative anterolisthesis of C4-C5.     No acute fracture.     The vertebral body heights are preserved.     Mild degenerative disc disease with mild disc height loss at C5-C6 and  C6-C7.     Mild facet and uncovertebral hypertrophic changes.     No visible high-grade spinal canal narrowing. No visible high-grade bony  foraminal narrowing.     Soft tissues are grossly unremarkable.     Other:None       Impression:         No acute fracture or traumatic malalignment.     Mild degenerative changes as above.     These findings are in agreement with the critical and emergent findings  from the [facility] preliminary report.           This report was signed and finalized on 8/18/2024 6:38 AM by Rd Bynum.               I have reviewed the patient's current medications.     amLODIPine, 5 mg, Oral, Q24H  famotidine, 20 mg, Intravenous, Q12H  insulin regular, 2-7 Units, Subcutaneous, Q6H  metoprolol succinate XL, 25 mg, Oral, Q24H  sodium chloride, 10 mL, Intravenous, Q12H         Intake/Output Summary (Last 24 hours) at 8/18/2024 1517  Last data filed at 8/18/2024 1300  Gross per 24 hour   Intake 440 ml   Output 650 ml   Net -210 ml        Assessment/Plan   Assessment  Active Hospital Problems    Diagnosis     **Humeral shaft fracture     Essential hypertension     Class 1 obesity due to excess calories with serious comorbidity  and body mass index (BMI) of 31.0 to 31.9 in adult     Type 2 diabetes mellitus with hyperglycemia, without long-term current use of insulin        Treatment Plan  1.  Humeral shaft fracture-presented to [facility] emergency department on 8/17/2024 after a fall tripping over her grandchildren's toys.  X-rays demonstrated a long oblique comminuted humeral fracture.  Dr. Kent, orthopedic surgery evaluated the patient with recommendations for placement of a over the shoulder Bosch fracture orthosis in the morning with follow-up in 10 to 14 days.  No surgical intervention at this time. Continue to monitor, place orthotic in the a.m.    2.  Essential hypertension-extensive discussion with the patient regarding her medication tolerance and compliance.  BP upon admission was 141/61.  Zoomed home Norvasc and metoprolol however at 5 mg daily and Toprol-XL 25 mg daily.  Continue every 4 vital signs    3.  Class I obesity-cardiac diet, dietitian consultation.    4.  Type 2 diabetes mellitus with hyperglycemia, without long-term current use of insulin-metformin on hold, continue low-dose sliding scale insulin with Accu-Cheks before meals and at bedtime, last A1c 6.5      Medical Decision Making  Humeral shaft fracture, acute, moderate complexity, unchanged  Essential hypertension, chronic, moderate complexity, unchanged  Class I obesity chronic, moderate complexity, stable  Type 2 diabetes mellitus with hyperglycemia, chronic, moderate complexity, stable    Number and Complexity of problems: 4  Differential Diagnosis: None    Conditions and Status        Condition is unchanged.     [facility] Data  External documents reviewed: Epic review of PCPs office notes  Cardiac tracing (EKG, telemetry) interpretation: 8/17/2024 EKG per cardiology reviewed  Radiology interpretation: 8/18/2024 CT cervical spine, CT head, x-ray of the shoulder and x-ray of the humerus per radiology reviewed  Labs reviewed: 8/18/2024 CBC with differential, PT/INR,  PTT, CMP, magnesium reviewed, repeat labs in a.m.  any tests that were considered but not ordered: None     Decision rules/scores evaluated (example [id number], Wells, etc): None     Discussed with: Dr. Randhawa, patient and her  Jimmie     Care Planning  Shared decision making: Dr. Randhawa, patient and her  Jimmie  Code status and discussions: Full code per patient  Surrogate Decision Maker  Jimmie    Disposition  Social Determinants of Health that impact treatment or disposition: Determined at this time  I expect the patient to be discharged to home in 1 day.     Electronically signed by ARNALDO Goode, 08/18/24, 15:17 CDT.

## 2024-08-18 NOTE — PLAN OF CARE
Goal Outcome Evaluation:  Plan of Care Reviewed With: patient        Progress: no change  Outcome Evaluation: Pt alert and oriented x4, c/o pain, prn meds given. Pt walking and sitting up in chair. with splint and sling. Continue to monitor blood glucose. Family at bedside.

## 2024-08-18 NOTE — ED PROVIDER NOTES
"EMERGENCY DEPARTMENT ATTENDING NOTE    Patient Name: Pauline Molina    Chief Complaint   Patient presents with    Fall       PATIENT PRESENTATION:  Pauline Molina is a 74 y.o. female with PMH significant for hypertension, type 2 diabetes, mitral valve prolapse, GERD, not on anticoagulation who presents to the ED with a mechanical fall where she tripped on her great grandsons toy in the kitchen and fell on her left side.  Denies hitting her head or losing consciousness but could not get up.  Was unwitnessed but the patient's mother who lives with her and her  due to her dementia heard the fall and came and woke up her  after hearing the episode.  She endorses severe pain and did receive 100 mcg of fentanyl by EMS with concern for possible left arm fracture.  Severe pain with any movement.  Patient denies any headaches or vision changes.  Mild posterior neck pain.  Is not on anticoagulation.  Denies any chest pain, shortness of breath, abdominal pain, or other extremity symptoms.  Last meal was at 2200 she had guacamole and crackers.  Patient is right-hand dominant.   and son at bedside.      PHYSICAL EXAM:   VS: /61   Pulse 82   Temp 98.1 °F (36.7 °C) (Oral)   Resp 20   Ht 152.4 cm (60\")   Wt 74.4 kg (164 lb)   SpO2 90%   BMI 32.03 kg/m²   GENERAL: Elderly, awake, alert, no acute distress, nontoxic appearing, comfortable  EYES: PERRL, sclerae anicteric, extraocular movements grossly intact, symmetric lids  EARS, NOSE, MOUTH, THROAT: atraumatic external nose and ears, moist mucous membranes, no septal hematoma  NECK: symmetric, trachea midline  RESPIRATORY: unlabored respiratory effort, clear to auscultation bilaterally, good air movement  CARDIOVASCULAR: no murmurs, peripheral pulses 2+ and equal in all extremities  GI: soft, nontender, nondistended  MUSCULOSKELETAL/EXTREMITIES: Tense swelling on the midshaft of the humerus on the left side, patient with severe pain with " movement.  Distally neurovascularly intact.  No other extremity tenderness, or deformities  SKIN: warm and dry with no obvious rashes  NEUROLOGIC: Moving all 3 extremities symmetrically and not moving left upper extremity at all.  Able to move fingers without any issue on the left upper extremity, CN II-XII intact, unable to assess cerebellar function on the left arm.  Intact cerebellar function otherwise.  PSYCHIATRIC: alert, pleasant and cooperative. Appropriate mood and affect.      MEDICAL DECISION MAKING:    Pauline Molina is a 74 y.o. female who presented to the ED after mechanical fall with severe left shoulder and arm pain    Procedures    Differential Diagnosis Considered: Left shoulder dislocation, humeral shaft fracture, intracranial hemorrhage, cervical spine injury    Labs Ordered:  Labs Reviewed   COMPREHENSIVE METABOLIC PANEL - Abnormal; Notable for the following components:       Result Value    Glucose 253 (*)     AST (SGOT) 41 (*)     Alkaline Phosphatase 156 (*)     BUN/Creatinine Ratio 31.5 (*)     All other components within normal limits    Narrative:     GFR Normal >60  Chronic Kidney Disease <60  Kidney Failure <15    The GFR formula is only valid for adults with stable renal function between ages 18 and 70.   PROTIME-INR - Abnormal; Notable for the following components:    INR 0.86 (*)     All other components within normal limits   CBC WITH AUTO DIFFERENTIAL - Abnormal; Notable for the following components:    MCHC 35.9 (*)     Neutrophil % 77.1 (*)     Lymphocyte % 13.3 (*)     Neutrophils, Absolute 8.01 (*)     All other components within normal limits   APTT - Normal   TYPE AND SCREEN   CBC AND DIFFERENTIAL    Narrative:     The following orders were created for panel order CBC & Differential.  Procedure                               Abnormality         Status                     ---------                               -----------         ------                     CBC Auto  Differential[[id number]]        Abnormal            Final result                 Please view results for these tests on the individual orders.        Imaging Ordered:   XR Shoulder 2+ View Left   ED Interpretation   Displaced comminuted humeral shaft fracture.      XR Humerus Left   ED Interpretation   Displaced comminuted humeral shaft fracture      CT Head Without Contrast    (Results Pending)   CT Cervical Spine Without Contrast    (Results Pending)       Internal chart review:   Past Medical History:   Diagnosis Date    Arrhythmia     Family history of colonic polyps     GERD (gastroesophageal reflux disease)     History of adenomatous polyp of colon     History of colon polyps     Hyperlipidemia     Hypertension     MVP (mitral valve prolapse)     Pseudotumor cerebri     Type 2 diabetes mellitus        Past Surgical History:   Procedure Laterality Date    CARDIAC CATHETERIZATION      CATARACT EXTRACTION Right 08/15/2021    CATARACT EXTRACTION Left 2021     SECTION      CHOLECYSTECTOMY      COLONOSCOPY  2015    One 3-4mm hyperplastic polyp in the rectum; Repeat 5 years    COLONOSCOPY  2007    Normal; Repeat 3-4 years    COLONOSCOPY  2004    One 2cm pedunculated erythematous adenomatous polyp in the sigmoid colon; One 6mm hyperplastic polyp in the rectum; Repeat 3 years    COLONOSCOPY N/A 2021    One 7mm tubular adenomatous polyp in the transverse colon; The examination was otherwise normal on direct and retroflexion views; Repeat 5 years    DILATATION AND CURETTAGE      ENDOSCOPY  2009    Normal endoscopy    ENDOSCOPY  2004    GERD    ENDOSCOPY N/A 2023    Small HH; Non-obstructing Schatzki ring-Dilated; Normal stomach; Normal examined duodenum; No specimens collected    HERNIA REPAIR      HYSTERECTOMY      LAPAROSCOPIC LYSIS OF ADHESIONS      OOPHORECTOMY      OTHER SURGICAL HISTORY  2010    EUS-Dr. Hastings-Normal EUS evaluation of the pancreas, bile  duct, and ampulla of Vater-pancreatitis remains idiopathic       Allergies   Allergen Reactions    Lactose Intolerance (Gi) GI Intolerance     Takes Lactaid       No current facility-administered medications for this encounter.    Current Outpatient Medications:     acetaminophen (TYLENOL) 325 MG tablet, Take 1 tablet by mouth As Needed., Disp: , Rfl:     amLODIPine (NORVASC) 2.5 MG tablet, Take 2 tablets by mouth 2 (Two) Times a Day., Disp: , Rfl:     Barberry-Oreg Grape-Goldenseal (BERBERINE COMPLEX PO), Take 1 capsule by mouth Daily., Disp: , Rfl:     Coenzyme Q10 (CoQ-10) 100 MG capsule, Take 1 capsule by mouth Daily., Disp: , Rfl:     estradiol (ESTRACE) 0.1 MG/GM vaginal cream, Insert 2 g into the vagina 3 (Three) Times a Week., Disp: 42.5 g, Rfl: 5    famotidine (Pepcid) 20 MG tablet, Take 1 tablet by mouth 2 (Two) Times a Day., Disp: 60 tablet, Rfl: 2    Fenugreek 500 MG capsule, Take 1 capsule by mouth Daily., Disp: , Rfl:     losartan (COZAAR) 100 MG tablet, Take 1 tablet by mouth once daily, Disp: 90 tablet, Rfl: 3    magnesium oxide (MAG-OX) 400 MG tablet, Take 1 tablet by mouth Daily., Disp: , Rfl:     metFORMIN ER (GLUCOPHAGE-XR) 500 MG 24 hr tablet, Take 1 tablet by mouth 2 (Two) Times a Day., Disp: , Rfl:     metoprolol tartrate (LOPRESSOR) 25 MG tablet, Take 1 tablet by mouth 2 (Two) Times a Day., Disp: 180 tablet, Rfl: 3    sennosides-docusate (Senokot S) 8.6-50 MG per tablet, Take 1 tablet by mouth 2 (Two) Times a Day. Take 1 tablet by mouth twice daily for 3 days, then decrease to 1 tablet once daily, Disp: 60 tablet, Rfl: 2    vitamin B-12 (CYANOCOBALAMIN) 1000 MCG tablet, Take 1 tablet by mouth Daily. Spray, Disp: , Rfl:     vitamin D3 125 MCG (5000 UT) capsule capsule, Take 1 capsule by mouth Daily., Disp: , Rfl:     External documents reviewed: Recently saw her PCM for constipation and is scheduled for an endoscopy for dysphagia    My EKG interpretation: EKG normal sinus rhythm, no signs of  ischemia or arrhythmia, normal intervals.  Rate of 84    My lab interpretation: No coagulopathy, type and screen pending, CBC without significant abnormalities.  Mild AST elevation otherwise normal CMP.    My imaging interpretation: CT head and C-spine without acute process.  Displaced comminuted humeral shaft fracture with multiple fragments on x-ray.    Discussed with: Patient's son, , orthopedic surgery, and Dr. Kirk    ED Course and Re-evaluation: Patient remained neurovascular intact with a displaced comminuted humeral shaft fracture discussed with orthopedics will require surgery.  Patient admitted to medicine for pain management and orthopedics to see in the morning.  Patient to be n.p.o.  Type and screen was sent.  Screening EKG without significant abnormalities.    ED Course as of 08/18/24 0348   Sun Aug 18, 2024   0158 Patient with a displaced comminuted humeral shaft fracture and currently paging Ortho to discuss if this will be a surgical candidate and will need to be admitted for procedure tomorrow. [JJ]   0212 CBC and coags without significant abnormalities. [JJ]   0230 CT head and C-spine without acute findings. [JJ]   0323 Discussed with orthopedics Dr. Kent and recommended a sugar-tong for the upper arm and admission to medicine for pain control with surgery possibly tomorrow. [JJ]   0345 Patient reassessed after splint application and neurovascular intact. [JJ]      ED Course User Index  [JJ] Jaron Rodriguez MD        ED Diagnosis:  (S42.352A) Closed displaced comminuted fracture of shaft of left humerus, initial encounter     Disposition: admission for surgery        Signed:  Jaron Rodriguez MD  Emergency Medicine Physician    Please note that portions of this note were completed with a voice recognition program.      Jaron Rodriguez MD  08/18/24 8699

## 2024-08-18 NOTE — CASE MANAGEMENT/SOCIAL WORK
Discharge Planning Assessment  [facility]     Patient Name: Pauline Molina  MRN: [id number]  Today's Date: 8/18/2024    Admit Date: 8/18/2024    Plan: Home   Discharge Needs Assessment       Row Name 08/18/24 0842       Living Environment    People in Home spouse;parent(s)    Name(s) of People in Home Spouse- Jimmie and caregiver for her mother    Current Living Arrangements home    Potentially Unsafe Housing Conditions none    Primary Care Provided by self    Provides Primary Care For parent(s)    Family Caregiver if Needed spouse    Quality of Family Relationships supportive;involved;helpful    Able to Return to Prior Arrangements yes       Resource/Environmental Concerns    Resource/Environmental Concerns none       Transportation Needs    In the past 12 months, has lack of transportation kept you from medical appointments or from getting medications? no    In the past 12 months, has lack of transportation kept you from meetings, work, or from getting things needed for daily living? No       Food Insecurity    Within the past 12 months, you worried that your food would run out before you got the money to buy more. Never true    Within the past 12 months, the food you bought just didn't last and you didn't have money to get more. Never true       Transition Planning    Patient/Family Anticipates Transition to home with family    Patient/Family Anticipated Services at Transition none    Transportation Anticipated family or friend will provide       Discharge Needs Assessment    Readmission Within the Last 30 Days no previous admission in last 30 days    Equipment Currently Used at Home none    Concerns to be Addressed denies needs/concerns at this time    Anticipated Changes Related to Illness none    Equipment Needed After Discharge none                   Discharge Plan       Row Name 08/18/24 0844       Plan    Plan Home    Patient/Family in Agreement with Plan yes    Plan Comments Spoke with pt to assess for  d/c planning. Pt lives at home with her spouse and caregiver for her mother. Pt is independent and plans to return home as before.  Pt has RX coverage/PCP. Will follow.                  Continued Care and Services - Admitted Since 8/18/2024    No active coordination exists for this encounter.          Demographic Summary    No documentation.                  Functional Status    No documentation.                  Psychosocial    No documentation.                  Abuse/Neglect    No documentation.                  Legal    No documentation.                  Substance Abuse    No documentation.                  Patient Forms    No documentation.                     SARIKA SrW

## 2024-08-18 NOTE — CONSULTS
Oskar Kent M.D.    Orthopedic History and Physical    Pt Name: Pauilne Molina  MRN: [id number]  YOB: 1950  Date: 2024      HPI: 74-year-old female consulted to the orthopedic service for a left humeral shaft fracture.  The patient fell at home last night around 1015.  She presented to the emergency department where x-rays demonstrated a long oblique comminuted humeral shaft fracture.  She denies any other injuries.  She is a diabetic states her last A1c was around 6.5.  She does not smoke.      Past Medical/Surgical History:   Past Medical History:   Diagnosis Date    Arrhythmia     Family history of colonic polyps     GERD (gastroesophageal reflux disease)     History of adenomatous polyp of colon     History of colon polyps     Hyperlipidemia     Hypertension     MVP (mitral valve prolapse)     Pseudotumor cerebri     Type 2 diabetes mellitus      Past Surgical History:   Procedure Laterality Date    CARDIAC CATHETERIZATION      CATARACT EXTRACTION Right 08/15/2021    CATARACT EXTRACTION Left 2021     SECTION      CHOLECYSTECTOMY      COLONOSCOPY  2015    One 3-4mm hyperplastic polyp in the rectum; Repeat 5 years    COLONOSCOPY  2007    Normal; Repeat 3-4 years    COLONOSCOPY  2004    One 2cm pedunculated erythematous adenomatous polyp in the sigmoid colon; One 6mm hyperplastic polyp in the rectum; Repeat 3 years    COLONOSCOPY N/A 2021    One 7mm tubular adenomatous polyp in the transverse colon; The examination was otherwise normal on direct and retroflexion views; Repeat 5 years    DILATATION AND CURETTAGE      ENDOSCOPY  2009    Normal endoscopy    ENDOSCOPY  2004    GERD    ENDOSCOPY N/A 2023    Small HH; Non-obstructing Schatzki ring-Dilated; Normal stomach; Normal examined duodenum; No specimens collected    HERNIA REPAIR      HYSTERECTOMY      LAPAROSCOPIC LYSIS OF ADHESIONS      OOPHORECTOMY      OTHER SURGICAL HISTORY  2010     EUS-Dr. Hastings-Normal EUS evaluation of the pancreas, bile duct, and ampulla of Vater-pancreatitis remains idiopathic         Social History:   Social History     Socioeconomic History    Marital status:    Tobacco Use    Smoking status: Never    Smokeless tobacco: Never   Vaping Use    Vaping status: Never Used   Substance and Sexual Activity    Alcohol use: Never    Drug use: Never    Sexual activity: Defer            Medications:   Current Facility-Administered Medications:     acetaminophen (TYLENOL) tablet 650 mg, 650 mg, Oral, Q4H PRN, Tanvi Zelaya DO    sennosides-docusate (PERICOLACE) 8.6-50 MG per tablet 2 tablet, 2 tablet, Oral, BID PRN **AND** polyethylene glycol (MIRALAX) packet 17 g, 17 g, Oral, Daily PRN **AND** bisacodyl (DULCOLAX) EC tablet 5 mg, 5 mg, Oral, Daily PRN **AND** bisacodyl (DULCOLAX) suppository 10 mg, 10 mg, Rectal, Daily PRN, Tanvi Zelaya DO    dextrose (D50W) (25 g/50 mL) IV injection 25 g, 25 g, Intravenous, Q15 Min PRN, Tanvi Zelaya DO    dextrose (GLUTOSE) oral gel 15 g, 15 g, Oral, Q15 Min PRN, Tanvi Zelaya DO    famotidine (PEPCID) injection 20 mg, 20 mg, Intravenous, Q12H, Tanvi Zelaya DO, 20 mg at 08/18/24 0841    glucagon (GLUCAGEN) injection 1 mg, 1 mg, Intramuscular, Q15 Min PRN, Tanvi Zelaya DO    hydrALAZINE (APRESOLINE) injection 10 mg, 10 mg, Intravenous, Q6H PRN, Tanvi Zelaya DO    HYDROmorphone (DILAUDID) injection 0.5 mg, 0.5 mg, Intravenous, Q3H PRN, Tanvi Zelaya DO, 0.5 mg at 08/18/24 0636    insulin regular (humuLIN R,novoLIN R) injection 2-7 Units, 2-7 Units, Subcutaneous, Q6H, Tanvi Zelaya DO, 3 Units at 08/18/24 0658    lactated ringers infusion, 75 mL/hr, Intravenous, Continuous, Tanvi Zelaya DO, Last Rate: 75 mL/hr at 08/18/24 0648, 75 mL/hr at 08/18/24 0648    ondansetron (ZOFRAN) injection 4 mg, 4 mg, Intravenous, Q6H PRN, Tanvi Zelaya DO    sodium chloride 0.9 % flush 10  mL, 10 mL, Intravenous, Q12H, Tanvi Zelaya DO, 10 mL at 08/18/24 0841    sodium chloride 0.9 % flush 10 mL, 10 mL, Intravenous, PRN, Tanvi Zelaya DO    sodium chloride 0.9 % infusion 40 mL, 40 mL, Intravenous, PRN, Tanvi Zelaya DO    Allergies:   Allergies   Allergen Reactions    Lactose Intolerance (Gi) GI Intolerance     Takes Lactaid          Review of Systems   Constitutional: Negative.    HENT: Negative.     Eyes: Negative.    Respiratory: Negative.     Cardiovascular: Negative.    Gastrointestinal: Negative.    Genitourinary: Negative.    Skin: Negative.    Allergic/Immunologic: Negative.    Neurological: Negative.    Hematological: Negative.    Psychiatric/Behavioral: Negative.            Physical Exam  Constitutional:       Appearance: She is well-developed.   HENT:      Head: Normocephalic and atraumatic.   Pulmonary:      Effort: Pulmonary effort is normal.   Abdominal:      Palpations: Abdomen is soft.   Musculoskeletal:      Cervical back: Normal range of motion and neck supple.   Skin:     General: Skin is warm and dry.   Neurological:      Mental Status: She is alert and oriented to person, place, and time.   Psychiatric:         Behavior: Behavior normal.         Thought Content: Thought content normal.         Judgment: Judgment normal.         Ortho Exam:  Left upper extremity is in a coaptation splint.  She is neuro vas intact she can flex and extend the fingers and the wrist.      Imaging:  Imaging Results (Last 72 Hours)       Procedure Component Value Units Date/Time    XR Shoulder 2+ View Left [[id number]] Collected: 08/18/24 0813     Updated: 08/18/24 0818    Narrative:      EXAM: XR SHOULDER 2+ VW LEFT-      INDICATION: Fall on left shoulder with concerns for fracture versus  dislocation; S42.352A-Displaced comminuted fracture of shaft of humerus,  left arm, initial encounter for closed fracture      COMPARISON: None.     TECHNIQUE: 2 views of the left humerus was  obtained.     FINDINGS:     Displaced spiral fracture involving the mid to proximal left humerus  metadiaphysis. There is a cortical lucency involving the greater  tuberosity. Glenohumeral joint appears preserved. AC joint is preserved.  Soft tissues are grossly unremarkable.       Impression:         Displaced spiral fracture involving the mid to proximal left humeral  metadiaphysis. Cortical lucency involving the greater tuberosity is  suspicious for humeral head extension.        This report was signed and finalized on 8/18/2024 8:15 AM by Rd Bynum.       XR Humerus Left [[id number]] Collected: 08/18/24 0811     Updated: 08/18/24 0816    Narrative:      EXAM: XR HUMERUS LEFT-      INDICATION: For humeral shaft fracture with severe pain and tenderness  after fall; S42.352A-Displaced comminuted fracture of shaft of humerus,  left arm, initial encounter for closed fracture      COMPARISON: None.     TECHNIQUE: 2 views of the left humerus was obtained.     FINDINGS:     Displaced spiral fracture involving the left humeral diaphysis. There is  some mild cortical irregularity involving the greater tuberosity. Soft  tissues are grossly unremarkable.       Impression:         Displaced spiral fracture involving the left humeral diaphysis. There is  some cortical irregularity/lucency involving the greater tuberosity  which may suggest humeral head extension.        This report was signed and finalized on 8/18/2024 8:13 AM by Rd Bynum.       CT Head Without Contrast [[id number]] Collected: 08/18/24 0639     Updated: 08/18/24 0644    Narrative:      Exam: CT HEAD WO CONTRAST- 8/18/2024 12:34 AM     HISTORY: Fall with distracting injury; S42.352A-Displaced comminuted  fracture of shaft of humerus, left arm, initial encounter for closed  fracture       DOSE LENGTH PRODUCT: 942.33 mGy.cm mGy cm. Automated exposure control  was also utilized to decrease patient radiation dose.     Technique:  Helically acquired CT  of the brain without IV contrast was performed.  Sagittal and coronal reformations are also provided for review. Soft  tissue and bone kernels are available for interpretation.     Comparison: Brain MRI 4/5/2024.     Findings:     Ventricles and extra-axial CSF spaces are normal in size.     No intraparenchymal or extra-axial hemorrhage.     Gray-white matter differentiation is preserved.     Orbits are grossly unremarkable. Paranasal sinuses are grossly clear.  Mastoid air cells are grossly clear.     No suspicious calvarial or extracranial soft tissue abnormality.     Other:None.       Impression:      Impression:       No acute intracranial abnormality.     These findings are in agreement with the critical and emergent findings  from the [facility] preliminary report.     This report was signed and finalized on 8/18/2024 6:41 AM by Rd Bynum.       CT Cervical Spine Without Contrast [[id number]] Collected: 08/18/24 0637     Updated: 08/18/24 0641    Narrative:      Exam: CT CERVICAL SPINE WO CONTRAST- 8/18/2024 12:34 AM     HISTORY: Fall with distracting injury; S42.352A-Displaced comminuted  fracture of shaft of humerus, left arm, initial encounter for closed  fracture     COMPARISON: None      DOSE LENGTH PRODUCT: 942.33 mGy.cm mGy cm. Automated exposure control  was also utilized to decrease patient radiation dose.     TECHNIQUE: Serial helical tomographic images of the cervical spine were  obtained without the use of intravenous contrast. Additionally, sagittal  and coronal reformatted images were also provided for review.     FINDINGS:     Minimal degenerative anterolisthesis of C4-C5.     No acute fracture.     The vertebral body heights are preserved.     Mild degenerative disc disease with mild disc height loss at C5-C6 and  C6-C7.     Mild facet and uncovertebral hypertrophic changes.     No visible high-grade spinal canal narrowing. No visible high-grade bony  foraminal narrowing.     Soft tissues are  grossly unremarkable.     Other:None       Impression:         No acute fracture or traumatic malalignment.     Mild degenerative changes as above.     These findings are in agreement with the critical and emergent findings  from the [facility] preliminary report.           This report was signed and finalized on 8/18/2024 6:38 AM by Rd Bynum.               Labs:   Lab Results (last 24 hours)       Procedure Component Value Units Date/Time    POC Glucose Once [[id number]]  (Abnormal) Collected: 08/18/24 0646    Specimen: Blood Updated: 08/18/24 0657     Glucose 243 mg/dL      Comment: : [id number] Bhupendra SamuelMeter ID: [id number]       CBC Auto Differential [[id number]]  (Abnormal) Collected: 08/18/24 0438    Specimen: Blood Updated: 08/18/24 0536     WBC 11.66 10*3/mm3      RBC 3.94 10*6/mm3      Hemoglobin 12.4 g/dL      Hematocrit 35.7 %      MCV 90.6 fL      MCH 31.5 pg      MCHC 34.7 g/dL      RDW 13.2 %      RDW-SD 43.2 fl      MPV 10.3 fL      Platelets 258 10*3/mm3      Neutrophil % 81.2 %      Lymphocyte % 10.9 %      Monocyte % 6.9 %      Eosinophil % 0.3 %      Basophil % 0.3 %      Immature Grans % 0.4 %      Neutrophils, Absolute 9.48 10*3/mm3      Lymphocytes, Absolute 1.27 10*3/mm3      Monocytes, Absolute 0.80 10*3/mm3      Eosinophils, Absolute 0.03 10*3/mm3      Basophils, Absolute 0.03 10*3/mm3      Immature Grans, Absolute 0.05 10*3/mm3      nRBC 0.0 /100 WBC     Comprehensive Metabolic Panel [[id number]]  (Abnormal) Collected: 08/18/24 0239    Specimen: Blood from Arm, Right Updated: 08/18/24 0318     Glucose 253 mg/dL      BUN 23 mg/dL      Creatinine 0.73 mg/dL      Sodium 136 mmol/L      Potassium 5.1 mmol/L      Comment: Specimen hemolyzed.  Result may be falsely elevated.        Chloride 100 mmol/L      CO2 23.0 mmol/L      Calcium 9.0 mg/dL      Total Protein 6.9 g/dL      Albumin 4.2 g/dL      ALT (SGPT) 17 U/L      Comment: Specimen hemolyzed.  Result may  be falsely elevated.         AST (SGOT) 41 U/L      Comment: Specimen hemolyzed.  Result may be falsely elevated.        Alkaline Phosphatase 156 U/L      Total Bilirubin 0.2 mg/dL      Globulin 2.7 gm/dL      A/G Ratio 1.6 g/dL      BUN/Creatinine Ratio 31.5     Anion Gap 13.0 mmol/L      eGFR 87.5 mL/min/1.73     Narrative:      GFR Normal >60  Chronic Kidney Disease <60  Kidney Failure <15    The GFR formula is only valid for adults with stable renal function between ages 18 and 70.    Protime-INR [[id number]]  (Abnormal) Collected: 08/18/24 0107    Specimen: Blood Updated: 08/18/24 0123     Protime 12.0 Seconds      INR 0.86    aPTT [[id number]]  (Normal) Collected: 08/18/24 0107    Specimen: Blood Updated: 08/18/24 0123     PTT 28.2 seconds     CBC & Differential [[id number]]  (Abnormal) Collected: 08/18/24 0107    Specimen: Blood Updated: 08/18/24 0114    Narrative:      The following orders were created for panel order CBC & Differential.  Procedure                               Abnormality         Status                     ---------                               -----------         ------                     CBC Auto Differential[[id number]]        Abnormal            Final result                 Please view results for these tests on the individual orders.    CBC Auto Differential [[id number]]  (Abnormal) Collected: 08/18/24 0107    Specimen: Blood Updated: 08/18/24 0114     WBC 10.39 10*3/mm3      RBC 4.36 10*6/mm3      Hemoglobin 14.1 g/dL      Hematocrit 39.3 %      MCV 90.1 fL      MCH 32.3 pg      MCHC 35.9 g/dL      RDW 13.1 %      RDW-SD 42.8 fl      MPV 10.0 fL      Platelets 274 10*3/mm3      Neutrophil % 77.1 %      Lymphocyte % 13.3 %      Monocyte % 7.4 %      Eosinophil % 1.4 %      Basophil % 0.4 %      Immature Grans % 0.4 %      Neutrophils, Absolute 8.01 10*3/mm3      Lymphocytes, Absolute 1.38 10*3/mm3      Monocytes, Absolute 0.77 10*3/mm3      Eosinophils, Absolute 0.15 10*3/mm3      Basophils, Absolute 0.04 10*3/mm3       Immature Grans, Absolute 0.04 10*3/mm3      nRBC 0.0 /100 WBC             Impression: 74-year-old female with a comminuted oblique left humeral shaft fracture.      Plan:   No surgery planned patient can eat today.      Will plan to treat this in a fracture orthosis.  I will have her fitted tomorrow with an over the shoulder Bosch fracture orthosis.  She will be able to move her wrist elbow and hand.  Strict nonweightbearing throughout the left upper extremity.    Will need follow-up in the office to recheck an x-ray in in 10 to 14 days.    The patient can most likely go home on Monday late morning or early afternoon after fitted with a brace.      Electronically signed by Oskar Kent MD on 8/18/2024 at 08:45 CDT     Protopic Counseling: Patient may experience a mild burning sensation during topical application. Protopic is not approved in children less than 2 years of age. There have been case reports of hematologic and skin malignancies in patients using topical calcineurin inhibitors although causality is questionable.

## 2024-08-18 NOTE — PLAN OF CARE
Goal Outcome Evaluation:  Plan of Care Reviewed With: patient, spouse        Progress: no change  Outcome Evaluation: Pt arrived from ER at approximately 0420, A&Ox4. Pain medication given as needed per pt request. IVF infusing at 75 mL/hr. Blood glucose monitored, supplemental insulin given as needed. LUE in splint and sling. NPO for surgery in AM. Safety maintained. Call light in reach.

## 2024-08-19 ENCOUNTER — READMISSION MANAGEMENT (OUTPATIENT)
Dept: CALL CENTER | Facility: HOSPITAL | Age: 74
End: 2024-08-19
Payer: MEDICARE

## 2024-08-19 ENCOUNTER — TELEPHONE (OUTPATIENT)
Dept: INTERNAL MEDICINE | Facility: CLINIC | Age: 74
End: 2024-08-19

## 2024-08-19 VITALS
BODY MASS INDEX: 32.45 KG/M2 | DIASTOLIC BLOOD PRESSURE: 59 MMHG | SYSTOLIC BLOOD PRESSURE: 132 MMHG | WEIGHT: 165.3 LBS | TEMPERATURE: 98.2 F | HEART RATE: 63 BPM | OXYGEN SATURATION: 91 % | RESPIRATION RATE: 18 BRPM | HEIGHT: 60 IN

## 2024-08-19 LAB
GLUCOSE BLDC GLUCOMTR-MCNC: 124 MG/DL (ref 70–130)
GLUCOSE BLDC GLUCOMTR-MCNC: 148 MG/DL (ref 70–130)

## 2024-08-19 PROCEDURE — G0378 HOSPITAL OBSERVATION PER HR: HCPCS

## 2024-08-19 PROCEDURE — 82948 REAGENT STRIP/BLOOD GLUCOSE: CPT

## 2024-08-19 RX ORDER — HYDROCODONE BITARTRATE AND ACETAMINOPHEN 5; 325 MG/1; MG/1
1 TABLET ORAL EVERY 4 HOURS PRN
Qty: 18 TABLET | Refills: 0 | Status: SHIPPED | OUTPATIENT
Start: 2024-08-19 | End: 2024-08-23

## 2024-08-19 RX ORDER — CYCLOBENZAPRINE HCL 5 MG
5 TABLET ORAL 3 TIMES DAILY PRN
Qty: 30 TABLET | Refills: 0 | Status: SHIPPED | OUTPATIENT
Start: 2024-08-19

## 2024-08-19 RX ORDER — METOPROLOL SUCCINATE 25 MG/1
25 TABLET, EXTENDED RELEASE ORAL
Qty: 30 TABLET | Refills: 2 | Status: SHIPPED | OUTPATIENT
Start: 2024-08-19

## 2024-08-19 RX ORDER — AMLODIPINE BESYLATE 5 MG/1
5 TABLET ORAL
Start: 2024-08-19

## 2024-08-19 RX ADMIN — HYDROCODONE BITARTRATE AND ACETAMINOPHEN 1 TABLET: 5; 325 TABLET ORAL at 03:27

## 2024-08-19 RX ADMIN — CYCLOBENZAPRINE HYDROCHLORIDE 5 MG: 10 TABLET, FILM COATED ORAL at 05:32

## 2024-08-19 RX ADMIN — HYDROCODONE BITARTRATE AND ACETAMINOPHEN 1 TABLET: 5; 325 TABLET ORAL at 09:14

## 2024-08-19 NOTE — PLAN OF CARE
Goal Outcome Evaluation:  Plan of Care Reviewed With: patient, spouse        Progress: improving  Outcome Evaluation: Pt A&Ox4.  at bedside. Pain medication given as needed per pt request. PRN flexeril ordered for muscle spasms at pt request, given with good results. Pt slept most of the night. Voiding via external catheter. O2 at 2L while sleeping. Safety maintained. Call light in reach.

## 2024-08-19 NOTE — DISCHARGE PLACEMENT REQUEST
"Adilene Ross [phone]  Pauline Molina (74 y.o. Female)       Date of Birth   1950    Social Security Number       Address   [address] [facility] [address]    Home Phone   [phone]    MRN   [id number]       Yazidism   Hinduism    Marital Status                               Admission Date   8/18/24    Admission Type   Emergency    Admitting Provider   Elaine Randhawa DO    Attending Provider   Elaine Randhawa DO    Department, Room/Bed   [facility] 3A, 356/1       Discharge Date       Discharge Disposition   Home or Self Care    Discharge Destination                                 Attending Provider: Elaine Randhawa DO    Allergies: Lactose Intolerance (Gi)    Isolation: None   Infection: None   Code Status: CPR    Ht: 152.4 cm (60\")   Wt: 75 kg (165 lb 4.8 oz)    Admission Cmt: None   Principal Problem: Humeral shaft fracture [S42.309A]                   Active Insurance as of 8/18/2024       Primary Coverage       Payor Plan Insurance Group Employer/Plan Group    ANTHEM MEDICARE REPLACEMENT ANTHEM MEDICARE ADVANTAGE [id number]       Payor Plan Address Payor Plan Phone Number Payor Plan Fax Number Effective Dates    [address] [phone]  1/1/2019 - None Entered    [facility] [id number]         Subscriber Name Subscriber Birth Date Member ID       PAULINE MOLINA 1950 [id number]                     Emergency Contacts        (Rel.) Home Phone Work Phone Mobile Phone    APOLLO GALAN (Daughter) [phone] -- --    BETSEYLILLIE (Spouse) [phone] -- [phone]                 History & Physical        Tanvi Zelaya DO at 08/18/24 0405              [facility] Medicine Services  HISTORY AND PHYSICAL    Date of Admission: 8/18/2024  Primary Care Physician: Eric Espinoza MD    Subjective   Primary Historian: Patient    Chief Complaint: Arm pain    Fall      74-year-old " female presents emergency department status post fall.  The patient states that she was getting ready to go to bed.  She noticed one of her grandchildren's toys in the floor and she slipped on it and fell.  She landed on her left arm.  She has a left humeral fracture.  She had no loss of consciousness.  She has had no chest pain, shortness of breath, or acute bowel or kidney function.  She notes no other injury.  She does have past medical history to include hypertension, hyperlipidemia, and non-insulin-dependent diabetes.        Review of Systems   Musculoskeletal:  Positive for arthralgias.      Otherwise complete ROS reviewed and negative except as mentioned in the HPI.    Past Medical History:   Past Medical History:   Diagnosis Date    Arrhythmia     Family history of colonic polyps     GERD (gastroesophageal reflux disease)     History of adenomatous polyp of colon     History of colon polyps     Hyperlipidemia     Hypertension     MVP (mitral valve prolapse)     Pseudotumor cerebri     Type 2 diabetes mellitus      Past Surgical History:  Past Surgical History:   Procedure Laterality Date    CARDIAC CATHETERIZATION      CATARACT EXTRACTION Right 08/15/2021    CATARACT EXTRACTION Left 2021     SECTION      CHOLECYSTECTOMY      COLONOSCOPY  2015    One 3-4mm hyperplastic polyp in the rectum; Repeat 5 years    COLONOSCOPY  2007    Normal; Repeat 3-4 years    COLONOSCOPY  2004    One 2cm pedunculated erythematous adenomatous polyp in the sigmoid colon; One 6mm hyperplastic polyp in the rectum; Repeat 3 years    COLONOSCOPY N/A 2021    One 7mm tubular adenomatous polyp in the transverse colon; The examination was otherwise normal on direct and retroflexion views; Repeat 5 years    DILATATION AND CURETTAGE      ENDOSCOPY  2009    Normal endoscopy    ENDOSCOPY  2004    GERD    ENDOSCOPY N/A 2023    Small HH; Non-obstructing Schatzki ring-Dilated; Normal  stomach; Normal examined duodenum; No specimens collected    HERNIA REPAIR      HYSTERECTOMY      LAPAROSCOPIC LYSIS OF ADHESIONS      OOPHORECTOMY      OTHER SURGICAL HISTORY  06/14/2010    EUS-Dr. Hastings-Normal EUS evaluation of the pancreas, bile duct, and ampulla of Vater-pancreatitis remains idiopathic     Social History:  reports that she has never smoked. She has never used smokeless tobacco. She reports that she does not drink alcohol and does not use drugs.    Family History: family history includes Breast cancer in her maternal grandmother; Colon polyps in her father; Heart disease in her mother; Prostate cancer in her father; Stomach cancer (age of onset: 48) in her paternal grandmother.       Allergies:  Allergies   Allergen Reactions    Lactose Intolerance (Gi) GI Intolerance     Takes Lactaid       Medications:  Prior to Admission medications    Medication Sig Start Date End Date Taking? Authorizing Provider   acetaminophen (TYLENOL) 325 MG tablet Take 1 tablet by mouth As Needed.    ProviderAnn Marie MD   amLODIPine (NORVASC) 2.5 MG tablet Take 2 tablets by mouth 2 (Two) Times a Day. 7/8/24   Yuni Sanchez APRN   Barberry-Oreg Grape-Goldenseal (BERBERINE COMPLEX PO) Take 1 capsule by mouth Daily.    ProviderAnn Marie MD   Coenzyme Q10 (CoQ-10) 100 MG capsule Take 1 capsule by mouth Daily.    ProviderAnn Marie MD   estradiol (ESTRACE) 0.1 MG/GM vaginal cream Insert 2 g into the vagina 3 (Three) Times a Week. 7/17/24   Le Silverman APRN   famotidine (Pepcid) 20 MG tablet Take 1 tablet by mouth 2 (Two) Times a Day. 7/8/24   Yuni Sanchez APRN   Fenugreek 500 MG capsule Take 1 capsule by mouth Daily.    Ann Marie Padgett MD   losartan (COZAAR) 100 MG tablet Take 1 tablet by mouth once daily 10/10/23   Yuni Sanchez APRN   magnesium oxide (MAG-OX) 400 MG tablet Take 1 tablet by mouth Daily.    Ann Marie Padgett MD   metFORMIN ER (GLUCOPHAGE-XR) 500 MG 24 hr  "tablet Take 1 tablet by mouth 2 (Two) Times a Day. 6/14/24   Ann Marie Padgett MD   metoprolol tartrate (LOPRESSOR) 25 MG tablet Take 1 tablet by mouth 2 (Two) Times a Day. 6/22/24   Yuni Sanchez APRN   sennosides-docusate (Senokot S) 8.6-50 MG per tablet Take 1 tablet by mouth 2 (Two) Times a Day. Take 1 tablet by mouth twice daily for 3 days, then decrease to 1 tablet once daily 8/1/24   Yuni Sanchez APRN   vitamin B-12 (CYANOCOBALAMIN) 1000 MCG tablet Take 1 tablet by mouth Daily. Badger    Ann Marie Padgett MD   vitamin D3 125 MCG (5000 UT) capsule capsule Take 1 capsule by mouth Daily.    ProviderAnn Marie MD     I have utilized all available immediate resources to obtain, update, or review the patient's current medications (including all prescriptions, over-the-counter products, herbals, cannabis/cannabidiol products, and vitamin/mineral/dietary (nutritional) supplements).    Objective     Vital Signs: /76   Pulse 85   Temp 98.1 °F (36.7 °C) (Oral)   Resp 20   Ht 152.4 cm (60\")   Wt 74.4 kg (164 lb)   SpO2 93%   BMI 32.03 kg/m²   Physical Exam  Vitals reviewed.   Constitutional:       Appearance: Normal appearance.   HENT:      Head: Normocephalic and atraumatic.      Right Ear: External ear normal.      Left Ear: External ear normal.      Nose: Nose normal.   Eyes:      General: No scleral icterus.     Conjunctiva/sclera: Conjunctivae normal.   Cardiovascular:      Rate and Rhythm: Normal rate and regular rhythm.      Heart sounds: Normal heart sounds.   Pulmonary:      Effort: Pulmonary effort is normal.      Breath sounds: Normal breath sounds.   Abdominal:      General: Bowel sounds are normal.      Palpations: Abdomen is soft.   Musculoskeletal:         General: Swelling, tenderness, deformity and signs of injury present.      Cervical back: Normal range of motion and neck supple.      Comments: Left arm pain, with pain with range of motion.   Skin:     General: Skin " is warm and dry.   Neurological:      Mental Status: She is alert and oriented to person, place, and time.      Cranial Nerves: No cranial nerve deficit.   Psychiatric:         Mood and Affect: Mood normal.         Behavior: Behavior normal.          Results Reviewed:  Lab Results (last 24 hours)       Procedure Component Value Units Date/Time    Comprehensive Metabolic Panel [[id number]]  (Abnormal) Collected: 08/18/24 0239    Specimen: Blood from Arm, Right Updated: 08/18/24 0318     Glucose 253 mg/dL      BUN 23 mg/dL      Creatinine 0.73 mg/dL      Sodium 136 mmol/L      Potassium 5.1 mmol/L      Comment: Specimen hemolyzed.  Result may be falsely elevated.        Chloride 100 mmol/L      CO2 23.0 mmol/L      Calcium 9.0 mg/dL      Total Protein 6.9 g/dL      Albumin 4.2 g/dL      ALT (SGPT) 17 U/L      Comment: Specimen hemolyzed.  Result may  be falsely elevated.        AST (SGOT) 41 U/L      Comment: Specimen hemolyzed.  Result may be falsely elevated.        Alkaline Phosphatase 156 U/L      Total Bilirubin 0.2 mg/dL      Globulin 2.7 gm/dL      A/G Ratio 1.6 g/dL      BUN/Creatinine Ratio 31.5     Anion Gap 13.0 mmol/L      eGFR 87.5 mL/min/1.73     Narrative:      GFR Normal >60  Chronic Kidney Disease <60  Kidney Failure <15    The GFR formula is only valid for adults with stable renal function between ages 18 and 70.    Protime-INR [[id number]]  (Abnormal) Collected: 08/18/24 0107    Specimen: Blood Updated: 08/18/24 0123     Protime 12.0 Seconds      INR 0.86    aPTT [[id number]]  (Normal) Collected: 08/18/24 0107    Specimen: Blood Updated: 08/18/24 0123     PTT 28.2 seconds     CBC & Differential [[id number]]  (Abnormal) Collected: 08/18/24 0107    Specimen: Blood Updated: 08/18/24 0114    Narrative:      The following orders were created for panel order CBC & Differential.  Procedure                               Abnormality         Status                     ---------                                -----------         ------                     CBC Auto Differential[[id number]]        Abnormal            Final result                 Please view results for these tests on the individual orders.    CBC Auto Differential [[id number]]  (Abnormal) Collected: 08/18/24 0107    Specimen: Blood Updated: 08/18/24 0114     WBC 10.39 10*3/mm3      RBC 4.36 10*6/mm3      Hemoglobin 14.1 g/dL      Hematocrit 39.3 %      MCV 90.1 fL      MCH 32.3 pg      MCHC 35.9 g/dL      RDW 13.1 %      RDW-SD 42.8 fl      MPV 10.0 fL      Platelets 274 10*3/mm3      Neutrophil % 77.1 %      Lymphocyte % 13.3 %      Monocyte % 7.4 %      Eosinophil % 1.4 %      Basophil % 0.4 %      Immature Grans % 0.4 %      Neutrophils, Absolute 8.01 10*3/mm3      Lymphocytes, Absolute 1.38 10*3/mm3      Monocytes, Absolute 0.77 10*3/mm3      Eosinophils, Absolute 0.15 10*3/mm3      Basophils, Absolute 0.04 10*3/mm3      Immature Grans, Absolute 0.04 10*3/mm3      nRBC 0.0 /100 WBC           Imaging Results (Last 24 Hours)       Procedure Component Value Units Date/Time    XR Shoulder 2+ View Left [[id number]] Resulted: 08/18/24 0155     Updated: 08/18/24 0155    XR Humerus Left [[id number]] Resulted: 08/18/24 0154     Updated: 08/18/24 0154    CT Head Without Contrast [[id number]] Resulted: 08/18/24 0134     Updated: 08/18/24 0146    CT Cervical Spine Without Contrast [[id number]] Resulted: 08/18/24 0134     Updated: 08/18/24 0146          I have personally reviewed and interpreted the radiology studies and ECG obtained at time of admission.     Assessment / Plan   Assessment:   Active Hospital Problems    Diagnosis     **Humeral shaft fracture      Impression:  Humeral shaft fracture  Fall  Non-insulin-dependent diabetes with hyperglycemia  Hypertension    Treatment Plan  Admit to observation  Orthopedics consult  Pain control  Neurovascular checks  Fall precautions  N.p.o. status  Sliding-scale insulin with Accu-Cheks  Hydralazine IV as needed for  systolic pressure greater than 160    The patient will be admitted to my service here at [facility].  Primary team to take over in the morning    Medical Decision Making  Number and Complexity of problems: 4, complex  Differential Diagnosis: Musculoskeletal strain    Conditions and Status        Condition is unchanged.     [facility] Data  External documents reviewed: None  Cardiac tracing (EKG, telemetry) interpretation: None  Radiology interpretation: None  Labs reviewed: Reviewed  Any tests that were considered but not ordered: None     Decision rules/scores evaluated (example [id number], Wells, etc): None     Discussed with: Patient and family at bedside     Care Planning  Shared decision making: Patient, family, and ED staff  Code status and discussions: Full    Disposition  Social Determinants of Health that impact treatment or disposition: None  Estimated length of stay is 1 to 2 days.     I confirmed that the patient's advanced care plan is present, code status is documented, and a surrogate decision maker is listed in the patient's medical record.     The patient's surrogate decision maker is family.     The patient was seen and examined by me on 8/18/2024 at 4.    Electronically signed by Tanvi Zelaya DO, 08/18/24, 04:05 CDT.          \    Electronically signed by Tanvi Zelaya DO at 08/18/24 0409          Discharge Summary        , ARNALDO Carmen at 08/19/24 0708                [facility] Medicine Services  DISCHARGE SUMMARY       Date of Admission: 8/18/2024  Date of Discharge:  8/19/2024  Primary Care Physician: Eric Espinoza MD    Presenting Problem/History of Present Illness:  Left arm pain    Final Discharge Diagnoses:  Active Hospital Problems    Diagnosis     **Humeral shaft fracture     Essential hypertension     Class 1 obesity due to excess calories with serious comorbidity and body mass index (BMI) of 31.0 to 31.9 in adult     Type  2 diabetes mellitus with hyperglycemia, without long-term current use of insulin        Consults: Orthopedic surgery-Dr. Kent    Procedures Performed: None    Pertinent Test Results:       Imaging Results (All)       Procedure Component Value Units Date/Time    XR Shoulder 2+ View Left [[id number]] Collected: 08/18/24 0813     Updated: 08/18/24 0818    Narrative:      EXAM: XR SHOULDER 2+ VW LEFT-      INDICATION: Fall on left shoulder with concerns for fracture versus  dislocation; S42.352A-Displaced comminuted fracture of shaft of humerus,  left arm, initial encounter for closed fracture      COMPARISON: None.     TECHNIQUE: 2 views of the left humerus was obtained.     FINDINGS:     Displaced spiral fracture involving the mid to proximal left humerus  metadiaphysis. There is a cortical lucency involving the greater  tuberosity. Glenohumeral joint appears preserved. AC joint is preserved.  Soft tissues are grossly unremarkable.       Impression:         Displaced spiral fracture involving the mid to proximal left humeral  metadiaphysis. Cortical lucency involving the greater tuberosity is  suspicious for humeral head extension.        This report was signed and finalized on 8/18/2024 8:15 AM by Rd Bynum.       XR Humerus Left [[id number]] Collected: 08/18/24 0811     Updated: 08/18/24 0816    Narrative:      EXAM: XR HUMERUS LEFT-      INDICATION: For humeral shaft fracture with severe pain and tenderness  after fall; S42.352A-Displaced comminuted fracture of shaft of humerus,  left arm, initial encounter for closed fracture      COMPARISON: None.     TECHNIQUE: 2 views of the left humerus was obtained.     FINDINGS:     Displaced spiral fracture involving the left humeral diaphysis. There is  some mild cortical irregularity involving the greater tuberosity. Soft  tissues are grossly unremarkable.       Impression:         Displaced spiral fracture involving the left humeral diaphysis. There is  some cortical  irregularity/lucency involving the greater tuberosity  which may suggest humeral head extension.        This report was signed and finalized on 8/18/2024 8:13 AM by Rd Bynum.       CT Head Without Contrast [[id number]] Collected: 08/18/24 0639     Updated: 08/18/24 0644    Narrative:      Exam: CT HEAD WO CONTRAST- 8/18/2024 12:34 AM     HISTORY: Fall with distracting injury; S42.352A-Displaced comminuted  fracture of shaft of humerus, left arm, initial encounter for closed  fracture       DOSE LENGTH PRODUCT: 942.33 mGy.cm mGy cm. Automated exposure control  was also utilized to decrease patient radiation dose.     Technique:  Helically acquired CT of the brain without IV contrast was performed.  Sagittal and coronal reformations are also provided for review. Soft  tissue and bone kernels are available for interpretation.     Comparison: Brain MRI 4/5/2024.     Findings:     Ventricles and extra-axial CSF spaces are normal in size.     No intraparenchymal or extra-axial hemorrhage.     Gray-white matter differentiation is preserved.     Orbits are grossly unremarkable. Paranasal sinuses are grossly clear.  Mastoid air cells are grossly clear.     No suspicious calvarial or extracranial soft tissue abnormality.     Other:None.       Impression:      Impression:       No acute intracranial abnormality.     These findings are in agreement with the critical and emergent findings  from the [facility] preliminary report.     This report was signed and finalized on 8/18/2024 6:41 AM by Rd Bynum.       CT Cervical Spine Without Contrast [[id number]] Collected: 08/18/24 0637     Updated: 08/18/24 0641    Narrative:      Exam: CT CERVICAL SPINE WO CONTRAST- 8/18/2024 12:34 AM     HISTORY: Fall with distracting injury; S42.352A-Displaced comminuted  fracture of shaft of humerus, left arm, initial encounter for closed  fracture     COMPARISON: None      DOSE LENGTH PRODUCT: 942.33 mGy.cm mGy cm. Automated exposure  control  was also utilized to decrease patient radiation dose.     TECHNIQUE: Serial helical tomographic images of the cervical spine were  obtained without the use of intravenous contrast. Additionally, sagittal  and coronal reformatted images were also provided for review.     FINDINGS:     Minimal degenerative anterolisthesis of C4-C5.     No acute fracture.     The vertebral body heights are preserved.     Mild degenerative disc disease with mild disc height loss at C5-C6 and  C6-C7.     Mild facet and uncovertebral hypertrophic changes.     No visible high-grade spinal canal narrowing. No visible high-grade bony  foraminal narrowing.     Soft tissues are grossly unremarkable.     Other:None       Impression:         No acute fracture or traumatic malalignment.     Mild degenerative changes as above.     These findings are in agreement with the critical and emergent findings  from the [facility] preliminary report.           This report was signed and finalized on 8/18/2024 6:38 AM by Rd Bynum.             LAB RESULTS:      Lab 08/18/24  0438 08/18/24  0107   WBC 11.66* 10.39   HEMOGLOBIN 12.4 14.1   HEMATOCRIT 35.7 39.3   PLATELETS 258 274   NEUTROS ABS 9.48* 8.01*   IMMATURE GRANS (ABS) 0.05 0.04   LYMPHS ABS 1.27 1.38   MONOS ABS 0.80 0.77   EOS ABS 0.03 0.15   MCV 90.6 90.1   PROTIME  --  12.0   APTT  --  28.2         Lab 08/18/24  0239   SODIUM 136   POTASSIUM 5.1   CHLORIDE 100   CO2 23.0   ANION GAP 13.0   BUN 23   CREATININE 0.73   EGFR 87.5   GLUCOSE 253*   CALCIUM 9.0   MAGNESIUM 2.0         Lab 08/18/24  0239   TOTAL PROTEIN 6.9   ALBUMIN 4.2   GLOBULIN 2.7   ALT (SGPT) 17   AST (SGOT) 41*   BILIRUBIN 0.2   ALK PHOS 156*         Lab 08/18/24  0107   PROTIME 12.0   INR 0.86*             Lab 08/18/24  0337   ABO TYPING O   RH TYPING Negative   ANTIBODY SCREEN Negative         Brief Urine Lab Results  (Last result in the past 365 days)        Color   Clarity   Blood   Leuk Est   Nitrite   Protein    CREAT   Urine HCG        07/16/24 0950 Yellow   Clear   Negative   Negative   Negative   Negative                 Microbiology Results (last 10 days)       Procedure Component Value - Date/Time    Stool Culture (Reference Lab) - Stool, Per Rectum [[id number]] Collected: 08/09/24 1652    Lab Status: Final result Specimen: Stool from Per Rectum Updated: 08/16/24 1411     Salmonella/Shigella Screen Final report     Result 1 Comment     Comment: No Salmonella or Shigella recovered.        Campylobacter Culture Final report     Result 1 Comment     Comment: No Campylobacter species isolated.        E coli, Shiga toxin Assay Negative    Narrative:      Performed at:   - [address]  [id number]  : Uriah Roach PhD, Phone:  [phone]    Clostridioides difficile Toxin, PCR - Stool, Per Rectum [[id number]]  (Normal) Collected: 08/09/24 1652    Lab Status: Final result Specimen: Stool from Per Rectum Updated: 08/09/24 1755     Toxigenic C. difficile by PCR Negative    Narrative:      The result indicates the absence of toxigenic C. difficile from stool specimen.           Microbiology Results (last 10 days)       Procedure Component Value - Date/Time    Stool Culture (Reference Lab) - Stool, Per Rectum [[id number]] Collected: 08/09/24 1652    Lab Status: Final result Specimen: Stool from Per Rectum Updated: 08/16/24 1411     Salmonella/Shigella Screen Final report     Result 1 Comment     Comment: No Salmonella or Shigella recovered.        Campylobacter Culture Final report     Result 1 Comment     Comment: No Campylobacter species isolated.        E coli, Shiga toxin Assay Negative    Narrative:      Performed at:  [address]  [id number]  : Uriah Roach PhD, Phone:  [phone]    Clostridioides difficile Toxin, PCR - Stool, Per Rectum [[id number]]  (Normal) Collected: 08/09/24 1652    Lab Status: Final result Specimen:  Stool from Per Rectum Updated: 08/09/24 1755     Toxigenic C. difficile by PCR Negative    Narrative:      The result indicates the absence of toxigenic C. difficile from stool specimen.              Documented weights    08/18/24 0036 08/18/24 0425   Weight: 74.4 kg (164 lb) 75 kg (165 lb 4.8 oz)        Hospital Course:   Pauline Molina is a 74-year-old female with past medical history of hypertension, obesity, type 2 diabetes, headaches, high glycerides, panic attacks, anxiety, esophageal dysphagia, elevated LFTs, and valve prolapse.  Patient presents to [facility] emergency department on 8/18/2024 after a fall stumbling over her grandchildren's toys in the living room.  No reports of loss of consciousness or head injury.  X-rays revealed a X-rays demonstrated a long oblique comminuted humeral fracture of the left side.  CT of the head and cervical spine were negative for acute changes and labs were unremarkable.  Patient was admitted for overnight observation and consultation with orthopedic surgery in the a.m.      Humeral shaft fracture-presented to [facility] emergency department on 8/17/2024 after a fall tripping over her grandchildren's toys.  X-rays demonstrated a long oblique comminuted humeral fracture.  Dr. Kent, orthopedic surgery evaluated the patient with recommendations for placement of a over the shoulder Bosch fracture orthosis today with follow-up in 10 to 14 days.  No surgical intervention at this time.  Patient was instructed to remain nonweightbearing on left upper extremity and further recommendations per Dr. Kent after orthotic placement.    Essential hypertension-extensive discussion with the patient regarding her medication tolerance and compliance. BP upon admission was 141/61.  Continued home Norvasc and metoprolol however at 5 mg daily and Toprol-XL 25 mg daily.  His vital signs have remained stable with changes.  Patient was instructed to keep a blood pressure diary and bring to her  "follow-up appointment with Dr. Espinoza.     Type 2 diabetes mellitus with hyperglycemia, without long-term current use of insulin-patient received low-dose sliding scale insulin with Accu-Cheks before meals and at bedtime hospitalization.  Metformin was continued at discharge.  Last A1c 6.7, on 6/3/2024     Today patient is resting comfortably in bed on room air with her  at bedside.  Patient states she attempted to try the Ultram and it did not relieve her pain as much but has been able to tolerate pain with as needed Norco which will be prescribed for 72 hours post discharge.  Patient states no new numbness or tingling in her left upper extremity.  Patient has no additional complaints of nausea, vomiting, or dizziness.  Patient will be discharged post orthotic placement per Dr. Kent later on this morning.  Again discussed the changes we made to her blood pressure medicine and requested that she keep a blood pressure diary for 1 week to bring to her follow-up with Dr. Espinoza to discuss if alterations remain appropriate.  All patient's questions were answered to the best my ability and her and her  are agreeable for discharge home today.  Patient has been evaluated today 08/19/24 and is stable for discharge. Patient agrees with plan to discharge on 08/19/24    Physical Exam on Discharge:  /58 (BP Location: Right arm, Patient Position: Lying)   Pulse 60   Temp 98.6 °F (37 °C) (Oral)   Resp 16   Ht 152.4 cm (60\")   Wt 75 kg (165 lb 4.8 oz)   SpO2 96%   BMI 32.28 kg/m²   Physical Exam  Vitals and nursing note reviewed.   Constitutional:       Appearance: She is obese.      Comments: Patient is resting comfortably in bed on room air, with her  at bedside.   HENT:      Right Ear: Tympanic membrane normal.      Nose: Nose normal.      Mouth/Throat:      Mouth: Mucous membranes are moist.      Pharynx: Oropharynx is clear.   Eyes:      Pupils: Pupils are equal, round, and reactive to " light.   Cardiovascular:      Rate and Rhythm: Normal rate and regular rhythm.      Pulses: Normal pulses.      Heart sounds: Normal heart sounds.   Pulmonary:      Effort: No tachypnea, accessory muscle usage or respiratory distress.      Breath sounds: Examination of the right-middle field reveals decreased breath sounds. Examination of the left-middle field reveals decreased breath sounds. Examination of the right-lower field reveals decreased breath sounds. Examination of the left-lower field reveals decreased breath sounds. Decreased breath sounds present.   Abdominal:      General: Abdomen is protuberant. Bowel sounds are normal.      Tenderness: There is no abdominal tenderness.   Genitourinary:     Comments: Voiding per bathroom privileges  Musculoskeletal:      Cervical back: Normal range of motion.      Comments: Left arm tenderness, no numbness or tingling present in left hand.  Patient additionally has chronic right leg cramps   Skin:     General: Skin is warm.      Capillary Refill: Capillary refill takes less than 2 seconds.      Coloration: Skin is pale.   Neurological:      General: No focal deficit present.   Psychiatric:         Mood and Affect: Mood normal.         Speech: Speech is tangential.         Behavior: Behavior normal. Behavior is cooperative.         Thought Content: Thought content normal.         Judgment: Judgment normal.         Condition on Discharge: Stable for discharge home    Discharge Disposition:  Home or Self Care    Discharge Medications:     Discharge Medications        New Medications        Instructions Start Date   cyclobenzaprine 5 MG tablet  Commonly known as: FLEXERIL   5 mg, Oral, 3 Times Daily PRN      metoprolol succinate XL 25 MG 24 hr tablet  Commonly known as: TOPROL-XL   25 mg, Oral, Every 24 Hours Scheduled             Changes to Medications        Instructions Start Date   amLODIPine 5 MG tablet  Commonly known as: NORVASC  What changed:   medication  strength  when to take this  Another medication with the same name was removed. Continue taking this medication, and follow the directions you see here.   5 mg, Oral, Every 24 Hours Scheduled             Continue These Medications        Instructions Start Date   BERBERINE COMPLEX PO   1 capsule, Oral, Daily      CoQ-10 100 MG capsule   1 capsule, Oral, Daily      estradiol 0.1 MG/GM vaginal cream  Commonly known as: ESTRACE   2 applicators, Vaginal, 3 Times Weekly      famotidine 20 MG tablet  Commonly known as: Pepcid   20 mg, Oral, 2 Times Daily      losartan 100 MG tablet  Commonly known as: COZAAR   100 mg, Oral, Daily      magnesium oxide 400 MG tablet  Commonly known as: MAG-OX   400 mg, Oral, Daily      metFORMIN  MG 24 hr tablet  Commonly known as: GLUCOPHAGE-XR   500 mg, Oral, 2 Times Daily      sennosides-docusate 8.6-50 MG per tablet  Commonly known as: Senokot S   1 tablet, Oral, 2 Times Daily, Take 1 tablet by mouth twice daily for 3 days, then decrease to 1 tablet once daily      vitamin B-12 1000 MCG tablet  Commonly known as: CYANOCOBALAMIN   1,000 mcg, Oral, Daily, Spray       vitamin C 250 MG tablet  Commonly known as: ASCORBIC ACID   250 mg, Oral, Daily      VITAMIN D-VITAMIN K PO   1 tablet, Oral, Daily      Vitamin-B Complex tablet   1 tablet, Oral, Daily             Stop These Medications      acetaminophen 325 MG tablet  Commonly known as: TYLENOL     metoprolol tartrate 25 MG tablet  Commonly known as: LOPRESSOR                Discharge Diet:   Diet Instructions       Diet: Cardiac Diets, Diabetic Diets; Healthy Heart (2-3 Na+); Regular (IDDSI 7); Thin (IDDSI 0); Consistent Carbohydrate      Discharge Diet:  Cardiac Diets  Diabetic Diets       Cardiac Diet: Healthy Heart (2-3 Na+)    Texture: Regular (IDDSI 7)    Fluid Consistency: Thin (IDDSI 0)    Diabetic Diet: Consistent Carbohydrate            Activity at Discharge:   Activity Instructions       Activity as Tolerated      Lifting  "Restrictions      Type of Restriction: Lifting    Lifting Restrictions: Other    Explain Lifing Restrictions: None weightbearing, left upper extremity.  Additional instructions post orthotic placement            Discharge Instructions:   1.  Patient is instructed to return for medical attention for any new or worsening arm pain, numbness or tingling, or discoloration of hand.  2.  Patient is to follow-up with PCP in 1 week.  3.  Patient is to follow-up with Dr. Kent in the office to recheck an x-ray in in 10 to 14 days.  4.  Patient was instructed nonweightbearing on left upper extremity and additional instructions post orthotic placement per Dr. Kent.  If.  Patient was instructed on the changes made to her metoprolol and Norvasc.  I requested the patient keep a blood pressure diary for 1 week and bring to her follow-up appointment with Dr. Espinoza to discuss changes.    Follow-up Appointments:   Future Appointments   Date Time Provider Department Center   9/10/2024  2:00 PM Taniya Tomlinson MD [facility] OBG PAD PAD   2024  1:00 PM Yuni Sanchez APRN [facility] PC VSQ PAD   10/29/2024 10:30 AM Kiara Ngo MD [facility] N PAD PAD   2025  1:45 PM PAD [facility] 1 BH PAD MA BI PAD   2025 11:30 AM Le Silverman APRN [facility] U PAD PAD       Test Results Pending at Discharge: None    Electronically signed by ARNALDO Goode, 24, 07:09 CDT.    Time: 35 minutes.          Electronically signed by Nella Mo APRN at 24 0715       [address] [address]  Dept. Phone:  [phone]  Dept. Fax:  [phone] Date Ordered: Aug 19, 2024         Patient:  Pauline Molina MRN:  [id number]   [address] [place] [id number] :  1950  SSN:    Phone: [phone] Sex:  F     Weight: 75 kg (165 lb 4.8 oz)         Ht Readings from Last 1 Encounters:   24 152.4 cm (60\")         Commode Chair  Bedside Commode with Fixed Arms     "  (Order ID: [id number])    Diagnosis:  Closed nondisplaced comminuted fracture of shaft of left humerus, initial encounter (S42.355A [ICD-10-CM] 812.21 [ICD-9-CM])   Quantity:  1     Reason for Commode: Confined to Single Room  Reason for Confinement: Humerus fracture  Type:  Bedside Commode with Fixed Arms  Length of Need: 99 Months = Lifetime        Authorizing Provider's Phone: [phone]  Authorizing Provider:Nella Mo APRN  Authorizing Provider's NPI: [id number]  Order Entered By: Nella Mo APRN 8/19/2024 10:52 AM     Electronically signed by: Nella Mo APRN 8/19/2024 10:52 AM

## 2024-08-19 NOTE — CASE MANAGEMENT/SOCIAL WORK
Continued Stay Note  [facility]     Patient Name: Pauline Molina  MRN: [id number]  Today's Date: 8/19/2024    Admit Date: 8/18/2024    Plan: Home   Discharge Plan       Row Name 08/19/24 1124       Plan    Plan Home    Patient/Family in Agreement with Plan yes    Final Discharge Disposition Code 01 - home or self-care    Final Note Pt is being discharged home today and has order for [facility]SC, has preference for [employer] in [place] saying they will  on way home today. Spoke with Camille from [employer] [phone] and faxed order to her at [phone]. She was informed to call this SW back if fax not received.      Row Name 08/19/24 0908       Plan    Final Discharge Disposition Code 01 - home or self-care                   Discharge Codes    No documentation.                 Expected Discharge Date and Time       Expected Discharge Date Expected Discharge Time    Aug 19, 2024               LILLIANA Sr

## 2024-08-19 NOTE — OUTREACH NOTE
Prep Survey      Flowsheet Row Responses   [facility] patient discharged from? [place]   Is LACE score < 7 ? Yes   Eligibility [facility]   Date of Admission 08/18/24   Date of Discharge 08/19/24   Discharge Disposition Home or Self Care   Discharge diagnosis Humeral shaft fracture,   Does the patient have one of the following disease processes/diagnoses(primary or secondary)? Other   Does the patient have Home health ordered? No   Is there a DME ordered? Yes   What DME was ordered? [facility]   Prep survey completed? Yes            Brittany MORILLO - Registered Nurse

## 2024-08-19 NOTE — DISCHARGE SUMMARY
[facility]  DISCHARGE SUMMARY       Date of Admission: 8/18/2024  Date of Discharge:  8/19/2024  Primary Care Physician: Eric Espinoza MD    Presenting Problem/History of Present Illness:  Left arm pain    Final Discharge Diagnoses:  Active Hospital Problems    Diagnosis     **Humeral shaft fracture     Essential hypertension     Class 1 obesity due to excess calories with serious comorbidity and body mass index (BMI) of 31.0 to 31.9 in adult     Type 2 diabetes mellitus with hyperglycemia, without long-term current use of insulin        Consults: Orthopedic surgery-Dr. Kent    Procedures Performed: None    Pertinent Test Results:       Imaging Results (All)       Procedure Component Value Units Date/Time    XR Shoulder 2+ View Left [[id number]] Collected: 08/18/24 0813     Updated: 08/18/24 0818    Narrative:      EXAM: XR SHOULDER 2+ VW LEFT-      INDICATION: Fall on left shoulder with concerns for fracture versus  dislocation; S42.352A-Displaced comminuted fracture of shaft of humerus,  left arm, initial encounter for closed fracture      COMPARISON: None.     TECHNIQUE: 2 views of the left humerus was obtained.     FINDINGS:     Displaced spiral fracture involving the mid to proximal left humerus  metadiaphysis. There is a cortical lucency involving the greater  tuberosity. Glenohumeral joint appears preserved. AC joint is preserved.  Soft tissues are grossly unremarkable.       Impression:         Displaced spiral fracture involving the mid to proximal left humeral  metadiaphysis. Cortical lucency involving the greater tuberosity is  suspicious for humeral head extension.        This report was signed and finalized on 8/18/2024 8:15 AM by Rd Bynum.       XR Humerus Left [[id number]] Collected: 08/18/24 0811     Updated: 08/18/24 0816    Narrative:      EXAM: XR HUMERUS LEFT-      INDICATION: For humeral shaft fracture with severe pain and  tenderness  after fall; S42.352A-Displaced comminuted fracture of shaft of humerus,  left arm, initial encounter for closed fracture      COMPARISON: None.     TECHNIQUE: 2 views of the left humerus was obtained.     FINDINGS:     Displaced spiral fracture involving the left humeral diaphysis. There is  some mild cortical irregularity involving the greater tuberosity. Soft  tissues are grossly unremarkable.       Impression:         Displaced spiral fracture involving the left humeral diaphysis. There is  some cortical irregularity/lucency involving the greater tuberosity  which may suggest humeral head extension.        This report was signed and finalized on 8/18/2024 8:13 AM by Rd Bynum.       CT Head Without Contrast [[id number]] Collected: 08/18/24 0639     Updated: 08/18/24 0644    Narrative:      Exam: CT HEAD WO CONTRAST- 8/18/2024 12:34 AM     HISTORY: Fall with distracting injury; S42.352A-Displaced comminuted  fracture of shaft of humerus, left arm, initial encounter for closed  fracture       DOSE LENGTH PRODUCT: 942.33 mGy.cm mGy cm. Automated exposure control  was also utilized to decrease patient radiation dose.     Technique:  Helically acquired CT of the brain without IV contrast was performed.  Sagittal and coronal reformations are also provided for review. Soft  tissue and bone kernels are available for interpretation.     Comparison: Brain MRI 4/5/2024.     Findings:     Ventricles and extra-axial CSF spaces are normal in size.     No intraparenchymal or extra-axial hemorrhage.     Gray-white matter differentiation is preserved.     Orbits are grossly unremarkable. Paranasal sinuses are grossly clear.  Mastoid air cells are grossly clear.     No suspicious calvarial or extracranial soft tissue abnormality.     Other:None.       Impression:      Impression:       No acute intracranial abnormality.     These findings are in agreement with the critical and emergent findings  from the [facility]  preliminary report.     This report was signed and finalized on 8/18/2024 6:41 AM by Rd Bynum.       CT Cervical Spine Without Contrast [[id number]] Collected: 08/18/24 0637     Updated: 08/18/24 0641    Narrative:      Exam: CT CERVICAL SPINE WO CONTRAST- 8/18/2024 12:34 AM     HISTORY: Fall with distracting injury; S42.352A-Displaced comminuted  fracture of shaft of humerus, left arm, initial encounter for closed  fracture     COMPARISON: None      DOSE LENGTH PRODUCT: 942.33 mGy.cm mGy cm. Automated exposure control  was also utilized to decrease patient radiation dose.     TECHNIQUE: Serial helical tomographic images of the cervical spine were  obtained without the use of intravenous contrast. Additionally, sagittal  and coronal reformatted images were also provided for review.     FINDINGS:     Minimal degenerative anterolisthesis of C4-C5.     No acute fracture.     The vertebral body heights are preserved.     Mild degenerative disc disease with mild disc height loss at C5-C6 and  C6-C7.     Mild facet and uncovertebral hypertrophic changes.     No visible high-grade spinal canal narrowing. No visible high-grade bony  foraminal narrowing.     Soft tissues are grossly unremarkable.     Other:None       Impression:         No acute fracture or traumatic malalignment.     Mild degenerative changes as above.     These findings are in agreement with the critical and emergent findings  from the [facility] preliminary report.           This report was signed and finalized on 8/18/2024 6:38 AM by Rd Bynum.             LAB RESULTS:      Lab 08/18/24  0438 08/18/24  0107   WBC 11.66* 10.39   HEMOGLOBIN 12.4 14.1   HEMATOCRIT 35.7 39.3   PLATELETS 258 274   NEUTROS ABS 9.48* 8.01*   IMMATURE GRANS (ABS) 0.05 0.04   LYMPHS ABS 1.27 1.38   MONOS ABS 0.80 0.77   EOS ABS 0.03 0.15   MCV 90.6 90.1   PROTIME  --  12.0   APTT  --  28.2         Lab 08/18/24  0239   SODIUM 136   POTASSIUM 5.1   CHLORIDE 100   CO2 23.0    ANION GAP 13.0   BUN 23   CREATININE 0.73   EGFR 87.5   GLUCOSE 253*   CALCIUM 9.0   MAGNESIUM 2.0         Lab 08/18/24  0239   TOTAL PROTEIN 6.9   ALBUMIN 4.2   GLOBULIN 2.7   ALT (SGPT) 17   AST (SGOT) 41*   BILIRUBIN 0.2   ALK PHOS 156*         Lab 08/18/24  0107   PROTIME 12.0   INR 0.86*             Lab 08/18/24  0337   ABO TYPING O   RH TYPING Negative   ANTIBODY SCREEN Negative         Brief Urine Lab Results  (Last result in the past 365 days)        Color   Clarity   Blood   Leuk Est   Nitrite   Protein   CREAT   Urine HCG        07/16/24 0950 Yellow   Clear   Negative   Negative   Negative   Negative                 Microbiology Results (last 10 days)       Procedure Component Value - Date/Time    Stool Culture (Reference Lab) - Stool, Per Rectum [[id number]] Collected: 08/09/24 1652    Lab Status: Final result Specimen: Stool from Per Rectum Updated: 08/16/24 1411     Salmonella/Shigella Screen Final report     Result 1 Comment     Comment: No Salmonella or Shigella recovered.        Campylobacter Culture Final report     Result 1 Comment     Comment: No Campylobacter species isolated.        E coli, Shiga toxin Assay Negative    Narrative:      Performed at:  [address] [place] [address]  [id number]  : Uriah Roach PhD, Phone:  [phone]    Clostridioides difficile Toxin, PCR - Stool, Per Rectum [[id number]]  (Normal) Collected: 08/09/24 1652    Lab Status: Final result Specimen: Stool from Per Rectum Updated: 08/09/24 1755     Toxigenic C. difficile by PCR Negative    Narrative:      The result indicates the absence of toxigenic C. difficile from stool specimen.           Microbiology Results (last 10 days)       Procedure Component Value - Date/Time    Stool Culture (Reference Lab) - Stool, Per Rectum [[id number]] Collected: 08/09/24 1652    Lab Status: Final result Specimen: Stool from Per Rectum Updated: 08/16/24 1411     Salmonella/Shigella Screen Final report      Result 1 Comment     Comment: No Salmonella or Shigella recovered.        Campylobacter Culture Final report     Result 1 Comment     Comment: No Campylobacter species isolated.        E coli, Shiga toxin Assay Negative    Narrative:      Performed at:   - [address]  [id number]  : Uriah Roach PhD, Phone:  [phone]    Clostridioides difficile Toxin, PCR - Stool, Per Rectum [[id number]]  (Normal) Collected: 08/09/24 1652    Lab Status: Final result Specimen: Stool from Per Rectum Updated: 08/09/24 1755     Toxigenic C. difficile by PCR Negative    Narrative:      The result indicates the absence of toxigenic C. difficile from stool specimen.              Documented weights    08/18/24 0036 08/18/24 0425   Weight: 74.4 kg (164 lb) 75 kg (165 lb 4.8 oz)        Hospital Course:   Pauline Molina is a 74-year-old female with past medical history of hypertension, obesity, type 2 diabetes, headaches, high glycerides, panic attacks, anxiety, esophageal dysphagia, elevated LFTs, and valve prolapse.  Patient presents to [facility] emergency department on 8/18/2024 after a fall stumbling over her grandchildren's toys in the living room.  No reports of loss of consciousness or head injury.  X-rays revealed a X-rays demonstrated a long oblique comminuted humeral fracture of the left side.  CT of the head and cervical spine were negative for acute changes and labs were unremarkable.  Patient was admitted for overnight observation and consultation with orthopedic surgery in the a.m.      Humeral shaft fracture-presented to [facility] emergency department on 8/17/2024 after a fall tripping over her grandchildren's toys.  X-rays demonstrated a long oblique comminuted humeral fracture.  Dr. Kent, orthopedic surgery evaluated the patient with recommendations for placement of a over the shoulder Bosch fracture orthosis today with follow-up in 10 to 14 days.  No surgical intervention  at this time.  Patient was instructed to remain nonweightbearing on left upper extremity and further recommendations per Dr. Kent after orthotic placement.    Essential hypertension-extensive discussion with the patient regarding her medication tolerance and compliance. BP upon admission was 141/61.  Continued home Norvasc and metoprolol however at 5 mg daily and Toprol-XL 25 mg daily.  His vital signs have remained stable with changes.  Patient was instructed to keep a blood pressure diary and bring to her follow-up appointment with Dr. Espinoza.     Type 2 diabetes mellitus with hyperglycemia, without long-term current use of insulin-patient received low-dose sliding scale insulin with Accu-Cheks before meals and at bedtime hospitalization.  Metformin was continued at discharge.  Last A1c 6.7, on 6/3/2024     Today patient is resting comfortably in bed on room air with her  at bedside.  Patient states she attempted to try the Ultram and it did not relieve her pain as much but has been able to tolerate pain with as needed Norco which will be prescribed for 72 hours post discharge.  Patient states no new numbness or tingling in her left upper extremity.  Patient has no additional complaints of nausea, vomiting, or dizziness.  Patient will be discharged post orthotic placement per Dr. Kent later on this morning.  Again discussed the changes we made to her blood pressure medicine and requested that she keep a blood pressure diary for 1 week to bring to her follow-up with Dr. Espinoza to discuss if alterations remain appropriate.  All patient's questions were answered to the best my ability and her and her  are agreeable for discharge home today.  Patient has been evaluated today 08/19/24 and is stable for discharge. Patient agrees with plan to discharge on 08/19/24    Physical Exam on Discharge:  /58 (BP Location: Right arm, Patient Position: Lying)   Pulse 60   Temp 98.6 °F (37 °C) (Oral)   Resp  "16   Ht 152.4 cm (60\")   Wt 75 kg (165 lb 4.8 oz)   SpO2 96%   BMI 32.28 kg/m²   Physical Exam  Vitals and nursing note reviewed.   Constitutional:       Appearance: She is obese.      Comments: Patient is resting comfortably in bed on room air, with her  at bedside.   HENT:      Right Ear: Tympanic membrane normal.      Nose: Nose normal.      Mouth/Throat:      Mouth: Mucous membranes are moist.      Pharynx: Oropharynx is clear.   Eyes:      Pupils: Pupils are equal, round, and reactive to light.   Cardiovascular:      Rate and Rhythm: Normal rate and regular rhythm.      Pulses: Normal pulses.      Heart sounds: Normal heart sounds.   Pulmonary:      Effort: No tachypnea, accessory muscle usage or respiratory distress.      Breath sounds: Examination of the right-middle field reveals decreased breath sounds. Examination of the left-middle field reveals decreased breath sounds. Examination of the right-lower field reveals decreased breath sounds. Examination of the left-lower field reveals decreased breath sounds. Decreased breath sounds present.   Abdominal:      General: Abdomen is protuberant. Bowel sounds are normal.      Tenderness: There is no abdominal tenderness.   Genitourinary:     Comments: Voiding per bathroom privileges  Musculoskeletal:      Cervical back: Normal range of motion.      Comments: Left arm tenderness, no numbness or tingling present in left hand.  Patient additionally has chronic right leg cramps   Skin:     General: Skin is warm.      Capillary Refill: Capillary refill takes less than 2 seconds.      Coloration: Skin is pale.   Neurological:      General: No focal deficit present.   Psychiatric:         Mood and Affect: Mood normal.         Speech: Speech is tangential.         Behavior: Behavior normal. Behavior is cooperative.         Thought Content: Thought content normal.         Judgment: Judgment normal.         Condition on Discharge: Stable for discharge " home    Discharge Disposition:  Home or Self Care    Discharge Medications:     Discharge Medications        New Medications        Instructions Start Date   cyclobenzaprine 5 MG tablet  Commonly known as: FLEXERIL   5 mg, Oral, 3 Times Daily PRN      metoprolol succinate XL 25 MG 24 hr tablet  Commonly known as: TOPROL-XL   25 mg, Oral, Every 24 Hours Scheduled             Changes to Medications        Instructions Start Date   amLODIPine 5 MG tablet  Commonly known as: NORVASC  What changed:   medication strength  when to take this  Another medication with the same name was removed. Continue taking this medication, and follow the directions you see here.   5 mg, Oral, Every 24 Hours Scheduled             Continue These Medications        Instructions Start Date   BERBERINE COMPLEX PO   1 capsule, Oral, Daily      CoQ-10 100 MG capsule   1 capsule, Oral, Daily      estradiol 0.1 MG/GM vaginal cream  Commonly known as: ESTRACE   2 applicators, Vaginal, 3 Times Weekly      famotidine 20 MG tablet  Commonly known as: Pepcid   20 mg, Oral, 2 Times Daily      losartan 100 MG tablet  Commonly known as: COZAAR   100 mg, Oral, Daily      magnesium oxide 400 MG tablet  Commonly known as: MAG-OX   400 mg, Oral, Daily      metFORMIN  MG 24 hr tablet  Commonly known as: GLUCOPHAGE-XR   500 mg, Oral, 2 Times Daily      sennosides-docusate 8.6-50 MG per tablet  Commonly known as: Senokot S   1 tablet, Oral, 2 Times Daily, Take 1 tablet by mouth twice daily for 3 days, then decrease to 1 tablet once daily      vitamin B-12 1000 MCG tablet  Commonly known as: CYANOCOBALAMIN   1,000 mcg, Oral, Daily, Spray       vitamin C 250 MG tablet  Commonly known as: ASCORBIC ACID   250 mg, Oral, Daily      VITAMIN D-VITAMIN K PO   1 tablet, Oral, Daily      Vitamin-B Complex tablet   1 tablet, Oral, Daily             Stop These Medications      acetaminophen 325 MG tablet  Commonly known as: TYLENOL     metoprolol tartrate 25 MG  tablet  Commonly known as: LOPRESSOR                Discharge Diet:   Diet Instructions       Diet: Cardiac Diets, Diabetic Diets; Healthy Heart (2-3 Na+); Regular (IDDSI 7); Thin (IDDSI 0); Consistent Carbohydrate      Discharge Diet:  Cardiac Diets  Diabetic Diets       Cardiac Diet: Healthy Heart (2-3 Na+)    Texture: Regular (IDDSI 7)    Fluid Consistency: Thin (IDDSI 0)    Diabetic Diet: Consistent Carbohydrate            Activity at Discharge:   Activity Instructions       Activity as Tolerated      Lifting Restrictions      Type of Restriction: Lifting    Lifting Restrictions: Other    Explain Lifing Restrictions: None weightbearing, left upper extremity.  Additional instructions post orthotic placement            Discharge Instructions:   1.  Patient is instructed to return for medical attention for any new or worsening arm pain, numbness or tingling, or discoloration of hand.  2.  Patient is to follow-up with PCP in 1 week.  3.  Patient is to follow-up with Dr. Kent in the office to recheck an x-ray in in 10 to 14 days.  4.  Patient was instructed nonweightbearing on left upper extremity and additional instructions post orthotic placement per Dr. Kent.  If.  Patient was instructed on the changes made to her metoprolol and Norvasc.  I requested the patient keep a blood pressure diary for 1 week and bring to her follow-up appointment with Dr. Espinoza to discuss changes.    Follow-up Appointments:   Future Appointments   Date Time Provider Department Center   9/10/2024  2:00 PM Taniya Tomlinson MD [facility] OBG PAD PAD   9/19/2024  1:00 PM Yuni Sanchez APRN [facility] PC VSQ PAD   10/29/2024 10:30 AM Kiara Ngo MD [facility] N PAD PAD   1/9/2025  1:45 PM PAD [facility] 1 BH PAD MA BI PAD   1/16/2025 11:30 AM Le Silverman APRN [facility] U PAD PAD       Test Results Pending at Discharge: None    Electronically signed by ARNALDO Goode, 08/19/24, 07:09 CDT.    Time: 35 minutes.

## 2024-08-19 NOTE — PROGRESS NOTES
Orthopaedic Surgery Progress Note      8/19/2024   08:54 CDT    Name:  Pauline Molina  MRN:    [id number]     Acct:     [id number]   Room:  [address] [place] [address] Day: 0     Admit Date: 8/18/2024  PCP: Eric Espinoza MD        Subjective:     C/C: Left humerus fracture    Interval History: Status: remained stable. No new complaints today.  Pain worsened with motion.  Planning for continued non operative treatment with a Bosch splint.        Medications:     Allergies:   Allergies   Allergen Reactions    Lactose Intolerance (Gi) GI Intolerance     Takes Lactaid       Current Meds:   Current Facility-Administered Medications   Medication Dose Route Frequency Provider Last Rate Last Admin    acetaminophen (TYLENOL) tablet 650 mg  650 mg Oral Q4H PRN Tanvi Zelaya DO        amLODIPine (NORVASC) tablet 5 mg  5 mg Oral Q24H Nella APRN   5 mg at 08/18/24 1239    sennosides-docusate (PERICOLACE) 8.6-50 MG per tablet 2 tablet  2 tablet Oral BID PRN Tanvi Zelaya DO   2 tablet at 08/18/24 0930    And    polyethylene glycol (MIRALAX) packet 17 g  17 g Oral Daily PRN Tanvi Zelaya DO   17 g at 08/18/24 0930    And    bisacodyl (DULCOLAX) EC tablet 5 mg  5 mg Oral Daily PRN Tanvi Zelaya DO        And    bisacodyl (DULCOLAX) suppository 10 mg  10 mg Rectal Daily PRN Tanvi Zelaya DO        cyclobenzaprine (FLEXERIL) tablet 5 mg  5 mg Oral TID PRN Tanvi Zelaya DO   5 mg at 08/19/24 0532    dextrose (D50W) (25 g/50 mL) IV injection 25 g  25 g Intravenous Q15 Min PRN Tanvi Zelaya DO        dextrose (GLUTOSE) oral gel 15 g  15 g Oral Q15 Min PRN Tanvi Zelaya DO        famotidine (PEPCID) injection 20 mg  20 mg Intravenous Q12H Tanvi Zelaya DO   20 mg at 08/18/24 2044    glucagon (GLUCAGEN) injection 1 mg  1 mg Intramuscular Q15 Min PRN Tanvi Zelaya DO        hydrALAZINE (APRESOLINE) injection 10 mg  10 mg Intravenous Q6H PRN Tanvi Zelaya DO         "HYDROcodone-acetaminophen (NORCO) 5-325 MG per tablet 1 tablet  1 tablet Oral Q6H PRN Elaine Randhawa DO   1 tablet at 08/19/24 0327    insulin regular (humuLIN R,novoLIN R) injection 2-7 Units  2-7 Units Subcutaneous Q6H Tanvi Zelaya DO   2 Units at 08/18/24 1248    metoprolol succinate XL (TOPROL-XL) 24 hr tablet 25 mg  25 mg Oral Q24H Nella Mo APRN   25 mg at 08/18/24 1239    ondansetron (ZOFRAN) injection 4 mg  4 mg Intravenous Q6H PRN Tanvi Zelaya DO        sodium chloride 0.9 % flush 10 mL  10 mL Intravenous Q12H Tanvi Zelaya DO   10 mL at 08/18/24 2044    sodium chloride 0.9 % flush 10 mL  10 mL Intravenous PRN Tanvi Zelaya DO        sodium chloride 0.9 % infusion 40 mL  40 mL Intravenous PRN Tanvi Zelaya DO               Data:     Code Status:    Code Status and Medical Interventions: CPR (Attempt to Resuscitate); Full Support   Ordered at: 08/18/24 0405     Level Of Support Discussed With:    Patient     Code Status (Patient has no pulse and is not breathing):    CPR (Attempt to Resuscitate)     Medical Interventions (Patient has pulse or is breathing):    Full Support       Family History   Problem Relation Age of Onset    Heart disease Mother     Prostate cancer Father     Colon polyps Father         > 60 years of age    Breast cancer Maternal Grandmother     Stomach cancer Paternal Grandmother 48    Colon cancer Neg Hx     Esophageal cancer Neg Hx     Liver cancer Neg Hx     Liver disease Neg Hx     Rectal cancer Neg Hx        Social History     Socioeconomic History    Marital status:    Tobacco Use    Smoking status: Never    Smokeless tobacco: Never   Vaping Use    Vaping status: Never Used   Substance and Sexual Activity    Alcohol use: Never    Drug use: Never    Sexual activity: Defer       Vitals:  /59 (BP Location: Right arm, Patient Position: Sitting)   Pulse 63   Temp 98.2 °F (36.8 °C) (Oral)   Resp 18   Ht 152.4 cm (60\")   Wt " 75 kg (165 lb 4.8 oz)   SpO2 91%   BMI 32.28 kg/m²   Temp (24hrs), Av.3 °F (36.8 °C), Min:97.9 °F (36.6 °C), Max:98.6 °F (37 °C)      I/O (24Hr):    Intake/Output Summary (Last 24 hours) at 2024 0854  Last data filed at 2024 1300  Gross per 24 hour   Intake 440 ml   Output --   Net 440 ml       Labs:  Lab Results (last 72 hours)       Procedure Component Value Units Date/Time    POC Glucose Once [[id number]]  (Abnormal) Collected: 24 0536    Specimen: Blood Updated: 24 0547     Glucose 148 mg/dL      Comment: : [id number] Lai MarcyMeter ID: [id number]       POC Glucose Once [[id number]]  (Normal) Collected: 24 0013    Specimen: Blood Updated: 24 0024     Glucose 124 mg/dL      Comment: : [id number] Lai MarcyMeter ID: [id number]       POC Glucose Once [[id number]]  (Normal) Collected: 24 1640    Specimen: Blood Updated: 24 1651     Glucose 127 mg/dL      Comment: : [id number] Guido SolaineMeter ID: [id number]       POC Glucose Once [[id number]]  (Abnormal) Collected: 24 1138    Specimen: Blood Updated: 24 1149     Glucose 183 mg/dL      Comment: : [id number] Guido JasmineMeter ID: [id number]       Magnesium [[id number]]  (Normal) Collected: 24 0239    Specimen: Blood from Arm, Right Updated: 24 0941     Magnesium 2.0 mg/dL     POC Glucose Once [[id number]]  (Abnormal) Collected: 24 0646    Specimen: Blood Updated: 24 0657     Glucose 243 mg/dL      Comment: : [id number] Bhupendra JimmieMeter ID: [id number]       CBC Auto Differential [[id number]]  (Abnormal) Collected: 24 0438    Specimen: Blood Updated: 24 0536     WBC 11.66 10*3/mm3      RBC 3.94 10*6/mm3      Hemoglobin 12.4 g/dL      Hematocrit 35.7 %      MCV 90.6 fL      MCH 31.5 pg      MCHC 34.7 g/dL      RDW 13.2 %      RDW-SD 43.2 fl      MPV 10.3 fL      Platelets 258 10*3/mm3      Neutrophil % 81.2 %      Lymphocyte % 10.9 %      Monocyte  % 6.9 %      Eosinophil % 0.3 %      Basophil % 0.3 %      Immature Grans % 0.4 %      Neutrophils, Absolute 9.48 10*3/mm3      Lymphocytes, Absolute 1.27 10*3/mm3      Monocytes, Absolute 0.80 10*3/mm3      Eosinophils, Absolute 0.03 10*3/mm3      Basophils, Absolute 0.03 10*3/mm3      Immature Grans, Absolute 0.05 10*3/mm3      nRBC 0.0 /100 WBC     Comprehensive Metabolic Panel [[id number]]  (Abnormal) Collected: 08/18/24 0239    Specimen: Blood from Arm, Right Updated: 08/18/24 0318     Glucose 253 mg/dL      BUN 23 mg/dL      Creatinine 0.73 mg/dL      Sodium 136 mmol/L      Potassium 5.1 mmol/L      Comment: Specimen hemolyzed.  Result may be falsely elevated.        Chloride 100 mmol/L      CO2 23.0 mmol/L      Calcium 9.0 mg/dL      Total Protein 6.9 g/dL      Albumin 4.2 g/dL      ALT (SGPT) 17 U/L      Comment: Specimen hemolyzed.  Result may  be falsely elevated.        AST (SGOT) 41 U/L      Comment: Specimen hemolyzed.  Result may be falsely elevated.        Alkaline Phosphatase 156 U/L      Total Bilirubin 0.2 mg/dL      Globulin 2.7 gm/dL      A/G Ratio 1.6 g/dL      BUN/Creatinine Ratio 31.5     Anion Gap 13.0 mmol/L      eGFR 87.5 mL/min/1.73     Narrative:      GFR Normal >60  Chronic Kidney Disease <60  Kidney Failure <15    The GFR formula is only valid for adults with stable renal function between ages 18 and 70.    Protime-INR [[id number]]  (Abnormal) Collected: 08/18/24 0107    Specimen: Blood Updated: 08/18/24 0123     Protime 12.0 Seconds      INR 0.86    aPTT [[id number]]  (Normal) Collected: 08/18/24 0107    Specimen: Blood Updated: 08/18/24 0123     PTT 28.2 seconds     CBC & Differential [[id number]]  (Abnormal) Collected: 08/18/24 0107    Specimen: Blood Updated: 08/18/24 0114    Narrative:      The following orders were created for panel order CBC & Differential.  Procedure                               Abnormality         Status                     ---------                                -----------         ------                     CBC Auto Differential[[id number]]        Abnormal            Final result                 Please view results for these tests on the individual orders.    CBC Auto Differential [[id number]]  (Abnormal) Collected: 08/18/24 0107    Specimen: Blood Updated: 08/18/24 0114     WBC 10.39 10*3/mm3      RBC 4.36 10*6/mm3      Hemoglobin 14.1 g/dL      Hematocrit 39.3 %      MCV 90.1 fL      MCH 32.3 pg      MCHC 35.9 g/dL      RDW 13.1 %      RDW-SD 42.8 fl      MPV 10.0 fL      Platelets 274 10*3/mm3      Neutrophil % 77.1 %      Lymphocyte % 13.3 %      Monocyte % 7.4 %      Eosinophil % 1.4 %      Basophil % 0.4 %      Immature Grans % 0.4 %      Neutrophils, Absolute 8.01 10*3/mm3      Lymphocytes, Absolute 1.38 10*3/mm3      Monocytes, Absolute 0.77 10*3/mm3      Eosinophils, Absolute 0.15 10*3/mm3      Basophils, Absolute 0.04 10*3/mm3      Immature Grans, Absolute 0.04 10*3/mm3      nRBC 0.0 /100 WBC                 Physical Exam:     General: No distress, cooperative with exam.  AAOx3.   MSK: Presence of splint and sling limits exam.  Able to actively move the fingers.  Distal and proximal skin intact to the LUE.  TTP about the humerus region.  Distal sensation is intact to light touch.      Assessment:     Primary Problem  74-year-old female with a comminuted oblique left humeral shaft fracture.       Plan:     The patient will be placed in a gonzales splint today.  Follow up in 10-14 days with repeat xrays  NWJORGE WILLMAS      Electronically signed by Honorio Mesa PA-C on 8/19/2024 at 08:54 CDT

## 2024-08-20 ENCOUNTER — TRANSITIONAL CARE MANAGEMENT TELEPHONE ENCOUNTER (OUTPATIENT)
Dept: CALL CENTER | Facility: HOSPITAL | Age: 74
End: 2024-08-20
Payer: MEDICARE

## 2024-08-20 NOTE — OUTREACH NOTE
Call Center TCM Note      Flowsheet Row Responses   [facility] patient discharged from? [facility]   Does the patient have one of the following disease processes/diagnoses(primary or secondary)? Other   TCM attempt successful? Yes   Call start time 0115   Call end time 1321   Discharge diagnosis Humeral shaft fracture,   Meds reviewed with patient/caregiver? Yes   Is the patient having any side effects they believe may be caused by any medication additions or changes? No   Does the patient have all medications ordered at discharge? Yes   Is the patient taking all medications as directed (includes completed medication regime)? Yes   Does the patient have an appointment with their PCP within 7-14 days of discharge? No   Nursing Interventions Patient declined scheduling/rescheduling appointment at this time   Has home health visited the patient within 72 hours of discharge? N/A   Psychosocial issues? No   Did the patient receive a copy of their discharge instructions? Yes   Nursing interventions Reviewed instructions with patient   What is the patient's perception of their health status since discharge? Improving   TCM call completed? Yes   Call end time 1321            Ros Vincent RN    8/20/2024, 13:23 EDT

## 2024-08-22 RX ORDER — ONDANSETRON 4 MG/1
4 TABLET, ORALLY DISINTEGRATING ORAL EVERY 8 HOURS PRN
Qty: 30 TABLET | Refills: 0 | Status: SHIPPED | OUTPATIENT
Start: 2024-08-22

## 2024-08-29 DIAGNOSIS — N95.2 ATROPHIC VAGINITIS: ICD-10-CM

## 2024-08-29 LAB
QT INTERVAL: 398 MS
QTC INTERVAL: 470 MS

## 2024-08-31 PROBLEM — I10 ESSENTIAL HYPERTENSION: Status: RESOLVED | Noted: 2021-03-22 | Resolved: 2024-08-31

## 2024-08-31 NOTE — PROGRESS NOTES
Subjective     Chief Complaint:  Follow-up dysuria, nausea/reflux.  Hypertension follow-up.    HPI:  Patient presents to the office today for follow-up regarding dysuria, hypertension, nausea/reflux.  Following her last office visit, we did obtain a CT of the abdomen and pelvis for urology symptoms and had sent a referral to urology and gynecology for evaluation of symptoms.  She and I had also discussed reestablishing with gastroenterology for evaluation of nausea/reflux symptoms.  Please see assessment and plan below.    Patient's PMR from outside medical facility reviewed and noted.    Past Medical History:   Past Medical History:   Diagnosis Date    Arrhythmia     Family history of colonic polyps     GERD (gastroesophageal reflux disease)     History of adenomatous polyp of colon     History of colon polyps     Hyperlipidemia     Hypertension     MVP (mitral valve prolapse)     Pseudotumor cerebri     Type 2 diabetes mellitus      Past Surgical History:  Past Surgical History:   Procedure Laterality Date    CARDIAC CATHETERIZATION      CATARACT EXTRACTION Right 08/15/2021    CATARACT EXTRACTION Left 2021     SECTION      CHOLECYSTECTOMY      COLONOSCOPY  2015    One 3-4mm hyperplastic polyp in the rectum; Repeat 5 years    COLONOSCOPY  2007    Normal; Repeat 3-4 years    COLONOSCOPY  2004    One 2cm pedunculated erythematous adenomatous polyp in the sigmoid colon; One 6mm hyperplastic polyp in the rectum; Repeat 3 years    COLONOSCOPY N/A 2021    One 7mm tubular adenomatous polyp in the transverse colon; The examination was otherwise normal on direct and retroflexion views; Repeat 5 years    DILATATION AND CURETTAGE      ENDOSCOPY  2009    Normal endoscopy    ENDOSCOPY  2004    GERD    ENDOSCOPY N/A 2023    Small HH; Non-obstructing Schatzki ring-Dilated; Normal stomach; Normal examined duodenum; No specimens collected    HERNIA REPAIR       HYSTERECTOMY      LAPAROSCOPIC LYSIS OF ADHESIONS      OOPHORECTOMY      OTHER SURGICAL HISTORY  06/14/2010    EUS-Dr. Hastings-Normal EUS evaluation of the pancreas, bile duct, and ampulla of Vater-pancreatitis remains idiopathic     Social History:  reports that she has never smoked. She has never used smokeless tobacco. She reports that she does not drink alcohol and does not use drugs.    Family History: family history includes Breast cancer in her maternal grandmother; Colon polyps in her father; Heart disease in her mother; Prostate cancer in her father; Stomach cancer (age of onset: 48) in her paternal grandmother.      Allergies:  Allergies   Allergen Reactions    Lactose Intolerance (Gi) GI Intolerance     Takes Lactaid     Medications:  Prior to Admission medications    Medication Sig Start Date End Date Taking? Authorizing Provider   Barberry-Oreg Grape-Goldenseal (BERBERINE COMPLEX PO) Take 1 capsule by mouth Daily.   Yes Ann Marie Padgett MD   Coenzyme Q10 (CoQ-10) 100 MG capsule Take 1 capsule by mouth Daily.   Yes Ann Marie Padgett MD   estradiol (ESTRACE) 0.1 MG/GM vaginal cream Insert 2 g into the vagina 3 (Three) Times a Week. 7/17/24  Yes Le Silverman APRN   famotidine (Pepcid) 20 MG tablet Take 1 tablet by mouth 2 (Two) Times a Day. 7/8/24  Yes Yuni Sanchez APRN   losartan (COZAAR) 100 MG tablet Take 1 tablet by mouth once daily 10/10/23  Yes Yuni Sanchez APRN   magnesium oxide (MAG-OX) 400 MG tablet Take 1 tablet by mouth Daily.   Yes Ann Marie Padgett MD   metFORMIN ER (GLUCOPHAGE-XR) 500 MG 24 hr tablet Take 1 tablet by mouth 2 (Two) Times a Day. 6/14/24  Yes Ann Marie Padgett MD   sennosides-docusate (Senokot S) 8.6-50 MG per tablet Take 1 tablet by mouth 2 (Two) Times a Day. Take 1 tablet by mouth twice daily for 3 days, then decrease to 1 tablet once daily 8/1/24  Yes Yuni Sanchez APRN   vitamin B-12 (CYANOCOBALAMIN) 1000 MCG tablet Take 1 tablet by  "mouth Daily. [place]   Yes Ann Marie Padgett MD   amLODIPine (NORVASC) 5 MG tablet Take 1 tablet by mouth Daily. 8/19/24   Nella APRN   B Complex Vitamins (Vitamin-B Complex) tablet Take 1 tablet by mouth Daily.    Ann Marie Padgett MD   cyclobenzaprine (FLEXERIL) 5 MG tablet Take 1 tablet by mouth 3 (Three) Times a Day As Needed for Muscle Spasms. 8/19/24   Nella Mo APRN   metoprolol succinate XL (TOPROL-XL) 25 MG 24 hr tablet Take 1 tablet by mouth Daily. 8/19/24   Nella Mo APRN   ondansetron ODT (ZOFRAN-ODT) 4 MG disintegrating tablet Place 1 tablet on the tongue Every 8 (Eight) Hours As Needed for Nausea or Vomiting. 8/22/24   Yuni Sanchez APRN   vitamin C (ASCORBIC ACID) 250 MG tablet Take 1 tablet by mouth Daily.    ProviderAnn Marie MD   VITAMIN D-VITAMIN K PO Take 1 tablet by mouth Daily.    ProviderAnn Marie MD       Objective     Vital Signs: /72 (BP Location: Left arm, Patient Position: Sitting, Cuff Size: Adult)   Pulse 65   Temp 97.6 °F (36.4 °C) (Temporal)   Ht 152.4 cm (60\")   Wt 75.2 kg (165 lb 12.8 oz)   SpO2 95%   BMI 32.38 kg/m²   Physical Exam  Vitals and nursing note reviewed.   Constitutional:       General: She is not in acute distress.     Appearance: She is obese. She is not ill-appearing or toxic-appearing.   HENT:      Head: Normocephalic and atraumatic.      Mouth/Throat:      Mouth: Mucous membranes are moist.      Pharynx: Oropharynx is clear.   Cardiovascular:      Rate and Rhythm: Normal rate and regular rhythm.      Pulses: Normal pulses.      Heart sounds: Normal heart sounds.   Pulmonary:      Effort: Pulmonary effort is normal.      Breath sounds: No wheezing, rhonchi or rales.   Abdominal:      General: Bowel sounds are normal. There is distension- protuberant abdomen.      Palpations: Abdomen is soft.      Tenderness: There is no abdominal tenderness.   Musculoskeletal:         General: No swelling or tenderness. " Normal range of motion.      Cervical back: Normal range of motion and neck supple. No tenderness.   Trace bilateral ankle/pedal edema  Skin:     General: Skin is warm and dry.      Findings: No erythema or rash.   Neurological:      General: No focal deficit present.      Mental Status: She is alert and oriented to person, place, and time.   Psychiatric:         Mood and Affect: Mood normal.         Behavior: Behavior normal.         Thought Content: Thought content normal.         Judgment: Judgment normal.      Results Reviewed:  Office Visit with Le Silverman APRN (07/16/2024)   [place] sleep scale score of 16.  Assessment / Plan     Assessment/Plan:  Diagnoses and all orders for this visit:    1. Daytime somnolence (Primary)  -     Home Sleep Study; Future    2. Constipation, unspecified constipation type  -     sennosides-docusate (Senokot S) 8.6-50 MG per tablet; Take 1 tablet by mouth 2 (Two) Times a Day. Take 1 tablet by mouth twice daily for 3 days, then decrease to 1 tablet once daily  Dispense: 60 tablet; Refill: 2    3. PND (paroxysmal nocturnal dyspnea)  -     Home Sleep Study; Future    4. Sleep apnea, unspecified type  -     Home Sleep Study; Future    5. Essential hypertension       Patient presents to the office today for a follow-up.  Following the office visit on 6/6 for subsequent Medicare wellness exam, she had indicated potential adverse reaction to propranolol.  She was placed on this in place of metoprolol as she had been having more frequent headaches.  Propranolol initially seemed to work well however she subsequently noted worsening swelling in her legs and feet.  We discontinued propranolol and placed her back on metoprolol.  She indicated leg swelling improved quickly following discontinuation of propranolol.  At her last office visit on 7/8, her blood pressure was 162/90 so we did increase her amlodipine from 2.5 mg twice daily to 5 mg twice daily.    Blood pressure in the  office today is 144/72.  She does not take this on a consistent basis but does feel as though her readings are better at home and she does get nervous coming into the office and feels that that likely contributes.  She does still have some leg swelling noted, but does not necessarily feel that this is as severe as it was when she was taking the propranolol.  She is not wearing full-length or mid to high-grade strength compression stockings, so I did recommend trying knee-high compression stockings at a higher strength.  Patient denies any chest pain or shortness of breath out of the ordinary.  Continue present antihypertensive regimen with amlodipine 5 mg twice daily, losartan 100 mg daily, Toprol-XL 25 mg daily.  She does indicate some improvement in her headaches overall as well but does have an upcoming appointment with neurology in September for further evaluation. We did perform a MRI earlier this year which did show mild white matter foci of FLAIR signal abnormality which can be seen with chronic migraines or chronic small vessel ischemic changes. She had previously been following with Dr. Morse at [facility] for a choroid plexus cyst which was not mentioned on the most recent MRI.    Patient had recently indicated more consistent problems with burning with urination.  This had been intermittently occurring for years prior to this.  She indicated urethral and vaginal burning can occur even without urination.  She had noted some vaginal dryness and we had discussed application of coconut oil daily which had helped with burning sensation. he was relating feelings of incomplete bladder emptying saying that her bladder always felt full, as well as feelings of urgency/frequency and incontinence. With the symptoms, did have some concern for possible interstitial cystitis so I did refer her to urology. I did order a CT of the abdomen and pelvis following her last office visit which showed the floor of the urinary  bladder to be below the level of the symphysis pubis which may suggest cystocele, which could explain feelings of fullness and incomplete bladder emptying. I have referred her to gynecology to discuss options for cystocele.  She was evaluated on 7/16 by urology it was felt her symptoms were more likely consistent with vaginal atrophy as well as overactive bladder and mixed incontinence.  She was started on vaginal estrogen cream and a 6-week sample of Gemtesa was provided as well.  She will be following up with urology in 6 months as there was a 1.2 cm low-density lesion of the right kidney incidentally noted on imaging.  She does indicate burning sensation is better but she still has pressure and fullness in her pelvis/bladder still feel that it would be worthwhile for the patient to see gynecology if her symptoms do not improve with above treatment.    Patient has complaints of nausea frequently and reflux.  CT did show moderate thickening of the wall of the sigmoid colon and distal descending colon which is probably due to incomplete distention.  Possibility of acute nonspecific colitis could not be excluded.  This did show moderate gas and stool seen throughout the colon with no findings to suggest obstruction.  The patient and I discussed that liquid stool can sometimes leak around solid stool in the colon, so even though she is having liquid bowel movements she still has solid stool noted in the colon.  Following her last office visit we did trial stool softener with Senokot and discussed increasing water intake.  She was also placed on Pepcid.  She does feel as though nausea and reflux are better, however her constipation has not improved.  Discussed with patient to obtain magnesium citrate over-the-counter.  We discussed taking half a bottle and if she has not had a bowel movement within 4 hours take the other half of the bottle.  Also recommend increasing Senokot to twice daily.  Patient will be  reestablishing with gastroenterology on 8/6 to discuss concerns as she had previously been following with them for nonalcoholic fatty liver disease and was overdue for a follow-up.    Patient does note concerns for overall feeling unwell and generalized fatigue.  She has noted that she gasps for air when drifting off to sleep and would like to nap throughout the day if given the opportunity.  She does not feel well rested upon waking.  Her [place] sleep scale score is 16 so I have ordered a sleep study for further issues.    Return in about 7 weeks (around 9/18/2024) for Recheck. unless patient needs to be seen sooner or acute issues arise.    I have discussed the patient results/orders and and plan/recommendation with them at today's visit.      Yuni Sanchez, ARNALDO   08/01/2024

## 2024-09-03 ENCOUNTER — TELEPHONE (OUTPATIENT)
Dept: GASTROENTEROLOGY | Facility: CLINIC | Age: 74
End: 2024-09-03

## 2024-09-03 NOTE — TELEPHONE ENCOUNTER
Caller: Pauline Molina    Relationship to patient: Self    Best call back number: [phone]    Chief complaint:     Type of visit: CLS AND EGD    Requested date: PT WILL CALL WHEN READY TO RESCHEDULE     If rescheduling, when is the original appointment: 9/27/24     Additional notes: PT BROKE LEFT ARM WILL BE NEED TO WAIT A COUPLE MONTHS TO RESCHEDULE. AFTER XRAY TOMORROW SHOULD HAVE MORE DETAILS.

## 2024-09-12 ENCOUNTER — APPOINTMENT (OUTPATIENT)
Dept: CT IMAGING | Facility: HOSPITAL | Age: 74
End: 2024-09-12
Payer: MEDICARE

## 2024-09-12 ENCOUNTER — HOSPITAL ENCOUNTER (EMERGENCY)
Facility: HOSPITAL | Age: 74
Discharge: HOME | End: 2024-09-12
Attending: EMERGENCY MEDICINE
Payer: MEDICARE

## 2024-09-12 VITALS
RESPIRATION RATE: 16 BRPM | HEIGHT: 59 IN | DIASTOLIC BLOOD PRESSURE: 70 MMHG | BODY MASS INDEX: 32.16 KG/M2 | OXYGEN SATURATION: 95 % | TEMPERATURE: 98.2 F | SYSTOLIC BLOOD PRESSURE: 160 MMHG | HEART RATE: 77 BPM | WEIGHT: 159.5 LBS

## 2024-09-12 DIAGNOSIS — K27.9 PUD (PEPTIC ULCER DISEASE): ICD-10-CM

## 2024-09-12 DIAGNOSIS — R10.12 LUQ ABDOMINAL PAIN: Primary | ICD-10-CM

## 2024-09-12 DIAGNOSIS — K29.00 ACUTE GASTRITIS WITHOUT HEMORRHAGE, UNSPECIFIED GASTRITIS TYPE: ICD-10-CM

## 2024-09-12 DIAGNOSIS — N28.1 RENAL CYST, RIGHT: ICD-10-CM

## 2024-09-12 LAB
ALBUMIN SERPL-MCNC: 4.6 G/DL (ref 3.5–5.2)
ALBUMIN/GLOB SERPL: 1.6 G/DL
ALP SERPL-CCNC: 180 U/L (ref 39–117)
ALT SERPL W P-5'-P-CCNC: 22 U/L (ref 1–33)
ANION GAP SERPL CALCULATED.3IONS-SCNC: 12 MMOL/L (ref 5–15)
AST SERPL-CCNC: 29 U/L (ref 1–32)
BASOPHILS # BLD AUTO: 0.03 10*3/MM3 (ref 0–0.2)
BASOPHILS NFR BLD AUTO: 0.5 % (ref 0–1.5)
BILIRUB SERPL-MCNC: 0.7 MG/DL (ref 0–1.2)
BILIRUB UR QL STRIP: NEGATIVE
BUN SERPL-MCNC: 22 MG/DL (ref 8–23)
BUN/CREAT SERPL: 26.8 (ref 7–25)
CALCIUM SPEC-SCNC: 9.5 MG/DL (ref 8.6–10.5)
CHLORIDE SERPL-SCNC: 104 MMOL/L (ref 98–107)
CLARITY UR: CLEAR
CO2 SERPL-SCNC: 25 MMOL/L (ref 22–29)
COLOR UR: YELLOW
CREAT SERPL-MCNC: 0.82 MG/DL (ref 0.57–1)
DEPRECATED RDW RBC AUTO: 44.8 FL (ref 37–54)
EGFRCR SERPLBLD CKD-EPI 2021: 76.1 ML/MIN/1.73
EOSINOPHIL # BLD AUTO: 0.22 10*3/MM3 (ref 0–0.4)
EOSINOPHIL NFR BLD AUTO: 3.5 % (ref 0.3–6.2)
ERYTHROCYTE [DISTWIDTH] IN BLOOD BY AUTOMATED COUNT: 13.4 % (ref 12.3–15.4)
GLOBULIN UR ELPH-MCNC: 2.9 GM/DL
GLUCOSE SERPL-MCNC: 104 MG/DL (ref 65–99)
GLUCOSE UR STRIP-MCNC: NEGATIVE MG/DL
HCT VFR BLD AUTO: 40.4 % (ref 34–46.6)
HGB BLD-MCNC: 13.5 G/DL (ref 12–15.9)
HGB UR QL STRIP.AUTO: NEGATIVE
HOLD SPECIMEN: NORMAL
IMM GRANULOCYTES # BLD AUTO: 0.01 10*3/MM3 (ref 0–0.05)
IMM GRANULOCYTES NFR BLD AUTO: 0.2 % (ref 0–0.5)
KETONES UR QL STRIP: NEGATIVE
LEUKOCYTE ESTERASE UR QL STRIP.AUTO: NEGATIVE
LIPASE SERPL-CCNC: 61 U/L (ref 13–60)
LYMPHOCYTES # BLD AUTO: 1.53 10*3/MM3 (ref 0.7–3.1)
LYMPHOCYTES NFR BLD AUTO: 24.6 % (ref 19.6–45.3)
MCH RBC QN AUTO: 30.3 PG (ref 26.6–33)
MCHC RBC AUTO-ENTMCNC: 33.4 G/DL (ref 31.5–35.7)
MCV RBC AUTO: 90.6 FL (ref 79–97)
MONOCYTES # BLD AUTO: 0.56 10*3/MM3 (ref 0.1–0.9)
MONOCYTES NFR BLD AUTO: 9 % (ref 5–12)
NEUTROPHILS NFR BLD AUTO: 3.87 10*3/MM3 (ref 1.7–7)
NEUTROPHILS NFR BLD AUTO: 62.2 % (ref 42.7–76)
NITRITE UR QL STRIP: NEGATIVE
NRBC BLD AUTO-RTO: 0 /100 WBC (ref 0–0.2)
PH UR STRIP.AUTO: 6 [PH] (ref 5–8)
PLATELET # BLD AUTO: 286 10*3/MM3 (ref 140–450)
PMV BLD AUTO: 10 FL (ref 6–12)
POTASSIUM SERPL-SCNC: 4.1 MMOL/L (ref 3.5–5.2)
PROT SERPL-MCNC: 7.5 G/DL (ref 6–8.5)
PROT UR QL STRIP: NEGATIVE
RBC # BLD AUTO: 4.46 10*6/MM3 (ref 3.77–5.28)
SODIUM SERPL-SCNC: 141 MMOL/L (ref 136–145)
SP GR UR STRIP: <=1.005 (ref 1–1.03)
UROBILINOGEN UR QL STRIP: NORMAL
WBC NRBC COR # BLD AUTO: 6.22 10*3/MM3 (ref 3.4–10.8)
WHOLE BLOOD HOLD COAG: NORMAL
WHOLE BLOOD HOLD SPECIMEN: NORMAL

## 2024-09-12 PROCEDURE — 99285 EMERGENCY DEPT VISIT HI MDM: CPT

## 2024-09-12 PROCEDURE — 74177 CT ABD & PELVIS W/CONTRAST: CPT

## 2024-09-12 PROCEDURE — 80053 COMPREHEN METABOLIC PANEL: CPT | Performed by: EMERGENCY MEDICINE

## 2024-09-12 PROCEDURE — 25810000003 SODIUM CHLORIDE 0.9 % SOLUTION: Performed by: EMERGENCY MEDICINE

## 2024-09-12 PROCEDURE — 93005 ELECTROCARDIOGRAM TRACING: CPT | Performed by: EMERGENCY MEDICINE

## 2024-09-12 PROCEDURE — 93010 ELECTROCARDIOGRAM REPORT: CPT | Performed by: INTERNAL MEDICINE

## 2024-09-12 PROCEDURE — 25510000001 IOPAMIDOL 61 % SOLUTION: Performed by: EMERGENCY MEDICINE

## 2024-09-12 PROCEDURE — 81003 URINALYSIS AUTO W/O SCOPE: CPT | Performed by: EMERGENCY MEDICINE

## 2024-09-12 PROCEDURE — 96374 THER/PROPH/DIAG INJ IV PUSH: CPT

## 2024-09-12 PROCEDURE — 25010000002 ONDANSETRON PER 1 MG: Performed by: EMERGENCY MEDICINE

## 2024-09-12 PROCEDURE — 83690 ASSAY OF LIPASE: CPT | Performed by: EMERGENCY MEDICINE

## 2024-09-12 PROCEDURE — 85025 COMPLETE CBC W/AUTO DIFF WBC: CPT | Performed by: EMERGENCY MEDICINE

## 2024-09-12 RX ORDER — DICYCLOMINE HYDROCHLORIDE 10 MG/1
10 CAPSULE ORAL
Qty: 30 CAPSULE | Refills: 0 | Status: SHIPPED | OUTPATIENT
Start: 2024-09-12 | End: 2024-09-19

## 2024-09-12 RX ORDER — SODIUM CHLORIDE 0.9 % (FLUSH) 0.9 %
10 SYRINGE (ML) INJECTION AS NEEDED
Status: DISCONTINUED | OUTPATIENT
Start: 2024-09-12 | End: 2024-09-12 | Stop reason: HOSPADM

## 2024-09-12 RX ORDER — ESOMEPRAZOLE MAGNESIUM 40 MG/1
40 CAPSULE, DELAYED RELEASE ORAL
Qty: 30 CAPSULE | Refills: 0 | Status: SHIPPED | OUTPATIENT
Start: 2024-09-12

## 2024-09-12 RX ORDER — ALUMINA, MAGNESIA, AND SIMETHICONE 2400; 2400; 240 MG/30ML; MG/30ML; MG/30ML
15 SUSPENSION ORAL ONCE
Status: COMPLETED | OUTPATIENT
Start: 2024-09-12 | End: 2024-09-12

## 2024-09-12 RX ORDER — ESOMEPRAZOLE MAGNESIUM 40 MG/1
40 CAPSULE, DELAYED RELEASE ORAL
Qty: 30 CAPSULE | Refills: 0 | Status: SHIPPED | OUTPATIENT
Start: 2024-09-12 | End: 2024-09-12

## 2024-09-12 RX ORDER — SUCRALFATE 1 G/1
1 TABLET ORAL ONCE
Status: COMPLETED | OUTPATIENT
Start: 2024-09-12 | End: 2024-09-12

## 2024-09-12 RX ORDER — ONDANSETRON 4 MG/1
4 TABLET, ORALLY DISINTEGRATING ORAL EVERY 8 HOURS PRN
Qty: 12 TABLET | Refills: 0 | Status: SHIPPED | OUTPATIENT
Start: 2024-09-12 | End: 2024-09-12

## 2024-09-12 RX ORDER — ONDANSETRON 2 MG/ML
4 INJECTION INTRAMUSCULAR; INTRAVENOUS ONCE
Status: COMPLETED | OUTPATIENT
Start: 2024-09-12 | End: 2024-09-12

## 2024-09-12 RX ORDER — IOPAMIDOL 612 MG/ML
100 INJECTION, SOLUTION INTRAVASCULAR
Status: COMPLETED | OUTPATIENT
Start: 2024-09-12 | End: 2024-09-12

## 2024-09-12 RX ORDER — DICYCLOMINE HYDROCHLORIDE 10 MG/1
10 CAPSULE ORAL
Qty: 30 CAPSULE | Refills: 0 | Status: SHIPPED | OUTPATIENT
Start: 2024-09-12 | End: 2024-09-12

## 2024-09-12 RX ORDER — ONDANSETRON 4 MG/1
4 TABLET, ORALLY DISINTEGRATING ORAL EVERY 8 HOURS PRN
Qty: 12 TABLET | Refills: 0 | Status: SHIPPED | OUTPATIENT
Start: 2024-09-12

## 2024-09-12 RX ORDER — SUCRALFATE ORAL 1 G/10ML
1 SUSPENSION ORAL
Qty: 414 ML | Refills: 0 | Status: SHIPPED | OUTPATIENT
Start: 2024-09-12 | End: 2024-09-12

## 2024-09-12 RX ORDER — SUCRALFATE ORAL 1 G/10ML
1 SUSPENSION ORAL
Qty: 414 ML | Refills: 0 | Status: SHIPPED | OUTPATIENT
Start: 2024-09-12

## 2024-09-12 RX ADMIN — SUCRALFATE 1 G: 1 TABLET ORAL at 15:57

## 2024-09-12 RX ADMIN — ALUMINUM HYDROXIDE, MAGNESIUM HYDROXIDE, DIMETHICONE 15 ML: 400; 400; 40 SUSPENSION ORAL at 15:57

## 2024-09-12 RX ADMIN — IOPAMIDOL 100 ML: 612 INJECTION, SOLUTION INTRAVENOUS at 15:41

## 2024-09-12 RX ADMIN — SODIUM CHLORIDE 500 ML: 9 INJECTION, SOLUTION INTRAVENOUS at 15:55

## 2024-09-12 RX ADMIN — ONDANSETRON 4 MG: 2 INJECTION INTRAMUSCULAR; INTRAVENOUS at 15:56

## 2024-09-12 NOTE — ED PROVIDER NOTES
Subjective   History of Present Illness  Patient is a 74-year-old female with a history of hypertension and diabetes who presents to the ER with abdominal pain.  Patient states she has had achy left upper quadrant abdominal pain for the last 5 days.  Patient states the pain is worse anytime she eats.  Patient does have a history of pancreatitis in the past.  She does have associated nausea.  She recently had a shoulder surgery and has been taking some pain medication and NSAIDs.  She denies any fever, chest pain, shortness of air, vomiting, diarrhea, urinary changes, neurologic changes.      Review of Systems   Constitutional: Negative.    HENT: Negative.     Eyes: Negative.    Respiratory: Negative.     Cardiovascular: Negative.    Gastrointestinal:  Positive for abdominal pain and nausea.   Endocrine: Negative.    Genitourinary: Negative.    Musculoskeletal: Negative.    Skin: Negative.    Allergic/Immunologic: Negative.    Neurological: Negative.    Hematological: Negative.    Psychiatric/Behavioral: Negative.     All other systems reviewed and are negative.      Past Medical History:   Diagnosis Date    Arrhythmia     Family history of colonic polyps     GERD (gastroesophageal reflux disease)     History of adenomatous polyp of colon     History of colon polyps     Hyperlipidemia     Hypertension     MVP (mitral valve prolapse)     Pseudotumor cerebri     Type 2 diabetes mellitus        Allergies   Allergen Reactions    Lactose Intolerance (Gi) GI Intolerance     Takes Lactaid       Past Surgical History:   Procedure Laterality Date    CARDIAC CATHETERIZATION      CATARACT EXTRACTION Right 08/15/2021    CATARACT EXTRACTION Left 2021     SECTION      CHOLECYSTECTOMY      COLONOSCOPY  2015    One 3-4mm hyperplastic polyp in the rectum; Repeat 5 years    COLONOSCOPY  2007    Normal; Repeat 3-4 years    COLONOSCOPY  2004    One 2cm pedunculated erythematous adenomatous polyp in the  sigmoid colon; One 6mm hyperplastic polyp in the rectum; Repeat 3 years    COLONOSCOPY N/A 06/18/2021    One 7mm tubular adenomatous polyp in the transverse colon; The examination was otherwise normal on direct and retroflexion views; Repeat 5 years    DILATATION AND CURETTAGE      ENDOSCOPY  09/30/2009    Normal endoscopy    ENDOSCOPY  08/30/2004    GERD    ENDOSCOPY N/A 02/13/2023    Small HH; Non-obstructing Schatzki ring-Dilated; Normal stomach; Normal examined duodenum; No specimens collected    HERNIA REPAIR      HYSTERECTOMY      LAPAROSCOPIC LYSIS OF ADHESIONS      OOPHORECTOMY      OTHER SURGICAL HISTORY  06/14/2010    EUS-Dr. Hastings-Normal EUS evaluation of the pancreas, bile duct, and ampulla of Vater-pancreatitis remains idiopathic       Family History   Problem Relation Age of Onset    Heart disease Mother     Prostate cancer Father     Colon polyps Father         > 60 years of age    Breast cancer Maternal Grandmother     Stomach cancer Paternal Grandmother 48    Colon cancer Neg Hx     Esophageal cancer Neg Hx     Liver cancer Neg Hx     Liver disease Neg Hx     Rectal cancer Neg Hx        Social History     Socioeconomic History    Marital status:    Tobacco Use    Smoking status: Never    Smokeless tobacco: Never   Vaping Use    Vaping status: Never Used   Substance and Sexual Activity    Alcohol use: Never    Drug use: Never    Sexual activity: Defer           Objective   Physical Exam  Vitals and nursing note reviewed.   Constitutional:       Appearance: She is well-developed.   HENT:      Head: Normocephalic and atraumatic.   Eyes:      Conjunctiva/sclera: Conjunctivae normal.      Pupils: Pupils are equal, round, and reactive to light.   Cardiovascular:      Rate and Rhythm: Normal rate and regular rhythm.      Heart sounds: Normal heart sounds.   Pulmonary:      Effort: Pulmonary effort is normal.      Breath sounds: Normal breath sounds.   Abdominal:      Palpations: Abdomen is soft.       Tenderness: There is abdominal tenderness in the left upper quadrant. There is no right CVA tenderness, left CVA tenderness, guarding or rebound.   Musculoskeletal:         General: No deformity. Normal range of motion.      Cervical back: Normal range of motion.   Skin:     General: Skin is warm.   Neurological:      Mental Status: She is alert and oriented to person, place, and time.   Psychiatric:         Behavior: Behavior normal.         Procedures           ED Course      EKG as interpreted by me: Normal sinus rhythm with a rate of 78, no acute ischemia or infarction                             Lab Results (last 24 hours)       Procedure Component Value Units Date/Time    CBC & Differential [[id number]]  (Normal) Collected: 09/12/24 1429    Specimen: Blood Updated: 09/12/24 1506    Narrative:      The following orders were created for panel order CBC & Differential.  Procedure                               Abnormality         Status                     ---------                               -----------         ------                     CBC Auto Differential[[id number]]        Normal              Final result                 Please view results for these tests on the individual orders.    Comprehensive Metabolic Panel [[id number]]  (Abnormal) Collected: 09/12/24 1429    Specimen: Blood Updated: 09/12/24 1523     Glucose 104 mg/dL      BUN 22 mg/dL      Creatinine 0.82 mg/dL      Sodium 141 mmol/L      Potassium 4.1 mmol/L      Chloride 104 mmol/L      CO2 25.0 mmol/L      Calcium 9.5 mg/dL      Total Protein 7.5 g/dL      Albumin 4.6 g/dL      ALT (SGPT) 22 U/L      AST (SGOT) 29 U/L      Alkaline Phosphatase 180 U/L      Total Bilirubin 0.7 mg/dL      Globulin 2.9 gm/dL      A/G Ratio 1.6 g/dL      BUN/Creatinine Ratio 26.8     Anion Gap 12.0 mmol/L      eGFR 76.1 mL/min/1.73     Narrative:      GFR Normal >60  Chronic Kidney Disease <60  Kidney Failure <15    The GFR formula is only valid for adults with  stable renal function between ages 18 and 70.    Lipase [[id number]]  (Abnormal) Collected: 09/12/24 1429    Specimen: Blood Updated: 09/12/24 1518     Lipase 61 U/L     CBC Auto Differential [[id number]]  (Normal) Collected: 09/12/24 1429    Specimen: Blood Updated: 09/12/24 1506     WBC 6.22 10*3/mm3      RBC 4.46 10*6/mm3      Hemoglobin 13.5 g/dL      Hematocrit 40.4 %      MCV 90.6 fL      MCH 30.3 pg      MCHC 33.4 g/dL      RDW 13.4 %      RDW-SD 44.8 fl      MPV 10.0 fL      Platelets 286 10*3/mm3      Neutrophil % 62.2 %      Lymphocyte % 24.6 %      Monocyte % 9.0 %      Eosinophil % 3.5 %      Basophil % 0.5 %      Immature Grans % 0.2 %      Neutrophils, Absolute 3.87 10*3/mm3      Lymphocytes, Absolute 1.53 10*3/mm3      Monocytes, Absolute 0.56 10*3/mm3      Eosinophils, Absolute 0.22 10*3/mm3      Basophils, Absolute 0.03 10*3/mm3      Immature Grans, Absolute 0.01 10*3/mm3      nRBC 0.0 /100 WBC     Urinalysis With Culture If Indicated - Urine, Clean Catch [[id number]]  (Normal) Collected: 09/12/24 1443    Specimen: Urine, Clean Catch Updated: 09/12/24 1503     Color, UA Yellow     Appearance, UA Clear     pH, UA 6.0     Specific Gravity, UA <=1.005     Glucose, UA Negative     Ketones, UA Negative     Bilirubin, UA Negative     Blood, UA Negative     Protein, UA Negative     Leuk Esterase, UA Negative     Nitrite, UA Negative     Urobilinogen, UA 0.2 E.U./dL    Narrative:      In absence of clinical symptoms, the presence of pyuria, bacteria, and/or nitrites on the urinalysis result does not correlate with infection.  Urine microscopic not indicated.           CT Abdomen Pelvis With Contrast   Final Result   1. A 1.1 cm right breast nodule appears stable mammographically dating   back to 9/3/2019.   2. Prior cholecystectomy and hysterectomy. Prior mesh anterior abdominal   wall hernia repair.   3. A 1.3 cm mildly hyperdense exophytic renal lesion on the right is   likely a complex cyst. A recent  multiphase CT did not demonstrate any   significant contrast enhancement of this lesion.   4. Mild diverticulosis of the colon. Under distention of stomach and   colon. No small bowel distention.   5. Other nonacute findings, as discussed.       This report was signed and finalized on 9/12/2024 4:17 PM by Dr. Harpreet Grigsby MD.                       Medical Decision Making  Patient is a 74-year-old female with a history of hypertension and diabetes who presents to the ER with abdominal pain.  Patient states she has had achy left upper quadrant abdominal pain for the last 5 days.  Patient states the pain is worse anytime she eats.  Patient does have a history of pancreatitis in the past.  She does have associated nausea.  She recently had a shoulder surgery and has been taking some pain medication and NSAIDs.  She denies any fever, chest pain, shortness of air, vomiting, diarrhea, urinary changes, neurologic changes.    Differential diagnosis: Pancreatitis, gastritis, peptic ulcer disease    Patient was given IV fluids, GI cocktail, Carafate and Zofran.  Patient was feeling better after treatment.  Labs are unremarkable.  CT scan of the abdomen pelvis showed a 1.1 cm right breast nodule that was stable from prior studies.  Patient has a 1.3 cm mildly hyperdense exophytic renal lesion on the right that is consistent with a complex cyst.  Patient states this is a known cyst and is being followed.  Scans were otherwise unremarkable.  Workup was negative.  I suspect peptic ulcer disease versus gastritis.  Patient will be discharged home with Nexium, Carafate, Bentyl and Zofran.  The patient most likely needs an endoscopy if symptoms persist.  She is to return to the ER for any worse or new pain, fever, vomiting or other concerns.  Patient was agreeable to the plan and discharged home.      Problems Addressed:  Acute gastritis without hemorrhage, unspecified gastritis type: complicated acute illness or injury  LUQ  abdominal pain: complicated acute illness or injury  PUD (peptic ulcer disease): complicated acute illness or injury  Renal cyst, right: complicated acute illness or injury    Amount and/or Complexity of Data Reviewed  Labs: ordered. Decision-making details documented in ED Course.  Radiology: ordered. Decision-making details documented in ED Course.  ECG/medicine tests: ordered. Decision-making details documented in ED Course.    Risk  OTC drugs.  Prescription drug management.        Final diagnoses:   LUQ abdominal pain   Acute gastritis without hemorrhage, unspecified gastritis type   PUD (peptic ulcer disease)   Renal cyst, right       ED Disposition  ED Disposition       ED Disposition   Discharge    Condition   Stable    Comment   --               Eric Espinoza MD  [address] [place] [address]  [phone]    Schedule an appointment as soon as possible for a visit            Medication List        New Prescriptions      dicyclomine 10 MG capsule  Commonly known as: BENTYL  Take 1 capsule by mouth 4 (Four) Times a Day Before Meals & at Bedtime.     esomeprazole 40 MG capsule  Commonly known as: nexIUM  Take 1 capsule by mouth Every Morning Before Breakfast.     sucralfate 1 GM/10ML suspension  Commonly known as: CARAFATE  Take 10 mL by mouth 4 (Four) Times a Day With Meals & at Bedtime.            Changed      * ondansetron ODT 4 MG disintegrating tablet  Commonly known as: ZOFRAN-ODT  Place 1 tablet on the tongue Every 8 (Eight) Hours As Needed for Nausea or Vomiting.  What changed: Another medication with the same name was added. Make sure you understand how and when to take each.     * ondansetron ODT 4 MG disintegrating tablet  Commonly known as: ZOFRAN-ODT  Take 1 tablet by mouth Every 8 (Eight) Hours As Needed for Nausea or Vomiting.  What changed: You were already taking a medication with the same name, and this prescription was added. Make sure you understand how and when to take  each.           * This list has 2 medication(s) that are the same as other medications prescribed for you. Read the directions carefully, and ask your doctor or other care provider to review them with you.                   Where to Get Your Medications        These medications were sent to [facility] - [place], [place] - [address] [phone]  - [phone] [address] [address]      Phone: [phone]   dicyclomine 10 MG capsule  esomeprazole 40 MG capsule  ondansetron ODT 4 MG disintegrating tablet  sucralfate 1 GM/10ML suspension            Betina Pereira MD  09/12/24 0875

## 2024-09-13 LAB
QT INTERVAL: 390 MS
QTC INTERVAL: 444 MS

## 2024-09-19 ENCOUNTER — OFFICE VISIT (OUTPATIENT)
Dept: INTERNAL MEDICINE | Facility: CLINIC | Age: 74
End: 2024-09-19
Payer: MEDICARE

## 2024-09-19 VITALS
BODY MASS INDEX: 32.25 KG/M2 | HEIGHT: 59 IN | SYSTOLIC BLOOD PRESSURE: 138 MMHG | WEIGHT: 160 LBS | OXYGEN SATURATION: 96 % | DIASTOLIC BLOOD PRESSURE: 72 MMHG | TEMPERATURE: 97.8 F | HEART RATE: 71 BPM

## 2024-09-19 DIAGNOSIS — S42.302S CLOSED FRACTURE OF SHAFT OF LEFT HUMERUS, UNSPECIFIED FRACTURE MORPHOLOGY, SEQUELA: ICD-10-CM

## 2024-09-19 DIAGNOSIS — E11.65 TYPE 2 DIABETES MELLITUS WITH HYPERGLYCEMIA, WITHOUT LONG-TERM CURRENT USE OF INSULIN: Primary | ICD-10-CM

## 2024-09-19 DIAGNOSIS — I10 ESSENTIAL HYPERTENSION: Chronic | ICD-10-CM

## 2024-09-19 DIAGNOSIS — K21.9 HIATAL HERNIA WITH GERD: ICD-10-CM

## 2024-09-19 DIAGNOSIS — H69.93 ETD (EUSTACHIAN TUBE DYSFUNCTION), BILATERAL: ICD-10-CM

## 2024-09-19 DIAGNOSIS — R42 VERTIGO: ICD-10-CM

## 2024-09-19 DIAGNOSIS — K44.9 HIATAL HERNIA WITH GERD: ICD-10-CM

## 2024-09-19 LAB
EXPIRATION DATE: NORMAL
HBA1C MFR BLD: 5.6 % (ref 4.5–5.7)
Lab: NORMAL

## 2024-09-19 PROCEDURE — 3044F HG A1C LEVEL LT 7.0%: CPT | Performed by: NURSE PRACTITIONER

## 2024-09-19 PROCEDURE — 99214 OFFICE O/P EST MOD 30 MIN: CPT | Performed by: NURSE PRACTITIONER

## 2024-09-19 PROCEDURE — 3075F SYST BP GE 130 - 139MM HG: CPT | Performed by: NURSE PRACTITIONER

## 2024-09-19 PROCEDURE — 1159F MED LIST DOCD IN RCRD: CPT | Performed by: NURSE PRACTITIONER

## 2024-09-19 PROCEDURE — 1126F AMNT PAIN NOTED NONE PRSNT: CPT | Performed by: NURSE PRACTITIONER

## 2024-09-19 PROCEDURE — 83036 HEMOGLOBIN GLYCOSYLATED A1C: CPT | Performed by: NURSE PRACTITIONER

## 2024-09-19 PROCEDURE — 3078F DIAST BP <80 MM HG: CPT | Performed by: NURSE PRACTITIONER

## 2024-09-19 PROCEDURE — 1160F RVW MEDS BY RX/DR IN RCRD: CPT | Performed by: NURSE PRACTITIONER

## 2024-09-19 RX ORDER — METFORMIN HCL 500 MG
500 TABLET, EXTENDED RELEASE 24 HR ORAL
Start: 2024-09-19

## 2024-09-19 RX ORDER — AMLODIPINE BESYLATE 5 MG/1
5 TABLET ORAL
Qty: 90 TABLET | Refills: 3 | Status: SHIPPED | OUTPATIENT
Start: 2024-09-19

## 2024-09-27 PROBLEM — H69.93 ETD (EUSTACHIAN TUBE DYSFUNCTION), BILATERAL: Status: ACTIVE | Noted: 2024-09-27

## 2024-10-17 DIAGNOSIS — M89.8X2 PAIN OF LEFT HUMERUS: Primary | ICD-10-CM

## 2024-10-18 ENCOUNTER — OFFICE VISIT (OUTPATIENT)
Age: 74
End: 2024-10-18

## 2024-10-18 VITALS — BODY MASS INDEX: 32.66 KG/M2 | WEIGHT: 162 LBS | HEIGHT: 59 IN

## 2024-10-18 DIAGNOSIS — M89.8X2 PAIN OF LEFT HUMERUS: Primary | ICD-10-CM

## 2024-10-18 DIAGNOSIS — S42.332D CLOSED DISPLACED OBLIQUE FRACTURE OF SHAFT OF LEFT HUMERUS WITH ROUTINE HEALING, SUBSEQUENT ENCOUNTER: ICD-10-CM

## 2024-10-18 RX ORDER — MAGNESIUM OXIDE 400 MG/1
400 TABLET ORAL DAILY
COMMUNITY

## 2024-10-18 RX ORDER — ESOMEPRAZOLE MAGNESIUM 40 MG/1
40 CAPSULE, DELAYED RELEASE ORAL
COMMUNITY
Start: 2024-09-12

## 2024-10-18 RX ORDER — DICYCLOMINE HYDROCHLORIDE 10 MG/1
CAPSULE ORAL
COMMUNITY
Start: 2024-09-12

## 2024-10-18 RX ORDER — LANOLIN ALCOHOL/MO/W.PET/CERES
1000 CREAM (GRAM) TOPICAL DAILY
COMMUNITY

## 2024-10-18 RX ORDER — AMLODIPINE BESYLATE 2.5 MG/1
2.5 TABLET ORAL 2 TIMES DAILY
COMMUNITY
Start: 2024-09-27

## 2024-10-18 RX ORDER — VITAMIN B COMPLEX
1 TABLET ORAL DAILY
COMMUNITY

## 2024-10-18 RX ORDER — ESTRADIOL 0.1 MG/G
2 CREAM VAGINAL
COMMUNITY
Start: 2024-07-17

## 2024-10-18 RX ORDER — METOPROLOL SUCCINATE 25 MG/1
TABLET, EXTENDED RELEASE ORAL
COMMUNITY
Start: 2024-08-19

## 2024-10-18 RX ORDER — FAMOTIDINE 20 MG/1
20 TABLET, FILM COATED ORAL 2 TIMES DAILY
COMMUNITY
Start: 2024-07-08

## 2024-10-18 RX ORDER — METFORMIN HCL 500 MG
500 TABLET, EXTENDED RELEASE 24 HR ORAL 2 TIMES DAILY WITH MEALS
COMMUNITY
Start: 2024-09-09

## 2024-10-18 NOTE — PROGRESS NOTES
Orthopaedic Clinic Note - Established Patient    NAME:  Lizz Vasquez   : 1950  MRN: [id number]      10/18/2024      CHIEF COMPLAINT: had concerns including Fracture (Left humerus).      HISTORY OF PRESENT ILLNESS:    74-year-old female presents today for follow-up for a left humeral shaft fracture she is doing okay without any significant complaints.      Past Medical History:        Diagnosis Date    Anxiety     Headache(784.0)     Hypertension     Type II or unspecified type diabetes mellitus without mention of complication, not stated as uncontrolled        Past Surgical History:        Procedure Laterality Date    ABDOMEN SURGERY      to remove growth off of fallopian tube.     APPENDECTOMY      Pt also had a mass on tubes she had removed with this surgery     SECTION      CHOLECYSTECTOMY      DILATION AND CURETTAGE OF UTERUS  ?    w/Ablation?    HERNIA REPAIR      HYSTERECTOMY (CERVIX STATUS UNKNOWN)      total       Current Medications:   Prior to Admission medications    Medication Sig Start Date End Date Taking? Authorizing Provider   dicyclomine (BENTYL) 10 MG capsule TAKE 1 CAPSULE BY MOUTH 4 TIMES DAILY BEFORE MEAL(S) AND AT BEDTIME 24  Yes ProviderAlessandro MD   esomeprazole (NEXIUM) 40 MG delayed release capsule Take 1 capsule by mouth every morning (before breakfast) 24  Yes Provider, MD Alessandro   estradiol (ESTRACE) 0.1 MG/GM vaginal cream Place 2 applicators vaginally three times a week 24  Yes Provider, MD Alessandro   famotidine (PEPCID) 20 MG tablet Take 1 tablet by mouth 2 times daily 24  Yes ProviderAlessandro MD   metoprolol succinate (TOPROL XL) 25 MG extended release tablet  24  Yes ProviderAlessandro MD   amLODIPine (NORVASC) 2.5 MG tablet Take 1 tablet by mouth 2 times daily 24  Yes ProviderAlessandro MD   metFORMIN (GLUCOPHAGE-XR) 500 MG extended release tablet Take 1 tablet by mouth 2 times daily

## 2024-10-28 RX ORDER — LOSARTAN POTASSIUM 100 MG/1
100 TABLET ORAL DAILY
Qty: 90 TABLET | Refills: 3 | Status: SHIPPED | OUTPATIENT
Start: 2024-10-28

## 2024-10-28 NOTE — TELEPHONE ENCOUNTER
Caller: Tracy Pauline ZACH    Relationship: Self    Best call back number: [phone]    Requested Prescriptions:   Requested Prescriptions     Pending Prescriptions Disp Refills    losartan (COZAAR) 100 MG tablet 90 tablet 3     Sig: Take 1 tablet by mouth Daily.        Pharmacy where request should be sent: [facility] [address] [place] [address] [phone] [facility] [phone]      Last office visit with prescribing clinician: Visit date not found   Last telemedicine visit with prescribing clinician: Visit date not found   Next office visit with prescribing clinician: 12/19/2024         Does the patient have less than a 3 day supply:  [x] Yes  [] No    Would you like a call back once the refill request has been completed: [] Yes [x] No      Shankar Doty Rep   10/28/24 07:53 CDT

## 2024-10-28 NOTE — PROGRESS NOTES
Subjective        Pauline Molina presents to [facility]    History of Present Illness      74-year-old female with history of HTN, DM2, HLD referred for headaches.  She reports having headaches since she was younger.  Has daily head pain and pressure.      Earlier this year started having stabbing pains in right temple. Lasted for a few weeks. Ordered MRI because of this, reporting no concerning abnormalties. Stabbing pains went away eventually but still had some leftover right sided headache. Lasted for a few months. Now back to normal pressure in the head she has had for many years. Had been sick in January with virus prior to this starting. Has had eye exam since this happened with no new concerning findings.     Per my review of the chart.  She was seen by neurosurgery several years ago because of choroid plexus cyst.  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  She had a follow-up eye exam that showed no papilledema. Reports this happening in the 90s. Also was told she had Chiari. All headaches from this resolved after a few years.     MRI brain from 7/9/2019 reported bilateral choroid plexus cysts, partially empty appearance of the sella turcica.          Current Outpatient Medications:     amLODIPine (NORVASC) 5 MG tablet, Take 1 tablet by mouth Daily., Disp: 90 tablet, Rfl: 3    B Complex Vitamins (Vitamin-B Complex) tablet, Take 1 tablet by mouth Daily., Disp: , Rfl:     Barberry-Oreg Grape-Goldenseal (BERBERINE COMPLEX PO), Take 1 capsule by mouth Daily., Disp: , Rfl:     Coenzyme Q10 (CoQ-10) 100 MG capsule, Take 1 capsule by mouth Daily., Disp: , Rfl:     famotidine (Pepcid) 20 MG tablet, Take 1 tablet by mouth 2 (Two) Times a Day., Disp: 60 tablet, Rfl: 2    losartan (COZAAR) 100 MG tablet, Take 1 tablet by mouth Daily., Disp: 90 tablet, Rfl: 3    magnesium oxide (MAG-OX) 400 MG tablet, Take 1 tablet by mouth  Daily., Disp: , Rfl:     metFORMIN ER (GLUCOPHAGE-XR) 500 MG 24 hr tablet, Take 1 tablet by mouth Daily With Breakfast., Disp: , Rfl:     metoprolol succinate XL (TOPROL-XL) 25 MG 24 hr tablet, Take 1 tablet by mouth Daily., Disp: 30 tablet, Rfl: 2    ondansetron ODT (ZOFRAN-ODT) 4 MG disintegrating tablet, Take 1 tablet by mouth Every 8 (Eight) Hours As Needed for Nausea or Vomiting., Disp: 12 tablet, Rfl: 0    vitamin B-12 (CYANOCOBALAMIN) 1000 MCG tablet, Take 1 tablet by mouth Daily. Spray, Disp: , Rfl:     vitamin C (ASCORBIC ACID) 250 MG tablet, Take 1 tablet by mouth Daily., Disp: , Rfl:     VITAMIN D-VITAMIN K PO, Take 1 tablet by mouth Daily., Disp: , Rfl:     estradiol (ESTRACE) 0.1 MG/GM vaginal cream, Insert 2 g into the vagina 3 (Three) Times a Week. (Patient not taking: Reported on 10/29/2024), Disp: 42.5 g, Rfl: 5       Objective   Vital Signs:   /80   Pulse 73   SpO2 97%     Physical Exam  Constitutional:       General: She is awake.   Eyes:      Extraocular Movements: Extraocular movements intact.   Neurological:      Mental Status: She is alert.   Psychiatric:         Speech: Speech normal.        Neurological Exam  Mental Status  Awake and alert. Speech is normal. Language is fluent with no aphasia.    Cranial Nerves  CN III, IV, VI: Extraocular movements intact bilaterally.  CN VII: Full and symmetric facial movement.    Gait  Casual gait is normal including stance, stride, and arm swing.      Result Review :            Results          MRI BRAIN W WO CONTRAST  Order: [id number]  Impression    1. Bilateral choroid plexus cysts. The right side is slightly larger.  2. Partially empty appearance of the sella turcica.  3. Single T2 high signal area in the right frontal white matter is  likely due to minimal underlying chronic small vessel disease.  Signed by Dr Harpreet Grigsby on 7/9/2019 5:25 PM  Narrative    EXAMINATION:  MRI BRAIN WITHOUT AND WITH CONTRAST  7/9/2019 5:20 PM  HISTORY:  Intraventricular probable choroid plexus cyst.  TECHNIQUE: Multiplanar imaging was performed in a high field magnet  before and after IV gadolinium contrast administration.  COMPARISON: CT on 7/8/2019.  FINDINGS: There is a partially empty appearance of the sella turcica.  There are bilateral choroid plexus cysts. The right side is slightly  larger than the left. There are no solid enhancing lesions identified.  There is a single T2 high signal punctate area in the right frontal  white matter that is unlikely to be significant in a patient of this  age. There are no structural abnormalities. The ventricular system is  nondilated. There is a retention cyst in the right maxillary sinus.  Exam End: 07/09/19 18:15 Last Resulted: 07/09/19 18:25   Received From: [facility]  Result Received: 11/30/23 14:09     Narrative & Impression   Exam: MRI BRAIN W WO CONTRAST- 4/5/2024 1:04 PM     History: Headache, chronic, new features or increased frequency     Technique: Multisequence, multiplanar MRI of the brain was performed  prior to and after the administration of intravenous gadolinium  contrast.     Contrast: 15 MultiHance.     Comparison: None     Findings:      Ventricles and extra-axial CSF spaces are normal in size. Major vascular  flow voids are preserved.     Mild subcortical foci of T2 FLAIR hyperintensity. No abnormal  susceptibility artifact. No abnormal diffusion restriction. No abnormal  intracranial enhancement. Tiny left frontal lobe DVA.     Sellar/parasellar structures are grossly unremarkable. Tiny pineal cyst.  No tonsillar ectopia.     Status post cataracts. Small right maxillary sinus mucus retention cyst.  Mastoid air cells are grossly clear.     No suspicious calvarial or extracranial soft tissue abnormality.     IMPRESSION:  Impression:     No acute ischemia or intracranial mass lesion.     Mild white matter foci of FLAIR signal abnormality which can be seen  with chronic  migraines or chronic small vessel ischemic changes. Less  likely other etiologies.     This report was signed and finalized on 4/5/2024 3:10 PM by Rd Bynum.        Assessment and Plan     Assessment & Plan  74-year-old female with history of HTN, DM2, HLD referred for headaches.  Reports a very long history of head pressure/headaches, but that she had a new type of headache earlier in the year.  This has now resolved.  She has had side effects of medication in the past is not interested in taking anything.  When she was having increased headaches, her PCP ordered an MRI brain.  I personally reviewed this which showed minimal T2 FLAIR hyperintensities in the subcortical white matter. Likely SVID.             Follow Up   No follow-ups on file.  Patient was given instructions and counseling regarding her condition or for health maintenance advice. Please see specific information pulled into the AVS if appropriate.            No indicators present

## 2024-10-29 ENCOUNTER — OFFICE VISIT (OUTPATIENT)
Dept: NEUROLOGY | Facility: CLINIC | Age: 74
End: 2024-10-29
Payer: MEDICARE

## 2024-10-29 VITALS — OXYGEN SATURATION: 97 % | HEART RATE: 73 BPM | SYSTOLIC BLOOD PRESSURE: 120 MMHG | DIASTOLIC BLOOD PRESSURE: 80 MMHG

## 2024-10-29 DIAGNOSIS — R51.9 CHRONIC NONINTRACTABLE HEADACHE, UNSPECIFIED HEADACHE TYPE: ICD-10-CM

## 2024-10-29 DIAGNOSIS — G89.29 CHRONIC NONINTRACTABLE HEADACHE, UNSPECIFIED HEADACHE TYPE: ICD-10-CM

## 2024-10-29 DIAGNOSIS — R90.89 ABNORMAL BRAIN MRI: Primary | ICD-10-CM

## 2024-10-29 PROCEDURE — 99203 OFFICE O/P NEW LOW 30 MIN: CPT | Performed by: PSYCHIATRY & NEUROLOGY

## 2024-10-29 PROCEDURE — 1159F MED LIST DOCD IN RCRD: CPT | Performed by: PSYCHIATRY & NEUROLOGY

## 2024-10-29 PROCEDURE — 1160F RVW MEDS BY RX/DR IN RCRD: CPT | Performed by: PSYCHIATRY & NEUROLOGY

## 2024-10-29 PROCEDURE — 3074F SYST BP LT 130 MM HG: CPT | Performed by: PSYCHIATRY & NEUROLOGY

## 2024-10-29 PROCEDURE — 3079F DIAST BP 80-89 MM HG: CPT | Performed by: PSYCHIATRY & NEUROLOGY

## 2024-10-30 ENCOUNTER — OFFICE VISIT (OUTPATIENT)
Dept: GASTROENTEROLOGY | Facility: CLINIC | Age: 74
End: 2024-10-30
Payer: MEDICARE

## 2024-10-30 ENCOUNTER — LAB (OUTPATIENT)
Dept: LAB | Facility: HOSPITAL | Age: 74
End: 2024-10-30
Payer: MEDICARE

## 2024-10-30 VITALS
OXYGEN SATURATION: 99 % | BODY MASS INDEX: 32.46 KG/M2 | HEIGHT: 59 IN | WEIGHT: 161 LBS | TEMPERATURE: 97.5 F | HEART RATE: 76 BPM | DIASTOLIC BLOOD PRESSURE: 86 MMHG | SYSTOLIC BLOOD PRESSURE: 144 MMHG

## 2024-10-30 DIAGNOSIS — R13.19 ESOPHAGEAL DYSPHAGIA: ICD-10-CM

## 2024-10-30 DIAGNOSIS — Z86.0101 HISTORY OF ADENOMATOUS POLYP OF COLON: ICD-10-CM

## 2024-10-30 DIAGNOSIS — K44.9 HIATAL HERNIA WITH GERD: ICD-10-CM

## 2024-10-30 DIAGNOSIS — K21.9 HIATAL HERNIA WITH GERD: ICD-10-CM

## 2024-10-30 DIAGNOSIS — R79.89 ELEVATED LFTS: Primary | ICD-10-CM

## 2024-10-30 PROCEDURE — 36415 COLL VENOUS BLD VENIPUNCTURE: CPT | Performed by: NURSE PRACTITIONER

## 2024-10-30 PROCEDURE — 82103 ALPHA-1-ANTITRYPSIN TOTAL: CPT | Performed by: NURSE PRACTITIONER

## 2024-10-30 PROCEDURE — 3079F DIAST BP 80-89 MM HG: CPT | Performed by: NURSE PRACTITIONER

## 2024-10-30 PROCEDURE — 86015 ACTIN ANTIBODY EACH: CPT | Performed by: NURSE PRACTITIONER

## 2024-10-30 PROCEDURE — 99214 OFFICE O/P EST MOD 30 MIN: CPT | Performed by: NURSE PRACTITIONER

## 2024-10-30 PROCEDURE — 1160F RVW MEDS BY RX/DR IN RCRD: CPT | Performed by: NURSE PRACTITIONER

## 2024-10-30 PROCEDURE — 3077F SYST BP >= 140 MM HG: CPT | Performed by: NURSE PRACTITIONER

## 2024-10-30 PROCEDURE — 86381 MITOCHONDRIAL ANTIBODY EACH: CPT | Performed by: NURSE PRACTITIONER

## 2024-10-30 PROCEDURE — 1159F MED LIST DOCD IN RCRD: CPT | Performed by: NURSE PRACTITIONER

## 2024-10-30 PROCEDURE — 82390 ASSAY OF CERULOPLASMIN: CPT | Performed by: NURSE PRACTITIONER

## 2024-10-30 PROCEDURE — 86038 ANTINUCLEAR ANTIBODIES: CPT | Performed by: NURSE PRACTITIONER

## 2024-10-30 NOTE — PROGRESS NOTES
Primary Physician: Eric Espinoza MD    Chief Complaint   Patient presents with    Follow-up     Pt presents today for dysphagia and abnormal CT follow up-states she is still having issues swallowing; Pt was scheduled for endo/colon 9/27/2024 but had to cancel because she broke her arm       Subjective     Pauline Molina is a 74 y.o. female.    HPI  Abnormal imaging  7/5/2024 CT scan of the abdomen pelvis revealing Moderate thickening of the wall of the poorly distended sigmoid colon. Moderate stool seen.  and distal descending colon which is probably due to incomplete  distention. Possibility of acute nonspecific colitis cannot be excluded.     Pt was having diarrhea when I saw her 8/6/2024.  I did order her to have colonoscopy but that was not completed.    8/9/2024 CDiff negative  8/9/2024 Stool Culture Negative  8/18/2024 WBC 10.39      9/12/2024 CT Scan of Abdomen/Pelvis: mild diverticulosis, exophtic renal lesion likely a complex cyst. No pancreatic mass or inflammation.      GERD  2/13/2023 Endoscopy small HH, non obstructing Schatzki ring at GEJ, dilation up to 18mm.  Pt had been on Omeprazole 40mg once daily however, she was having more heartburn so she was switched to Pepcid bid. That has settled her heartburn more so. When I saw her 8/6/2024 I recommended endoscopy.    1/8/2024 Bone Density Study revealing osteoporosis.  6/3/2024 creatinine 0.81    Pt reports her acid reflux is under control.  She takes Pepcid twice daily.    Dysphagia  She is having trouble swallowing again with mostly pills.  This was an issue the last time I saw her but she had to cancel her endoscopy due to falling and breaking her arm.  She states her previous esophageal stretching was beneficial.        Personal Hx Adenomatous Colon Polyps  Patient is up-to-date on her colonoscopy.  Last colonoscopy was June 19, 2021 with 1 tubular adenomatous polyp at the transverse colon.  5-year recall given.     She uses daily Flax  Seed  Pt using prn Miralax with good results.  No blood in her stools.     Fatty Liver  Ultrasound of the liver collected 2022 revealed increased echogenicity of the liver suggesting steatosis.  No focal hepatic mass seen.   Labs 11/15/2022: ALKP 149, AST 44, ALT 28 and Bili 0.5  Pt does NOT drink any alcohol.     Pt reports pancreatitis 12 years ago.  Had endoscopy with EUS by Dr Hastings 2010: normal pancreas, bile duct, and ampulla of vater.     2024 ALT 22 AST 29   ALKP 180 ()    Bili 0.7  2024 ALT17 AST 41 (1-32), ALKP 156 (), Bili 0.2  6/3/2024 ALT/AST normal, bili 0.4, ALKP 207. Fib-4 Score 1.16    2024 Fib-4 Score 1.56  ..Fib-4 scoring system  Points <1.45:                        Cirrhosis less likely  Points >=1.45 and <=3.25:       Indeterminate  Points >3.25:                        Cirrhosis more likely     2024 CT Scan of the Abdomen/Pelvis: liver appeared normal.  2024 CT Scan of Abd & Pelvis: liver no significant abnormality    Previous Hep C negative antibody  and pt had elevated ALKP at this time    Past Medical History:   Diagnosis Date    Arrhythmia     CTS (carpal tunnel syndrome) ?    Family history of colonic polyps     GERD (gastroesophageal reflux disease)     History of adenomatous polyp of colon     History of colon polyps     HL (hearing loss) ?    Hyperlipidemia     Hypertension     Memory loss ?    MVP (mitral valve prolapse)     Pseudotumor cerebri     Type 2 diabetes mellitus        Past Surgical History:   Procedure Laterality Date    CARDIAC CATHETERIZATION      CATARACT EXTRACTION Right 08/15/2021    CATARACT EXTRACTION Left 2021     SECTION      CHOLECYSTECTOMY      COLONOSCOPY  2015    One 3-4mm hyperplastic polyp in the rectum; Repeat 5 years    COLONOSCOPY  2007    Normal; Repeat 3-4 years    COLONOSCOPY  2004    One 2cm pedunculated erythematous adenomatous polyp in the sigmoid colon; One 6mm  hyperplastic polyp in the rectum; Repeat 3 years    COLONOSCOPY N/A 06/18/2021    One 7mm tubular adenomatous polyp in the transverse colon; The examination was otherwise normal on direct and retroflexion views; Repeat 5 years    DILATATION AND CURETTAGE      ENDOSCOPY  09/30/2009    Normal endoscopy    ENDOSCOPY  08/30/2004    GERD    ENDOSCOPY N/A 02/13/2023    Small HH; Non-obstructing Schatzki ring-Dilated; Normal stomach; Normal examined duodenum; No specimens collected    HERNIA REPAIR      HYSTERECTOMY      LAPAROSCOPIC LYSIS OF ADHESIONS      OOPHORECTOMY      OTHER SURGICAL HISTORY  06/14/2010    EUS-Dr. Hastings-Normal EUS evaluation of the pancreas, bile duct, and ampulla of Vater-pancreatitis remains idiopathic        Current Outpatient Medications:     amLODIPine (NORVASC) 5 MG tablet, Take 1 tablet by mouth Daily., Disp: 90 tablet, Rfl: 3    B Complex Vitamins (Vitamin-B Complex) tablet, Take 1 tablet by mouth Daily., Disp: , Rfl:     Barberry-Oreg Grape-Goldenseal (BERBERINE COMPLEX PO), Take 1 capsule by mouth Daily., Disp: , Rfl:     Coenzyme Q10 (CoQ-10) 100 MG capsule, Take 1 capsule by mouth Daily., Disp: , Rfl:     losartan (COZAAR) 100 MG tablet, Take 1 tablet by mouth Daily., Disp: 90 tablet, Rfl: 3    magnesium oxide (MAG-OX) 400 MG tablet, Take 1 tablet by mouth Daily., Disp: , Rfl:     metFORMIN ER (GLUCOPHAGE-XR) 500 MG 24 hr tablet, Take 1 tablet by mouth Daily With Breakfast., Disp: , Rfl:     ondansetron ODT (ZOFRAN-ODT) 4 MG disintegrating tablet, Take 1 tablet by mouth Every 8 (Eight) Hours As Needed for Nausea or Vomiting., Disp: 12 tablet, Rfl: 0    vitamin B-12 (CYANOCOBALAMIN) 1000 MCG tablet, Take 1 tablet by mouth Daily. Spray, Disp: , Rfl:     vitamin C (ASCORBIC ACID) 250 MG tablet, Take 1 tablet by mouth Daily., Disp: , Rfl:     VITAMIN D-VITAMIN K PO, Take 1 tablet by mouth Daily., Disp: , Rfl:     estradiol (ESTRACE) 0.1 MG/GM vaginal cream, Insert 2 g into the vagina 3  "(Three) Times a Week. (Patient not taking: Reported on 9/19/2024), Disp: 42.5 g, Rfl: 5    famotidine (Pepcid) 20 MG tablet, Take 1 tablet by mouth 2 (Two) Times a Day., Disp: 180 tablet, Rfl: 2    metoprolol succinate XL (TOPROL-XL) 25 MG 24 hr tablet, Take 1 tablet by mouth Daily., Disp: 90 tablet, Rfl: 2    Allergies   Allergen Reactions    Lactose Intolerance (Gi) GI Intolerance     Takes Lactaid       Social History     Socioeconomic History    Marital status:    Tobacco Use    Smoking status: Never    Smokeless tobacco: Never   Vaping Use    Vaping status: Never Used   Substance and Sexual Activity    Alcohol use: Never    Drug use: Never    Sexual activity: Not Currently     Partners: Male     Birth control/protection: Post-menopausal, Hysterectomy       Family History   Problem Relation Age of Onset    Heart disease Mother     Prostate cancer Father     Colon polyps Father         > 60 years of age    Breast cancer Maternal Grandmother     Stomach cancer Paternal Grandmother 48    Colon cancer Neg Hx     Esophageal cancer Neg Hx     Liver cancer Neg Hx     Liver disease Neg Hx     Rectal cancer Neg Hx        Review of Systems   Constitutional:  Negative for unexpected weight change.   HENT:  Positive for trouble swallowing.        Objective     /86 (BP Location: Right arm, Patient Position: Sitting, Cuff Size: Adult)   Pulse 76   Temp 97.5 °F (36.4 °C) (Infrared)   Ht 149.9 cm (59\")   Wt 73 kg (161 lb)   SpO2 99%   Breastfeeding No   BMI 32.52 kg/m²     Physical Exam  Vitals reviewed.   Constitutional:       Appearance: Normal appearance.   Cardiovascular:      Rate and Rhythm: Normal rate and regular rhythm.      Heart sounds: Normal heart sounds.   Pulmonary:      Effort: Pulmonary effort is normal.      Breath sounds: Normal breath sounds.   Neurological:      Mental Status: She is alert.         Lab Results - Last 18 Months   Lab Units 09/12/24  1429 08/18/24  0239 07/05/24  0739 " 06/03/24  0913 04/05/24  1336 02/29/24  1330 11/30/23  1327 05/24/23  1028   GLUCOSE mg/dL 104* 253*  --  143*  --  104* 109* 99   BUN mg/dL 22 23  --  24*  --  26* 22 18   CREATININE mg/dL 0.82 0.73 1.00 0.81 0.80 0.96 0.79 0.71   SODIUM mmol/L 141 136  --  139  --  140 138 143   POTASSIUM mmol/L 4.1 5.1  --  4.8  --  4.3 5.2 4.4   CHLORIDE mmol/L 104 100  --  101  --  101 102 105   CO2 mmol/L 25.0 23.0  --  26.5  --  27.0 27.2 29.5*   TOTAL PROTEIN g/dL 7.5 6.9  --   --   --   --   --   --    ALBUMIN g/dL 4.6 4.2  --  4.4  --   --   --  4.6   ALT (SGPT) U/L 22 17  --  17  --   --   --  20   AST (SGOT) U/L 29 41*  --  18  --   --   --  31   ALK PHOS U/L 180* 156*  --  207*  --   --   --  178*   BILIRUBIN mg/dL 0.7 0.2  --  0.4  --   --   --  0.5   GLOBULIN gm/dL 2.9 2.7  --   --   --   --   --   --    SED RATE mm/hr  --   --   --   --   --  3  --   --        Lab Results - Last 18 Months   Lab Units 09/12/24  1429 08/18/24  0438 08/18/24  0107 06/03/24  0913 02/29/24  1330 05/24/23  1028   HEMOGLOBIN g/dL 13.5 12.4 14.1 14.5 15.2 14.8   HEMATOCRIT % 40.4 35.7 39.3 43.7 45.1 43.5   MCV fL 90.6 90.6 90.1 93.4 91.3 92.0   WBC 10*3/mm3 6.22 11.66* 10.39 6.23 5.12 5.14   RDW % 13.4 13.2 13.1 13.6 13.5 12.9   MPV fL 10.0 10.3 10.0  --   --   --    PLATELETS 10*3/mm3 286 258 274 270 264 275   INR   --   --  0.86*  --   --   --        Lab Results - Last 18 Months   Lab Units 06/03/24  0913 05/24/23  1028   TSH uIU/mL 2.090 2.240        Narrative & Impression   EXAMINATION:  CT ABDOMEN PELVIS W CONTRAST-  9/12/2024 2:31 PM     HISTORY: Left upper quadrant pain.     TECHNIQUE: Spiral CT was performed of the abdomen and pelvis with IV  contrast. Multiplanar images were reconstructed.     DLP: 627.88 mGy.cm Automated dosage reduction technique was utilized to  reduce patient dose.     COMPARISON: 7/5/2024.     LUNG BASES: There is mild atelectasis in the lung bases. There is a 1.1  cm right breast nodule that appears relatively  stable on mammography  performed on 1/8/2024, 4/26/2021 and 9/3/2019.     LIVER AND SPLEEN: Prior cholecystectomy. The liver and spleen  demonstrate no significant abnormality. Calcified splenic granulomas are  noted.     PANCREAS: No pancreatic mass or inflammatory change.     KIDNEYS AND ADRENALS: The adrenal glands and left kidney demonstrate no  focal abnormality. There is a 1.3 cm right renal lesion that is mildly  hyperdense measuring 27 Hounsfield units. This lesion did not  demonstrate any significant enhancement on a recent multiphase CT on  7/5/2024. The ureters are nondilated. The urinary bladder is not well  distended.     BOWEL: No oral contrast was given. There is under distention of the  stomach. Small bowel loops are nondilated. The appendix is not  visualized. There likely been prior appendectomy. There is relative  under distention of portions of colon. There are scattered colon  diverticula. No CT findings of diverticulitis.     OTHER: There is thinning of the anterior abdominal wall musculature.  There is a mesh device along the anterior abdominal wall related to  prior hernia repair. There is atheromatous disease of the aortoiliac  vessels. No lymphadenopathy is seen. There has been prior hysterectomy.  There are degenerative changes of the spine and bilateral hips.        IMPRESSION:  1. A 1.1 cm right breast nodule appears stable mammographically dating  back to 9/3/2019.  2. Prior cholecystectomy and hysterectomy. Prior mesh anterior abdominal  wall hernia repair.  3. A 1.3 cm mildly hyperdense exophytic renal lesion on the right is  likely a complex cyst. A recent multiphase CT did not demonstrate any  significant contrast enhancement of this lesion.  4. Mild diverticulosis of the colon. Under distention of stomach and  colon. No small bowel distention.  5. Other nonacute findings, as discussed.     This report was signed and finalized on 9/12/2024 4:17 PM by Dr. Harpreet Grigsby MD.          IMPRESSION/PLAN:    Assessment & Plan      Problem List Items Addressed This Visit       History of adenomatous polyp of colon    Overview     Last colonoscopy 6/19/2021: Adenomatous polyp of the transverse colon, 5-year recall         Esophageal dysphagia    Overview     Dysphagia with pills mostly. Pt reports her previous esophageal dilatation was beneficial.    Endoscopy 2/2023 shows small hiatal hernia with Schatzki's ring dilated to 18 mm.             Current Assessment & Plan     Plan for endoscopy with possible dilatation per Dr. Kim.  This has been beneficial in the past.         Elevated LFTs - Primary    Overview     Secondary to hepatic steatosis  Labs 11/15/2022: AST ALKP 149  Normal LFT's 4/6/2022 9/12/2024 ALT 22 AST 29   ALKP 180 ()    Bili 0.7  8/18/2024 ALT17 AST 41 (1-32), ALKP 156 (), Bili 0.2  6/3/2024 ALT/AST normal, bili 0.4, ALKP 207. Fib-4 Score 1.16    9/12/2024 Fib-4 Score 1.56  ..Fib-4 scoring system  Points <1.45:                        Cirrhosis less likely  Points >=1.45 and <=3.25:       Indeterminate  Points >3.25:                        Cirrhosis more likely    7/5/2024 CT Scan of the Abdomen/Pelvis: liver appeared normal.  9/12/2024 CT Scan of Abd & Pelvis: liver no significant abnormality         Relevant Orders    NATALYA (Completed)    Mitochondrial Antibodies, M2 (Completed)    Anti-Smooth Muscle Antibody Titer (Completed)    Ceruloplasmin (Completed)    Alpha - 1 - Antitrypsin (Completed)    Hiatal hernia with GERD    Overview     Asymptomatic at this time.  Endoscopy 2/2023 does not show esophagitis.  Small hiatal hernia with Schatzki's ring dilated to 18 mm done.  Started on Omeprazole 40 mg daily and recently started having increased reflux over past couple months so switched to Pepcid BID and that has helped.    Anti-reflux measures were reviewed and discussed with patient.  Pt advised to refrain from chocolate, alcohol, smoking, peppermint and caffeine.   Also advised to limit fatty foods, large meals, and eating late at nighttime.  Advised to reach an ideal body mass index.          Endoscopy per Dr Kim  Recall colonoscopy 2026          ..The risks, benefits, and alternatives of colonoscopy were reviewed with the patient today.  Risks including perforation of the colon possibly requiring surgery or colostomy.  Additional risks include risk of bleeding from biopsies or removal of colon tissue.  There is also the risk of a drug reaction or problems with anesthesia.  This will be discussed with the further by the anesthesia team on the day of the procedure.  Lastly there is a possibility of missing a colon polyp or cancer.  The benefits include the diagnosis and management of disease of the colon and rectum.  Alternatives to colonoscopy include barium enema, laboratory testing, radiographic evaluation, or no intervention.  The patient verbalizes understanding and agrees.    In accordance with requirements under the Affordable Care Act, [facility] has provided pricing for all hospital services and items on each of its websites. However, a patient's actual cost may differ based on the services the patient receives to meet individual healthcare needs and based on the benefits provided under the patient’s insurance coverage.        Genna Martinez, APRN  11/01/24  10:48 CDT    Part of this note may be an electronic transcription/translation of spoken language to printed text.

## 2024-10-31 DIAGNOSIS — K21.9 HIATAL HERNIA WITH GERD: ICD-10-CM

## 2024-10-31 DIAGNOSIS — K44.9 HIATAL HERNIA WITH GERD: ICD-10-CM

## 2024-10-31 LAB
ALPHA1 GLOB MFR UR ELPH: 140 MG/DL (ref 90–200)
ANA SER QL: NEGATIVE
CERULOPLASMIN SERPL-MCNC: 17 MG/DL (ref 19–39)
MITOCHONDRIA M2 IGG SER-ACNC: <20 UNITS (ref 0–20)
SMA IGG SER-ACNC: 3 UNITS (ref 0–19)

## 2024-10-31 RX ORDER — FAMOTIDINE 20 MG/1
20 TABLET, FILM COATED ORAL 2 TIMES DAILY
Qty: 180 TABLET | Refills: 2 | Status: SHIPPED | OUTPATIENT
Start: 2024-10-31

## 2024-10-31 RX ORDER — METOPROLOL SUCCINATE 25 MG/1
25 TABLET, EXTENDED RELEASE ORAL
Qty: 90 TABLET | Refills: 2 | Status: SHIPPED | OUTPATIENT
Start: 2024-10-31

## 2024-10-31 NOTE — TELEPHONE ENCOUNTER
Caller: Pauline Molina    Relationship: Self    Best call back number: [phone]     Requested Prescriptions:   Requested Prescriptions     Pending Prescriptions Disp Refills    famotidine (Pepcid) 20 MG tablet 60 tablet 2     Sig: Take 1 tablet by mouth 2 (Two) Times a Day.    METROPOLOL ER  25 MG (NOT XL HAS BEEN CHANGED TO ER PER PATIENT    Pharmacy where request should be sent: [facility] [address] [place] [address] [phone] [facility] [phone]      Last office visit with prescribing clinician: Visit date not found   Last telemedicine visit with prescribing clinician: Visit date not found   Next office visit with prescribing clinician: 12/19/2024     Additional details provided by patient:     Does the patient have less than a 3 day supply:  [] Yes  [x] No    Would you like a call back once the refill request has been completed: [] Yes [x] No    If the office needs to give you a call back, can they leave a voicemail: [] Yes [x] No    Cy Hoyt III, [facility] Rep   10/31/24 11:41 CDT

## 2024-11-01 ENCOUNTER — PATIENT ROUNDING (BHMG ONLY) (OUTPATIENT)
Dept: NEUROLOGY | Facility: CLINIC | Age: 74
End: 2024-11-01
Payer: MEDICARE

## 2024-11-01 NOTE — PROGRESS NOTES
November 1, 2024    Hello, may I speak with Pauline Molina?    My name is TANYA      I am  with [facility]  [address]  [facility] [address] [phone].    Before we get started may I verify your date of birth? 1950    I am calling to officially welcome you to our practice and ask about your recent visit. Is this a good time to talk? yes    Tell me about your visit with us. What things went well?  I THINK MY APPOINTMENT WENT GOOD AND NO LEFTOVER CONCERNS       We're always looking for ways to make our patients' experiences even better. Do you have recommendations on ways we may improve?  no    Overall were you satisfied with your first visit to our practice? yes       I appreciate you taking the time to speak with me today. Is there anything else I can do for you? no      Thank you, and have a great day.

## 2024-11-04 ENCOUNTER — TELEPHONE (OUTPATIENT)
Dept: GASTROENTEROLOGY | Facility: CLINIC | Age: 74
End: 2024-11-04
Payer: MEDICARE

## 2024-11-04 NOTE — TELEPHONE ENCOUNTER
Placed pt in recall for labs for 6/2025-she will get a reminder letter 2 months ahead of time reminding her to call the office to discuss how to get the labs ordered and completed before her f/u with Sobia.

## 2024-11-04 NOTE — TELEPHONE ENCOUNTER
----- Message from Genna Martinez sent at 11/4/2024 12:19 PM CST -----  Ronit, can you make a note for me please.  I just sent Kaylie a message to give this patient a follow-up in June 2025.  The follow-up is for elevated LFTs.  I will want labs prior to that office visit.  Specifically I want a CBC & CMP.  Can you make a note for that for me please.  Genna

## 2024-12-10 ENCOUNTER — OFFICE VISIT (OUTPATIENT)
Age: 74
End: 2024-12-10
Payer: MEDICARE

## 2024-12-10 DIAGNOSIS — S42.332D CLOSED DISPLACED OBLIQUE FRACTURE OF SHAFT OF LEFT HUMERUS WITH ROUTINE HEALING, SUBSEQUENT ENCOUNTER: Primary | ICD-10-CM

## 2024-12-10 PROCEDURE — 1123F ACP DISCUSS/DSCN MKR DOCD: CPT | Performed by: ORTHOPAEDIC SURGERY

## 2024-12-10 PROCEDURE — 1159F MED LIST DOCD IN RCRD: CPT | Performed by: ORTHOPAEDIC SURGERY

## 2024-12-10 PROCEDURE — 99213 OFFICE O/P EST LOW 20 MIN: CPT | Performed by: ORTHOPAEDIC SURGERY

## 2024-12-10 NOTE — PROGRESS NOTES
negative   Chest pain.   GI negative   Abdominal pain and Vomiting.    negative   Urinary incontinence.   Neuro negative   Headache.   Psych negative   Psychiatric symptoms.       PHYSICAL EXAM:    There were no vitals taken for this visit.    GENERAL: No distress.  ORIENTATION: Alert and oriented to time, place, person.  MOOD AND AFFECT: Cooperative and pleasant.  Ortho Exam   Mild swelling of the forearm wrist and hand.  She is nontender over the humeral shaft.  She has very limited motion of the shoulder.  She has full motion motion of the elbow wrist and hand her radial nerve is intact.  She can forward flex today to little past 90 degrees.    Radiology:   XR HUMERUS LEFT (MIN 2 VIEWS)    Result Date: 10/18/2024  AP and a lateral view of the patient's left humerus today demonstrates she has maintained excellent alignment.  There is still a fracture line present but there does appear to be significant healing with some callus.      XR HUMERUS LEFT (MIN 2 VIEWS)    Result Date: 12/10/2024  AP and internal rotated view of the patient's left humerus demonstrates a long oblique fracture.  There is a small amount of fracture callus medially.  There is still a large fracture gap laterally in excess of a centimeter.          Assessment:   74-year-old female with a left humeral shaft fracture is a long oblique fracture with minimal displacement.  There is some fracture callus medially.  Is not completely filled in.  Still recommended we watch this for a longer due to the fact she is not have significant pain.    Encounter Diagnosis   Name Primary?    Closed displaced oblique fracture of shaft of left humerus with routine healing, subsequent encounter Yes        Plan:  She can continue activities as tolerated I will see her back in 2 months..    No follow-ups on file.     Orders Placed This Encounter    XR HUMERUS LEFT (MIN 2 VIEWS)     Standing Status:   Future     Number of Occurrences:   1     Standing Expiration

## 2024-12-12 ENCOUNTER — TELEPHONE (OUTPATIENT)
Age: 74
End: 2024-12-12

## 2024-12-13 ENCOUNTER — TELEPHONE (OUTPATIENT)
Dept: GASTROENTEROLOGY | Facility: CLINIC | Age: 74
End: 2024-12-13
Payer: MEDICARE

## 2024-12-13 NOTE — TELEPHONE ENCOUNTER
Pt called me this morning stating she needs to cancel her endoscopy scheduled for 10:30 this morning. She is still dealing with issues from a broken arm. She tells me she can't lay on that side and she is scared she will re-injure it if she comes to her procedure. She also tells me she is facing possible surgery for it and she would like to get through that before having endo done so she doesn't want to r/s at this time. I am going to place her in recall for 5/2025-since I work the list 2 months ahead of time she will show up on the list in March. I will call her at that time to regroup if she hasn't called back to r/s. I did encourage pt to keep f/u with Sobia in May-worst case we can get her procedure rescheduled at that time if needed. I called Dawn in endo and she will take pt off of Dr. Kim's schedule today.

## 2024-12-13 NOTE — TELEPHONE ENCOUNTER
Patient advised to increase activity as tolerated, as pain allows.  Advised to not lift greater than 5-10lbs per Dr Mathias

## 2025-01-08 NOTE — PROGRESS NOTES
Subjective    Ms. Molina is 75 y.o. female    Chief Complaint: Follow-up multiple urologic concerns    History of Present Illness    75-year-old female established patient in for follow-up regarding multiple urologic concerns including overactive bladder and atrophic vaginitis, along with right renal lesion.    Patient previously provided with 6 weeks sample pack of Gemtesa due to report of urinary frequency and urgency companied with occasional episodes of inability to make it to the bathroom in time requiring pad usage.  Patient reports she never started taking the medication as she researched and found that the medication contains lactose containing substance.    Patient with previous report of vaginal irritation and burning.  Was prescribed vaginal estrogen cream for vaginal atrophy.  Patient reports was used for the first few weeks however has not been using as of recent as has had difficulty remembering.  Has used occasional natural coconut oil for which reports has seemed to be of benefit.      Currently reporting overall symptoms are not affecting activities of daily living.  Is not interested in any further overactive bladder medication treatment.     occasional mild leakage from coughing, laughing, sneezing or position change.  Reports suprapubic pressure and at times feeling of incomplete emptying.  Denies external bulge.     Previously Underwent CT with and without contrast revealing what appears to be a fallen bladder as well as a small area within a small area within the right mid kidney lateral margin 1.2 cm too small to fully characterize showing Hounsfield units of 38 .recommending follow-up evaluation. Repeat CT renal mass protocol showing area appearing more cystic in density with 12 HU and measuring 1.4cm without significant contrast-enhancement at 14.4 Hounsfield units.    The following portions of the patient's history were reviewed and updated as appropriate: allergies, current medications,  past family history, past medical history, past social history, past surgical history and problem list.    Review of Systems   Constitutional:  Negative for chills, fatigue and fever.   Gastrointestinal:  Negative for nausea and vomiting.   Genitourinary:  Positive for frequency and urgency. Negative for flank pain.         Current Outpatient Medications:     amLODIPine (NORVASC) 5 MG tablet, Take 1 tablet by mouth Daily., Disp: 90 tablet, Rfl: 3    B Complex Vitamins (Vitamin-B Complex) tablet, Take 1 tablet by mouth Daily., Disp: , Rfl:     Barberry-Oreg Grape-Goldenseal (BERBERINE COMPLEX PO), Take 1 capsule by mouth Daily., Disp: , Rfl:     Coenzyme Q10 (CoQ-10) 100 MG capsule, Take 1 capsule by mouth Daily., Disp: , Rfl:     estradiol (ESTRACE) 0.1 MG/GM vaginal cream, Insert 2 g into the vagina 3 (Three) Times a Week., Disp: 42.5 g, Rfl: 5    famotidine (Pepcid) 20 MG tablet, Take 1 tablet by mouth 2 (Two) Times a Day., Disp: 180 tablet, Rfl: 2    losartan (COZAAR) 100 MG tablet, Take 1 tablet by mouth Daily., Disp: 90 tablet, Rfl: 3    magnesium oxide (MAG-OX) 400 MG tablet, Take 1 tablet by mouth Daily., Disp: , Rfl:     metFORMIN ER (GLUCOPHAGE-XR) 500 MG 24 hr tablet, Take 1 tablet by mouth Daily With Breakfast., Disp: , Rfl:     metoprolol succinate XL (TOPROL-XL) 25 MG 24 hr tablet, Take 1 tablet by mouth Daily., Disp: 90 tablet, Rfl: 2    ondansetron ODT (ZOFRAN-ODT) 4 MG disintegrating tablet, Take 1 tablet by mouth Every 8 (Eight) Hours As Needed for Nausea or Vomiting., Disp: 12 tablet, Rfl: 0    vitamin B-12 (CYANOCOBALAMIN) 1000 MCG tablet, Take 1 tablet by mouth Daily. Spray, Disp: , Rfl:     vitamin C (ASCORBIC ACID) 250 MG tablet, Take 1 tablet by mouth Daily., Disp: , Rfl:     VITAMIN D-VITAMIN K PO, Take 1 tablet by mouth Daily., Disp: , Rfl:     Past Medical History:   Diagnosis Date    Arrhythmia     CTS (carpal tunnel syndrome) ?    Family history of colonic polyps     GERD  (gastroesophageal reflux disease)     History of adenomatous polyp of colon     History of colon polyps     HL (hearing loss) ?    Hyperlipidemia     Hypertension     Memory loss ?    MVP (mitral valve prolapse)     Pseudotumor cerebri     Type 2 diabetes mellitus        Past Surgical History:   Procedure Laterality Date    CARDIAC CATHETERIZATION      CATARACT EXTRACTION Right 08/15/2021    CATARACT EXTRACTION Left 2021     SECTION      CHOLECYSTECTOMY      COLONOSCOPY  2015    One 3-4mm hyperplastic polyp in the rectum; Repeat 5 years    COLONOSCOPY  2007    Normal; Repeat 3-4 years    COLONOSCOPY  2004    One 2cm pedunculated erythematous adenomatous polyp in the sigmoid colon; One 6mm hyperplastic polyp in the rectum; Repeat 3 years    COLONOSCOPY N/A 2021    One 7mm tubular adenomatous polyp in the transverse colon; The examination was otherwise normal on direct and retroflexion views; Repeat 5 years    DILATATION AND CURETTAGE      ENDOSCOPY  2009    Normal endoscopy    ENDOSCOPY  2004    GERD    ENDOSCOPY N/A 2023    Small HH; Non-obstructing Schatzki ring-Dilated; Normal stomach; Normal examined duodenum; No specimens collected    HERNIA REPAIR      HYSTERECTOMY      LAPAROSCOPIC LYSIS OF ADHESIONS      OOPHORECTOMY      OTHER SURGICAL HISTORY  2010    EUS-Dr. Hastings-Normal EUS evaluation of the pancreas, bile duct, and ampulla of Vater-pancreatitis remains idiopathic       Social History     Socioeconomic History    Marital status:    Tobacco Use    Smoking status: Never    Smokeless tobacco: Never   Vaping Use    Vaping status: Never Used   Substance and Sexual Activity    Alcohol use: Never    Drug use: Never    Sexual activity: Not Currently     Partners: Male     Birth control/protection: Post-menopausal, Hysterectomy       Family History   Problem Relation Age of Onset    Heart disease Mother     Prostate cancer Father     Colon polyps  Father         > 60 years of age    Breast cancer Maternal Grandmother     Stomach cancer Paternal Grandmother 48    Colon cancer Neg Hx     Esophageal cancer Neg Hx     Liver cancer Neg Hx     Liver disease Neg Hx     Rectal cancer Neg Hx        Objective    There were no vitals taken for this visit.    Physical Exam  Nursing note reviewed.   Constitutional:       General: She is not in acute distress.     Appearance: Normal appearance. She is not ill-appearing.   HENT:      Nose: No congestion.   Abdominal:      Tenderness: There is no right CVA tenderness or left CVA tenderness.      Hernia: No hernia is present.   Skin:     General: Skin is warm and dry.   Neurological:      Mental Status: She is alert and oriented to person, place, and time.   Psychiatric:         Mood and Affect: Mood normal.         Behavior: Behavior normal.             Results for orders placed or performed in visit on 01/14/25   POC Urinalysis Dipstick, Multipro    Collection Time: 01/14/25  1:19 PM    Specimen: Urine   Result Value Ref Range    Color Yellow Yellow, Straw, Dark Yellow, Taniya    Clarity, UA Clear Clear    Glucose, UA Negative Negative mg/dL    Bilirubin Negative Negative    Ketones, UA Negative Negative    Specific Gravity  1.005 1.005 - 1.030    Blood, UA Negative Negative    pH, Urine 6.5 5.0 - 8.0    Protein, POC Negative Negative mg/dL    Urobilinogen, UA 0.2 E.U./dL Normal, 0.2 E.U./dL    Nitrite, UA Negative Negative    Leukocytes Negative Negative   CT Abdomen Kidney With & Without Contrast (01/13/2025 10:23)   Assessment and Plan    Diagnoses and all orders for this visit:    1. Urge incontinence (Primary)  -     POC Urinalysis Dipstick, Multipro    2. Renal lesion  -     POC Urinalysis Dipstick, Multipro  -     CT Abdomen Kidney With & Without Contrast; Future    3. Dysuria  -     POC Urinalysis Dipstick, Multipro        Overall patient reports urinary incontinence is tolerable is not affecting activities of daily  living.  Is not interested in trying other medication options.  Patient never started using the Gemtesa due to an ingredient patient researched.    Patient states she is going to start back on the vaginal estrogen cream as she had gotten out of the routine of using as she did feel as though this was beneficial vaginally-have encouraged patient to contact our office when needing refill    CT renal mass protocol completed area previously seen in the right mid lateral kidney seems to be more cystic in nature as the density now showing at 12 HU without significant contrast enhancement at 14.4 HU.  Overall measuring 1.4 cm.  Significant growth.  Aced on this finding recommend repeat follow-up imaging in 1 year

## 2025-01-13 ENCOUNTER — HOSPITAL ENCOUNTER (OUTPATIENT)
Dept: CT IMAGING | Facility: HOSPITAL | Age: 75
Discharge: HOME | End: 2025-01-13
Payer: MEDICARE

## 2025-01-13 DIAGNOSIS — N28.9 RENAL LESION: ICD-10-CM

## 2025-01-13 LAB — CREAT BLDA-MCNC: 0.9 MG/DL (ref 0.6–1.3)

## 2025-01-13 PROCEDURE — 74170 CT ABD WO CNTRST FLWD CNTRST: CPT

## 2025-01-13 PROCEDURE — 82565 ASSAY OF CREATININE: CPT

## 2025-01-13 PROCEDURE — 25510000001 IOPAMIDOL 61 % SOLUTION

## 2025-01-13 RX ORDER — IOPAMIDOL 612 MG/ML
100 INJECTION, SOLUTION INTRAVASCULAR
Status: COMPLETED | OUTPATIENT
Start: 2025-01-13 | End: 2025-01-13

## 2025-01-13 RX ADMIN — IOPAMIDOL 100 ML: 612 INJECTION, SOLUTION INTRAVENOUS at 10:24

## 2025-01-14 ENCOUNTER — OFFICE VISIT (OUTPATIENT)
Dept: UROLOGY | Facility: CLINIC | Age: 75
End: 2025-01-14
Payer: MEDICARE

## 2025-01-14 DIAGNOSIS — N39.41 URGE INCONTINENCE: Primary | ICD-10-CM

## 2025-01-14 DIAGNOSIS — N28.9 RENAL LESION: ICD-10-CM

## 2025-01-14 DIAGNOSIS — R30.0 DYSURIA: ICD-10-CM

## 2025-01-14 LAB
BILIRUB BLD-MCNC: NEGATIVE MG/DL
CLARITY, POC: CLEAR
COLOR UR: YELLOW
GLUCOSE UR STRIP-MCNC: NEGATIVE MG/DL
KETONES UR QL: NEGATIVE
LEUKOCYTE EST, POC: NEGATIVE
NITRITE UR-MCNC: NEGATIVE MG/ML
PH UR: 6.5 [PH] (ref 5–8)
PROT UR STRIP-MCNC: NEGATIVE MG/DL
RBC # UR STRIP: NEGATIVE /UL
SP GR UR: 1 (ref 1–1.03)
UROBILINOGEN UR QL: NORMAL

## 2025-01-15 ENCOUNTER — OFFICE VISIT (OUTPATIENT)
Dept: INTERNAL MEDICINE | Facility: CLINIC | Age: 75
End: 2025-01-15
Payer: MEDICARE

## 2025-01-15 VITALS
WEIGHT: 157 LBS | DIASTOLIC BLOOD PRESSURE: 76 MMHG | HEIGHT: 59 IN | OXYGEN SATURATION: 98 % | BODY MASS INDEX: 31.65 KG/M2 | TEMPERATURE: 98.1 F | SYSTOLIC BLOOD PRESSURE: 134 MMHG | HEART RATE: 76 BPM

## 2025-01-15 DIAGNOSIS — K76.0 FATTY LIVER DISEASE, NONALCOHOLIC: ICD-10-CM

## 2025-01-15 DIAGNOSIS — E66.09 CLASS 1 OBESITY DUE TO EXCESS CALORIES WITH SERIOUS COMORBIDITY AND BODY MASS INDEX (BMI) OF 31.0 TO 31.9 IN ADULT: ICD-10-CM

## 2025-01-15 DIAGNOSIS — E03.9 ACQUIRED HYPOTHYROIDISM: ICD-10-CM

## 2025-01-15 DIAGNOSIS — I10 ESSENTIAL HYPERTENSION: Chronic | ICD-10-CM

## 2025-01-15 DIAGNOSIS — S42.302G CLOSED FRACTURE OF SHAFT OF LEFT HUMERUS WITH DELAYED HEALING, UNSPECIFIED FRACTURE MORPHOLOGY, SUBSEQUENT ENCOUNTER: ICD-10-CM

## 2025-01-15 DIAGNOSIS — Z63.6 CAREGIVER STRESS: ICD-10-CM

## 2025-01-15 DIAGNOSIS — G47.33 OSA ON CPAP: ICD-10-CM

## 2025-01-15 DIAGNOSIS — E66.811 CLASS 1 OBESITY DUE TO EXCESS CALORIES WITH SERIOUS COMORBIDITY AND BODY MASS INDEX (BMI) OF 31.0 TO 31.9 IN ADULT: ICD-10-CM

## 2025-01-15 DIAGNOSIS — E11.65 TYPE 2 DIABETES MELLITUS WITH HYPERGLYCEMIA, WITHOUT LONG-TERM CURRENT USE OF INSULIN: Primary | ICD-10-CM

## 2025-01-15 LAB — HBA1C MFR BLD: 6.4 % (ref 4.5–5.7)

## 2025-01-15 SDOH — SOCIAL STABILITY - SOCIAL INSECURITY: DEPENDENT RELATIVE NEEDING CARE AT HOME: Z63.6

## 2025-01-15 NOTE — H&P
Chief Complaint:   Colon polyps    Subjective     HPI:   She has had adenomatous colon polyps in the past.  Last colonoscopy was over 5 years ago.  She is here for ongoing surveillance.    Past Medical History:   Past Medical History:   Diagnosis Date   • Arrhythmia    • Family history of colonic polyps    • GERD (gastroesophageal reflux disease)    • History of adenomatous polyp of colon    • History of colon polyps    • Hyperlipidemia    • Hypertension    • MVP (mitral valve prolapse)    • Pseudotumor cerebri    • Type 2 diabetes mellitus (CMS/HCC)        Past Surgical History:  Past Surgical History:   Procedure Laterality Date   • CARDIAC CATHETERIZATION     •  SECTION     • CHOLECYSTECTOMY     • COLONOSCOPY  2015    One 3-4mm hyperplastic polyp in the rectum; Repeat 5 years   • COLONOSCOPY  2007    Normal; Repeat 3-4 years   • COLONOSCOPY  2004    One 2cm pedunculated erythematous adenomatous polyp in the sigmoid colon; One 6mm hyperplastic polyp in the rectum; Repeat 3 years   • DILATATION AND CURETTAGE     • ENDOSCOPY  2009    Normal endoscopy   • ENDOSCOPY  2004    GERD   • HERNIA REPAIR     • HYSTERECTOMY     • LAPAROSCOPIC LYSIS OF ADHESIONS     • OOPHORECTOMY     • OTHER SURGICAL HISTORY  2010    EUS-Dr. Hastings-Normal EUS evaluation of the pancreas, bile duct, and ampulla of Vater-pancreatitis remains idiopathic        Family History:  Family History   Problem Relation Age of Onset   • Prostate cancer Father    • Colon polyps Father         > 60 years of age   • Stomach cancer Paternal Grandmother 48   • Heart disease Mother    • Colon cancer Neg Hx    • Esophageal cancer Neg Hx    • Liver cancer Neg Hx    • Liver disease Neg Hx    • Rectal cancer Neg Hx        Social History:   reports that she has never smoked. She has never used smokeless tobacco. She reports previous alcohol use. Drug use questions deferred to the physician.    Medications:   Medications  Occupational Therapy Evaluation    Visit Type: Initial Evaluation  Visit: 1  Referring Provider: Hussein Hayes MD  Medical Diagnosis (from order): Z02.6 - Encounter related to worker's compensation claim  M25.522 - Left elbow pain   Treatment Diagnosis: left shoulder, left elbow - increased pain/symptoms, impaired posture, impaired range of motion, impaired muscle length/flexibility, impaired joint play/mobility, impaired motor function/performance/coordination, impaired scapulohumeral rhythm, impaired strength, impaired activity tolerance, impaired coordination and impaired body mechanics.  Onset  - Date of onset: 7/2/2024 fall at work  Chart reviewed at time of initial evaluation (relevant co-morbidities, allergies, tests and medications listed):          SUBJECTIVE                                                                                                                #[id number] Nenita.     Patient was at work on 7/2/24 going down into the basement to use the bathroom when she slipped on the smooth slippery stairs landing on her left elbow. She caught her fall with her left arm on a metal railing and then struck the posterior left upper arm on the edge of the concrete floor.     Had an injection that did help some with her pain.  Has inflammation in her left posterior shoulder. Reports difficulty with sleeping due to pain. Has been sleeping on her right side. At times will sleep on her stomach with her left arm resting on a pillow. Has some strength in her left arm. When she does her job continuously has a burning sensation in her left posterior shoulder and at triceps. Then she will take a break when she can. Has been using a compression sleeve at work and it give her some support. Edema present in her left medial elbow. Has difficulty with reaching her left arm behind her back and pain her her scapula. Is limited in her ability to lift and carry. Reports fluctuating edema with increased edema  "Prior to Admission   Medication Sig Dispense Refill Last Dose   • amLODIPine (NORVASC) 5 MG tablet Take 1 tablet by mouth Daily. 30 tablet 11 6/17/2021 at Unknown time   • Barberry-Oreg Grape-Goldenseal (BERBERINE COMPLEX PO) Take 1 capsule by mouth Daily.   6/16/2021   • losartan (COZAAR) 100 MG tablet Take 1 tablet by mouth once daily 90 tablet 3 6/18/2021 at 0400   • metFORMIN ER (GLUCOPHAGE-XR) 500 MG 24 hr tablet Take 1 tablet by mouth Daily With Breakfast. 90 tablet 3 6/17/2021 at Unknown time   • metoprolol tartrate (LOPRESSOR) 50 MG tablet Take 1 tablet by mouth Daily. 90 tablet 3 6/18/2021 at 0400   • ASPIRIN 81 PO Take 1 capsule by mouth Daily.   6/16/2021   • Coenzyme Q10 (CoQ-10) 100 MG capsule Take 1 capsule by mouth Daily.   6/16/2021   • Fenugreek 500 MG capsule Take 1 capsule by mouth Daily.   6/16/2021   • vitamin D3 125 MCG (5000 UT) capsule capsule Take 1 capsule by mouth Daily.   6/16/2021       Allergies:  Lactase    ROS:    General: Weight stable  Resp: No SOA  Cardiovascular: No CP      Objective     /72 (Patient Position: Sitting)   Pulse 68   Temp 97.1 °F (36.2 °C) (Temporal)   Resp 20   Ht 152.4 cm (60\")   Wt 81.6 kg (180 lb)   SpO2 96%   Breastfeeding No   BMI 35.15 kg/m²     Physical Exam   Constitutional: Pt is oriented to person, place, and in no distress.  Eyes: No icterus  ENMT: Unremarkable   Cardiovascular: Normal rate, regular rhythm.    Pulmonary/Chest: No distress.  No audible wheezes  Abdominal: Soft.   Skin: Skin is warm and dry.   Psychiatric: Mood, memory, affect and judgment appear normal.     Assessment/Plan     Diagnosis:  Colon polyps    Anticipated Surgical Procedure:  Colonoscopy    The risks, benefits, and alternatives of colonoscopy were reviewed with the patient today.  Risks including perforation of the colon possibly requiring surgery or colostomy.  Additional risks include risk of bleeding from biopsies or removal of colon tissue.  There is also the " when she's doing work tasks.  Work has not been following her restrictions per her report. Her hours have been cut, but is still doing full duty work type tasks.     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 Has to set up a metal perea for bottles using both hands to manipulate the bottle and the metal perea to put it in place. To fill the grain--has to disconnect the 4 wheeled cart (similar to a wheelbarrow, but with 4 wheels) and pull it over to the feeder. Has to move it manually as the machine normally used to move it is broken. To feed the bigger calves has to fill buckets with grain and dump them into a trough to feed the calves.   Has to weigh the  calves. Has to push the  cows onto the scale and then back off again. Washing out buckets.  Has been using pain cream Amirt/lidocaine cream prescribed by the doctor and that has been helpful. It makes her arm feel numb.    Pain / Symptoms  - Pain rating (out of 10): Current: 4 ; Best: 4; Worst: 10  - Location: Left posterior elbow, left posterior shoulder/scapula  - Quality / Description: burning, throbbing, hot  - Alleviating Factors: rest     - Pain cream    Function:   Limitations / Exacerbation Factors:   - Patient reports pain, difficulty and increased time with function reported below.  - meal/food prep, sleep disturbed, grooming/hygiene/self-care activities, upper body dressing, lower body dressing, fine motor tasks, grocery shopping, house/yard work, opening doors, work - limited or modified, writing, driving/riding in a vehicle, lifting/carrying, pushing/pulling and reaching  Prior Level of Function: declining function, therefore referred to therapy,  Personal Occupations Profile Affected: upper body dressing, lower body dressing, toileting/toilet hygiene, personal hygiene/grooming, feeding, bathing/showering,  driving, home establishment/managements, sleep participation, shopping, health management/maintenance, meal preparation/cleanup, rest/sleep preparation, job performance    Patient Goals: decreased pain, independence with ADLs/IADLs, return to sport/leisure activities, return to work, increased motion and increased strength.    Prior treatment  - outpatient OT  - Discharged from hospital, home health, or skilled nursing facility in last 30 days: no  Home Environment   - Patient lives with: significant other and children  - Assistance available: as needed  - Denies 2 or more falls or an unexplained fall with injury in the last year.  - Feel safe at home / work / school: yes    Worker's Compensation Information  Occupation Information  - Occupation: cow feeder  - Employer:  [employer]  [address] [place] [address]  [place] [address]     - Full Duty Work Demands: heavy (more than 50 lbs)  - lifting from floor, chest height lifting and rotational/twisting motions    OBJECTIVE                                                                                                                    Posture:  - Shoulders: rounded  LUE:     - Scapula Resting Position: elevated, anterior tilt and winging (2 finger winging)  RUE:     - Scapula Resting Position: winging (1 finger winging);     Observation   Raised bumps present at left posterior upper arm at lateral-superior triceps. Will continue to monitor.    Tender to palpation at left posterior triceps   Hypertonicity present in left upper trapezius, infraspinatus, teres minor, middle trapezius, latissimus dorsi, subscapularis, pec major/minor, triceps    Range of Motion (ROM)   (degrees unless noted; active unless noted; norms in ( ); negative=lacking to 0, positive=beyond 0)  Shoulder:    - Flexion (180):         Left:160  Pain (pain in posterior shoulder)     - Extension (50):         Left: 38 (pain in posterior shoulder) pain    - Scaption:         Left: 145    - Internal Rotation:  risk of a drug reaction or problems with anesthesia.  This will be discussed with the further by the anesthesia team on the day of the procedure.  Lastly there is a possibility of missing a colon polyp or cancer.  The benefits include the diagnosis and management of disease of the colon and rectum.  Alternatives to colonoscopy include barium enema, laboratory testing, radiographic evaluation, or no intervention.  The patient verbalizes understanding and agrees.    Much of this encounter note is an electronic transcription/translation of spoken language to printed text. The electronic translation of spoken language may permit erroneous, or at times, nonsensical words or phrases to be inadvertently transcribed; although I have reviewed the note for such errors, some may still exist.                  Left: pain        - at 0°:            Left: 45 (burning pain in left medial elbow)    - External Rotation:       - at 0°:             Left: 34 (pain in left posterior shoulder)  Elbow/Forearm:    - Flexion (140-150):       Left:  138     - Extension (0):       Left: -20 pain (left posterior elbow at triceps)                       Outcome/Assessments  Outcome Measures:   Quick Disabilities of the Arm, Shoulder and Hand: QuickDash Total Score (Score will not calculate if more then 2 questions are left blank): 45.45   (scored 0-100; a higher score indicates greater disability) see flowsheet for additional documentation        Treatment     Therapeutic Exercise  Educated in initial HEP, see below. Printed exercises    Manual Therapy   Application of ktape web cuts to posterior shoulder at infraspinatus and teres minor. Application of ktape web cut to distal-medial triceps. Applied web cuts for pain control. Educated patient regarding tape wear, care & precautions.    Therapeutic Activity  Initiated instruction on anatomy and how it pertains to diagnosis., Reviewed plan of care with patient and agreed on treatment plan and goals.  Educated patient regarding activity modification strategies as it relates to their diagnosis. Educated to reach with her left arm in a thumb up or palm up position when possible to help decrease pain.      Skilled input: verbal instruction/cues and as detailed above    Writer verbally educated and received verbal consent for hand placement, positioning of patient, and techniques to be performed today from patient for clothing adjustments for techniques, hand placement and palpation for techniques and therapist position for techniques as described above and how they are pertinent to the patient's plan of care.  Home Exercise Program  Access Code: [email or web address]  Printed exercises    URL: [email or web address]  Date: 01/15/2025  Prepared by: Tiki  Zhang    Exercises  - Standing Upper Trapezius Stretch  - 2 x daily - 7 x weekly - 1 sets - 10 reps - 5 hold  - Corner Pec Minor Stretch  - 2 x daily - 7 x weekly - 1 sets - 10 reps - 5 hold  - Seated Scapular Retraction  - 2 x daily - 7 x weekly - 1 sets - 10 reps      ASSESSMENT                                                                                                          39 year old patient has reported functional limitations listed above impacted by signs and symptoms consistent with treatment diagnosis below.  Treatment Diagnosis:   - Involved: left shoulder, left elbow.  - Symptoms/impairments: increased pain/symptoms, impaired posture, impaired range of motion, impaired muscle length/flexibility, impaired joint play/mobility, impaired motor function/performance/coordination, impaired scapulohumeral rhythm, impaired strength, impaired activity tolerance, impaired coordination and impaired body mechanics.      Patient presents with left shoulder and elbow pain in her triceps after a fall at work. She has pain, muscle hypertonicity, impaired scapular position at rest, impaired range of motion, decreased strength, impaired scapulohumeral rhythm and impaired function as evidenced by their QDASH score.    Patient would benefit from skilled Occupational therapy to increase strength/stability, increase range of motion, decrease pain, improve muscle coordination, improve joint mobility, improve activities of daily living and instrumental activites of daily living to address functional limitations in ADLs and IADLs above.  Pain/symptoms after session (out of 10): 3    Prognosis: patient will benefit from skilled therapy  Rehabilitative potential is: good.  Clinical decision making: Moderate - Patient has several limitations (3-5), comorbidities and/or complexities, as noted in detailed assessment above, that impact their occupational profile.  Resulting in several treatment options and minimal to moderate  task modification consistent with moderate clinical decision making complexity.  Education:   - Present and ready to learn: patient  - Results of above outlined education: Verbalizes understanding and Demonstrates understanding    PLAN                                                                                                                         The following skilled interventions to be implemented to achieve goals listed below:  Neuromuscular Re-Education (11033)  Therapeutic Activity (14218)  Therapeutic Exercise (84916)  Manual Therapy (35764)  Activities of Daily Living/Self Care (64271)  Dry Needling  Heat/Cold (02780)  Ultrasound (51887)  Electrical Stimulation Unattended (72452 or )    Frequency / Duration  2 times per week tapering as patient progresses for 12 weeks for an estimated total of 12 visits    Patient involved in and agreed to plan of care and goals.  Patient given attendance policy at time of initial evaluation.    Suggestions for next session as indicated: Progress per plan of care    Review HEP  Assess reaction to ktape web cuts for pain management  Wall slides into flexion, scaption focused on scapular control  STM, cupping as needed  Progress scapular, rotator cuff, elbow strengthening as appropriate  Body mechanics with lifting, reaching, push/pull      Goals  Long Term Goals: to be met by end of plan of care   1. Patient/Client will be able to one hand carry 30 pounds 40 feet to allow them to carry buckets of grain at work.    2. Patient/Client will be able to push/pull 100 pounds 40 feet to assist with pushing and pulling activities at work (pushing a cart of grain or a calf onto a scale).  3. Quick DASH: Patient will score 15 or lower on The Quick DASH to indicate a decreased level of impairment with lifting, carrying, household and self-care activity. (minimal clinically important difference: 14 of calculated score)   4. Patient will be independent with progressed and  modified home exercise program.  5.  Patient/Client will demonstrate 4+/5 strength of left shoulder and elbow to assist with their ability to lift, push and pull at work.    This note sent for referring provider signature        Therapy procedure time and total treatment time can be found documented on the Time Entry flowsheet

## 2025-01-15 NOTE — PROGRESS NOTES
"      Chief Complaint  Hypertension, Diabetes (A1c:  6.4), pneumo vaccine, and CPAP    Subjective        Pauline Molina presents to [facility]    HPI    Patient here for the above problems.  See Assessment and Plan for further HPI components.      Review of Systems    Objective   Vital Signs:  /76 (BP Location: Left arm, Patient Position: Sitting, Cuff Size: Adult)   Pulse 76   Temp 98.1 °F (36.7 °C) (Temporal)   Ht 149.9 cm (59\")   Wt 71.2 kg (157 lb)   SpO2 98%   BMI 31.71 kg/m²   Estimated body mass index is 31.71 kg/m² as calculated from the following:    Height as of this encounter: 149.9 cm (59\").    Weight as of this encounter: 71.2 kg (157 lb).      Physical Exam  Vitals and nursing note reviewed.   Constitutional:       Appearance: She is not ill-appearing.   Eyes:      General: No scleral icterus.     Conjunctiva/sclera: Conjunctivae normal.   Pulmonary:      Effort: Pulmonary effort is normal. No respiratory distress.   Skin:     General: Skin is warm.      Coloration: Skin is not pale.   Neurological:      General: No focal deficit present.      Mental Status: She is alert and oriented to person, place, and time.   Psychiatric:         Mood and Affect: Mood normal.         Behavior: Behavior normal.                       Assessment and Plan   Diagnoses and all orders for this visit:    1. Type 2 diabetes mellitus with hyperglycemia, without long-term current use of insulin (Primary)  -     POC Glycated Hemoglobin, Total    2. Closed fracture of shaft of left humerus with delayed healing, unspecified fracture morphology, subsequent encounter    3. Fatty liver disease, nonalcoholic    4. Class 1 obesity due to excess calories with serious comorbidity and body mass index (BMI) of 31.0 to 31.9 in adult    5. Acquired hypothyroidism    6. YENNY on CPAP    7. Essential hypertension    8. Caregiver stress        History of Present Illness  The patient presents for " evaluation of arm fracture, diabetes mellitus, and sleep apnea.    She sustained an arm fracture in August 2024, which has been healing slowly. She reports a gradual improvement in pain levels. An x-ray is scheduled for February 2025 to assess the healing progress. She initiated physical therapy approximately 10 weeks post-injury but discontinued home exercises due to perceived lack of healing progress. Following cessation of home therapy, she noted an improvement in her condition. She has incorporated bone broth protein into her diet and maintains an active lifestyle, including cooking and reaching for items in cabinets.    Her diabetes management has been challenging, with her A1c level increasing from 5.6 to 6.4. She attributes this rise to a decrease in appetite following her arm fracture, which led to a significant drop in her blood glucose levels. Despite this, she expresses satisfaction with her current A1c level, given the holiday season and increased food intake. She believes that her diabetes is not impeding the healing process of her arm.    She recently acquired a CPAP machine and has been using it consistently for the past two nights, reporting positive results.    Supplemental Information  She had a consultation with urology yesterday and has a CT scan scheduled. She does not typically receive vaccines but has received influenza vaccines in the past.    FAMILY HISTORY  Her mother has dementia and is on palliative care.    MEDICATIONS  Current: Rybelsus, gabapentin    IMMUNIZATIONS  She has received influenza vaccines in the past.      Assessment & Plan  1. Arm fracture.  The healing process of the arm fracture is progressing, albeit slowly. There is evidence of callus formation, indicative of healing, but a gap exceeding one centimeter persists. She is advised to maintain physical activity and mobility. An x-ray is scheduled for February 2025 to further evaluate the healing process.    2. Diabetes  mellitus.  Her A1c level has increased to 6.4, which is still within an acceptable range. The target A1c level is set below 6.5. If the A1c level rises to 6.6, dietary modifications, specifically reducing carbohydrate and bread intake, will be recommended.    3. Sleep apnea.  She has recently started using a CPAP machine and has been compliant for the past two nights. Documentation of CPAP usage will be maintained to ensure continued provision of necessary supplies.  CPAP compliance reviewed and patient is compliance with use, reports improvement in apnea-related symptoms.  Continue CPAP therapy.    4. Patient has hypothyroidism.  Continue current management.  5. History of fatty liver continue to monitor with CMPs  6. Caregiver stress and fatigue - Patient cares for her elderly mother with dementia which at times can be stressful  7. Hypertension well controlled.        Result Review :  The following data was reviewed by: Eric Espinoza MD on 01/15/2025:  CMP          8/18/2024    02:39 9/12/2024    14:29 1/13/2025    10:03   CMP   Glucose 253  104     BUN 23  22     Creatinine 0.73  0.82  0.90    EGFR 87.5  76.1     Sodium 136  141     Potassium 5.1  4.1     Chloride 100  104     Calcium 9.0  9.5     Total Protein 6.9  7.5     Albumin 4.2  4.6     Globulin 2.7  2.9     Total Bilirubin 0.2  0.7     Alkaline Phosphatase 156  180     AST (SGOT) 41  29     ALT (SGPT) 17  22     Albumin/Globulin Ratio 1.6  1.6     BUN/Creatinine Ratio 31.5  26.8     Anion Gap 13.0  12.0       CBC          6/3/2024    09:13 8/18/2024    01:07 8/18/2024    04:38 9/12/2024    14:29   CBC   WBC 6.23  10.39  11.66  6.22    RBC 4.68  4.36  3.94  4.46    Hemoglobin 14.5  14.1  12.4  13.5    Hematocrit 43.7  39.3  35.7  40.4    MCV 93.4  90.1  90.6  90.6    MCH 31.0  32.3  31.5  30.3    MCHC 33.2  35.9  34.7  33.4    RDW 13.6  13.1  13.2  13.4    Platelets 270  274  258  286      CBC w/diff          6/3/2024    09:13 8/18/2024    01:07  8/18/2024    04:38 9/12/2024    14:29   CBC w/Diff   WBC 6.23  10.39  11.66  6.22    RBC 4.68  4.36  3.94  4.46    Hemoglobin 14.5  14.1  12.4  13.5    Hematocrit 43.7  39.3  35.7  40.4    MCV 93.4  90.1  90.6  90.6    MCH 31.0  32.3  31.5  30.3    MCHC 33.2  35.9  34.7  33.4    RDW 13.6  13.1  13.2  13.4    Platelets 270  274  258  286    Neutrophil Rel % 52.9  77.1  81.2  62.2    Immature Granulocyte Rel %  0.4  0.4  0.2    Lymphocyte Rel % 29.5  13.3  10.9  24.6    Monocyte Rel % 11.2  7.4  6.9  9.0    Eosinophil Rel % 5.5  1.4  0.3  3.5    Basophil Rel % 0.6  0.4  0.3  0.5      Lipid Panel          6/3/2024    09:13   Lipid Panel   Total Cholesterol 234    Triglycerides 585    HDL Cholesterol 32    VLDL Cholesterol 100    LDL Cholesterol  102      TSH          6/3/2024    09:13   TSH   TSH 2.090      A1C Last 3 Results          6/3/2024    09:13 9/19/2024    10:36 1/15/2025    14:35   HGBA1C Last 3 Results   Hemoglobin A1C 6.70  5.6  6.4      Microalbumin          6/3/2024    09:13   Microalbumin   Microalbumin, Urine 7.5      UA          7/16/2024    09:50 9/12/2024    14:43 1/14/2025    13:19   Urinalysis   Specific Gravity, UA  <=1.005     Ketones, UA Negative  Negative  Negative    Blood, UA  Negative     Leukocytes, UA Negative  Negative  Negative    Nitrite, UA  Negative                                Follow Up   Return in about 6 months (around 7/15/2025), or if symptoms worsen or fail to improve, for follow up for above problems. [facility] care., Medicare Wellness - Labs prior to visit.  Patient was given instructions and counseling regarding her condition or for health maintenance advice. Please see specific information pulled into the AVS if appropriate.       SUPRIYA Espinoza MD, FACP, FHM      Electronically signed by Eric Espinoza MD, 01/15/25, 5:32 PM CST.    Patient or patient representative verbalized consent for the use of Ambient Listening during the visit with  Eric Espinoza MD  for chart documentation. 1/19/2025  17:47 CST

## 2025-02-11 ENCOUNTER — OFFICE VISIT (OUTPATIENT)
Age: 75
End: 2025-02-11
Payer: MEDICARE

## 2025-02-11 DIAGNOSIS — S42.332D CLOSED DISPLACED OBLIQUE FRACTURE OF SHAFT OF LEFT HUMERUS WITH ROUTINE HEALING, SUBSEQUENT ENCOUNTER: Primary | ICD-10-CM

## 2025-02-11 DIAGNOSIS — M89.8X2 PAIN OF LEFT HUMERUS: ICD-10-CM

## 2025-02-11 PROCEDURE — 1159F MED LIST DOCD IN RCRD: CPT | Performed by: ORTHOPAEDIC SURGERY

## 2025-02-11 PROCEDURE — 1123F ACP DISCUSS/DSCN MKR DOCD: CPT | Performed by: ORTHOPAEDIC SURGERY

## 2025-02-11 PROCEDURE — 99213 OFFICE O/P EST LOW 20 MIN: CPT | Performed by: ORTHOPAEDIC SURGERY

## 2025-02-11 NOTE — PROGRESS NOTES
[facility] Note - Established Patient    NAME:  Lizz Vasquez   : 1950  MRN: [id number]      2025      CHIEF COMPLAINT: had no chief complaint listed for this encounter.      HISTORY OF PRESENT ILLNESS:    74-year-old female presents today for follow-up for a left humeral shaft fracture she is doing okay without any significant complaints.  She is 6 months postinjury.  She describes her self is getting better.    Past Medical History:        Diagnosis Date    Anxiety     Headache(784.0)     Hypertension     Type II or unspecified type diabetes mellitus without mention of complication, not stated as uncontrolled        Past Surgical History:        Procedure Laterality Date    ABDOMEN SURGERY      to remove growth off of fallopian tube.     APPENDECTOMY      Pt also had a mass on tubes she had removed with this surgery     SECTION      CHOLECYSTECTOMY      DILATION AND CURETTAGE OF UTERUS  ?    w/Ablation?    HERNIA REPAIR      HYSTERECTOMY (CERVIX STATUS UNKNOWN)      total       Current Medications:   Prior to Admission medications    Medication Sig Start Date End Date Taking? Authorizing Provider   VITAMIN D-VITAMIN K PO Take 1 tablet by mouth daily    Alessandro Hernandez MD   ascorbic acid (VITAMIN C) 250 MG tablet Take 1 tablet by mouth daily    Alessandro Hernandez MD   B Complex Vitamins (VITAMIN-B COMPLEX) TABS Take 1 tablet by mouth daily    Alessandro Hernandez MD   vitamin B-12 (CYANOCOBALAMIN) 1000 MCG tablet Take 1 tablet by mouth daily    Alessandro Hernandez MD   dicyclomine (BENTYL) 10 MG capsule TAKE 1 CAPSULE BY MOUTH 4 TIMES DAILY BEFORE MEAL(S) AND AT BEDTIME 24   Alessandro Hernandez MD   esomeprazole (NEXIUM) 40 MG delayed release capsule Take 1 capsule by mouth every morning (before breakfast) 24   Alessandro Hernandez MD   estradiol (ESTRACE) 0.1 MG/GM vaginal cream Place 2 applicators vaginally three times a week 24

## 2025-03-03 ENCOUNTER — HOSPITAL ENCOUNTER (OUTPATIENT)
Dept: MAMMOGRAPHY | Facility: HOSPITAL | Age: 75
Discharge: HOME | End: 2025-03-03
Admitting: NURSE PRACTITIONER
Payer: MEDICARE

## 2025-03-03 DIAGNOSIS — Z12.31 BREAST CANCER SCREENING BY MAMMOGRAM: ICD-10-CM

## 2025-03-03 PROCEDURE — 77063 BREAST TOMOSYNTHESIS BI: CPT

## 2025-03-03 PROCEDURE — 77067 SCR MAMMO BI INCL CAD: CPT

## 2025-04-08 ENCOUNTER — TELEPHONE (OUTPATIENT)
Dept: GASTROENTEROLOGY | Facility: CLINIC | Age: 75
End: 2025-04-08
Payer: MEDICARE

## 2025-04-08 NOTE — TELEPHONE ENCOUNTER
Stephan called me just now because she rec'd my letter reminding her that you wanted labs prior to her f/u with you in May. I did advise that you were out of the office so it would be next week before you would see my message. Can you place orders for CBC and CMP? She will be able to come by the lab to have those done prior to her appt with you. I will call her back once orders are placed.

## 2025-04-14 DIAGNOSIS — K76.0 FATTY LIVER DISEASE, NONALCOHOLIC: Primary | ICD-10-CM

## 2025-04-14 DIAGNOSIS — R79.89 ELEVATED LFTS: ICD-10-CM

## 2025-04-15 NOTE — TELEPHONE ENCOUNTER
Called and spoke to pt-advised her lab orders were placed. She has appt with PCP tomorrow and will try to have them done at that time. Pt advised to call me back with any further questions/problems, otherwise she will keep f/u with Sobia as scheduled in a couple of weeks.

## 2025-04-16 ENCOUNTER — OFFICE VISIT (OUTPATIENT)
Dept: INTERNAL MEDICINE | Facility: CLINIC | Age: 75
End: 2025-04-16
Payer: MEDICARE

## 2025-04-16 ENCOUNTER — LAB (OUTPATIENT)
Dept: LAB | Facility: HOSPITAL | Age: 75
End: 2025-04-16
Payer: MEDICARE

## 2025-04-16 VITALS
TEMPERATURE: 97.8 F | WEIGHT: 170 LBS | DIASTOLIC BLOOD PRESSURE: 82 MMHG | SYSTOLIC BLOOD PRESSURE: 140 MMHG | HEIGHT: 59 IN | BODY MASS INDEX: 34.27 KG/M2 | OXYGEN SATURATION: 97 % | HEART RATE: 73 BPM

## 2025-04-16 DIAGNOSIS — R53.83 FATIGUE, UNSPECIFIED TYPE: ICD-10-CM

## 2025-04-16 DIAGNOSIS — E66.811 CLASS 1 OBESITY DUE TO EXCESS CALORIES WITH SERIOUS COMORBIDITY AND BODY MASS INDEX (BMI) OF 34.0 TO 34.9 IN ADULT: ICD-10-CM

## 2025-04-16 DIAGNOSIS — E11.65 TYPE 2 DIABETES MELLITUS WITH HYPERGLYCEMIA, WITHOUT LONG-TERM CURRENT USE OF INSULIN: Primary | ICD-10-CM

## 2025-04-16 DIAGNOSIS — G47.33 OSA ON CPAP: ICD-10-CM

## 2025-04-16 DIAGNOSIS — E66.09 CLASS 1 OBESITY DUE TO EXCESS CALORIES WITH SERIOUS COMORBIDITY AND BODY MASS INDEX (BMI) OF 34.0 TO 34.9 IN ADULT: ICD-10-CM

## 2025-04-16 DIAGNOSIS — E03.8 SUBCLINICAL HYPOTHYROIDISM: ICD-10-CM

## 2025-04-16 DIAGNOSIS — I10 ESSENTIAL HYPERTENSION: Chronic | ICD-10-CM

## 2025-04-16 LAB
ALBUMIN SERPL-MCNC: 4.2 G/DL (ref 3.5–5.2)
ALBUMIN/GLOB SERPL: 1.5 G/DL
ALP SERPL-CCNC: 133 U/L (ref 39–117)
ALT SERPL W P-5'-P-CCNC: 22 U/L (ref 1–33)
ANION GAP SERPL CALCULATED.3IONS-SCNC: 11 MMOL/L (ref 5–15)
AST SERPL-CCNC: 23 U/L (ref 1–32)
BASOPHILS # BLD AUTO: 0.03 10*3/MM3 (ref 0–0.2)
BASOPHILS NFR BLD AUTO: 0.5 % (ref 0–1.5)
BILIRUB SERPL-MCNC: 0.2 MG/DL (ref 0–1.2)
BUN SERPL-MCNC: 25 MG/DL (ref 8–23)
BUN/CREAT SERPL: 34.2 (ref 7–25)
CALCIUM SPEC-SCNC: 9.3 MG/DL (ref 8.6–10.5)
CHLORIDE SERPL-SCNC: 105 MMOL/L (ref 98–107)
CO2 SERPL-SCNC: 25 MMOL/L (ref 22–29)
CREAT SERPL-MCNC: 0.73 MG/DL (ref 0.57–1)
DEPRECATED RDW RBC AUTO: 42 FL (ref 37–54)
EGFRCR SERPLBLD CKD-EPI 2021: 87 ML/MIN/1.73
EOSINOPHIL # BLD AUTO: 0.28 10*3/MM3 (ref 0–0.4)
EOSINOPHIL NFR BLD AUTO: 5 % (ref 0.3–6.2)
ERYTHROCYTE [DISTWIDTH] IN BLOOD BY AUTOMATED COUNT: 12.7 % (ref 12.3–15.4)
GLOBULIN UR ELPH-MCNC: 2.8 GM/DL
GLUCOSE SERPL-MCNC: 113 MG/DL (ref 65–99)
HBA1C MFR BLD: 6.4 % (ref 4.5–5.7)
HCT VFR BLD AUTO: 40.6 % (ref 34–46.6)
HGB BLD-MCNC: 13.4 G/DL (ref 12–15.9)
IMM GRANULOCYTES # BLD AUTO: 0.01 10*3/MM3 (ref 0–0.05)
IMM GRANULOCYTES NFR BLD AUTO: 0.2 % (ref 0–0.5)
LYMPHOCYTES # BLD AUTO: 1.7 10*3/MM3 (ref 0.7–3.1)
LYMPHOCYTES NFR BLD AUTO: 30.3 % (ref 19.6–45.3)
MCH RBC QN AUTO: 30 PG (ref 26.6–33)
MCHC RBC AUTO-ENTMCNC: 33 G/DL (ref 31.5–35.7)
MCV RBC AUTO: 90.8 FL (ref 79–97)
MONOCYTES # BLD AUTO: 0.57 10*3/MM3 (ref 0.1–0.9)
MONOCYTES NFR BLD AUTO: 10.2 % (ref 5–12)
NEUTROPHILS NFR BLD AUTO: 3.02 10*3/MM3 (ref 1.7–7)
NEUTROPHILS NFR BLD AUTO: 53.8 % (ref 42.7–76)
NRBC BLD AUTO-RTO: 0 /100 WBC (ref 0–0.2)
PLATELET # BLD AUTO: 260 10*3/MM3 (ref 140–450)
PMV BLD AUTO: 10 FL (ref 6–12)
POTASSIUM SERPL-SCNC: 4.3 MMOL/L (ref 3.5–5.2)
PROT SERPL-MCNC: 7 G/DL (ref 6–8.5)
RBC # BLD AUTO: 4.47 10*6/MM3 (ref 3.77–5.28)
SODIUM SERPL-SCNC: 141 MMOL/L (ref 136–145)
TSH SERPL DL<=0.05 MIU/L-ACNC: 1.92 UIU/ML (ref 0.27–4.2)
WBC NRBC COR # BLD AUTO: 5.61 10*3/MM3 (ref 3.4–10.8)

## 2025-04-16 PROCEDURE — 80053 COMPREHEN METABOLIC PANEL: CPT | Performed by: INTERNAL MEDICINE

## 2025-04-16 PROCEDURE — 82746 ASSAY OF FOLIC ACID SERUM: CPT | Performed by: INTERNAL MEDICINE

## 2025-04-16 PROCEDURE — 84443 ASSAY THYROID STIM HORMONE: CPT | Performed by: INTERNAL MEDICINE

## 2025-04-16 PROCEDURE — 36415 COLL VENOUS BLD VENIPUNCTURE: CPT | Performed by: INTERNAL MEDICINE

## 2025-04-16 PROCEDURE — 82607 VITAMIN B-12: CPT | Performed by: INTERNAL MEDICINE

## 2025-04-16 PROCEDURE — 85025 COMPLETE CBC W/AUTO DIFF WBC: CPT | Performed by: INTERNAL MEDICINE

## 2025-04-16 RX ORDER — LANOLIN ALCOHOL/MO/W.PET/CERES
1000 CREAM (GRAM) TOPICAL DAILY
COMMUNITY

## 2025-04-17 LAB
ALBUMIN SERPL-MCNC: 4.4 G/DL (ref 3.5–5.2)
ALBUMIN/GLOB SERPL: 1.7 G/DL
ALP SERPL-CCNC: 148 U/L (ref 39–117)
ALT SERPL-CCNC: 17 U/L (ref 1–33)
AST SERPL-CCNC: 21 U/L (ref 1–32)
BASOPHILS # BLD AUTO: 0.04 10*3/MM3 (ref 0–0.2)
BASOPHILS NFR BLD AUTO: 0.7 % (ref 0–1.5)
BILIRUB SERPL-MCNC: 0.2 MG/DL (ref 0–1.2)
BUN SERPL-MCNC: 24 MG/DL (ref 8–23)
BUN/CREAT SERPL: 30.8 (ref 7–25)
CALCIUM SERPL-MCNC: 9.6 MG/DL (ref 8.6–10.5)
CHLORIDE SERPL-SCNC: 103 MMOL/L (ref 98–107)
CO2 SERPL-SCNC: 24.5 MMOL/L (ref 22–29)
CREAT SERPL-MCNC: 0.78 MG/DL (ref 0.57–1)
EGFRCR SERPLBLD CKD-EPI 2021: 80.3 ML/MIN/1.73
EOSINOPHIL # BLD AUTO: 0.31 10*3/MM3 (ref 0–0.4)
EOSINOPHIL NFR BLD AUTO: 5.3 % (ref 0.3–6.2)
ERYTHROCYTE [DISTWIDTH] IN BLOOD BY AUTOMATED COUNT: 13 % (ref 12.3–15.4)
FOLATE SERPL-MCNC: 10.5 NG/ML (ref 4.78–24.2)
FOLATE SERPL-MCNC: 13.1 NG/ML (ref 4.78–24.2)
GLOBULIN SER CALC-MCNC: 2.6 GM/DL
GLUCOSE SERPL-MCNC: 106 MG/DL (ref 65–99)
HCT VFR BLD AUTO: 39.7 % (ref 34–46.6)
HGB BLD-MCNC: 13.7 G/DL (ref 12–15.9)
IMM GRANULOCYTES # BLD AUTO: 0.01 10*3/MM3 (ref 0–0.05)
IMM GRANULOCYTES NFR BLD AUTO: 0.2 % (ref 0–0.5)
LYMPHOCYTES # BLD AUTO: 1.93 10*3/MM3 (ref 0.7–3.1)
LYMPHOCYTES NFR BLD AUTO: 33.1 % (ref 19.6–45.3)
MCH RBC QN AUTO: 31.4 PG (ref 26.6–33)
MCHC RBC AUTO-ENTMCNC: 34.5 G/DL (ref 31.5–35.7)
MCV RBC AUTO: 90.8 FL (ref 79–97)
MONOCYTES # BLD AUTO: 0.66 10*3/MM3 (ref 0.1–0.9)
MONOCYTES NFR BLD AUTO: 11.3 % (ref 5–12)
NEUTROPHILS # BLD AUTO: 2.88 10*3/MM3 (ref 1.7–7)
NEUTROPHILS NFR BLD AUTO: 49.4 % (ref 42.7–76)
NRBC BLD AUTO-RTO: 0 /100 WBC (ref 0–0.2)
PLATELET # BLD AUTO: 280 10*3/MM3 (ref 140–450)
POTASSIUM SERPL-SCNC: 4.7 MMOL/L (ref 3.5–5.2)
PROT SERPL-MCNC: 7 G/DL (ref 6–8.5)
RBC # BLD AUTO: 4.37 10*6/MM3 (ref 3.77–5.28)
SODIUM SERPL-SCNC: 141 MMOL/L (ref 136–145)
TSH SERPL DL<=0.005 MIU/L-ACNC: 1.78 UIU/ML (ref 0.27–4.2)
VIT B12 BLD-MCNC: >2000 PG/ML (ref 211–946)
VIT B12 SERPL-MCNC: >2000 PG/ML (ref 211–946)
WBC # BLD AUTO: 5.83 10*3/MM3 (ref 3.4–10.8)

## 2025-04-23 PROBLEM — E03.8 SUBCLINICAL HYPOTHYROIDISM: Status: ACTIVE | Noted: 2025-04-23

## 2025-04-23 NOTE — PROGRESS NOTES
"      Chief Complaint  Hypertension (3 month follow up ), Diabetes (A1c: 6.4), pneumo vaccine, and Fatigue (Worried that her b12 might be low again- she has previously been on b12 injections)    Subjective        Pauline Molina presents to [facility]    HPI    Patient here for the above problems.  See Assessment and Plan for further HPI components.      Review of Systems    Objective   Vital Signs:  /82 (BP Location: Left arm, Patient Position: Sitting, Cuff Size: Adult)   Pulse 73   Temp 97.8 °F (36.6 °C) (Temporal)   Ht 149.9 cm (59\")   Wt 77.1 kg (170 lb)   SpO2 97%   BMI 34.34 kg/m²   Estimated body mass index is 34.34 kg/m² as calculated from the following:    Height as of this encounter: 149.9 cm (59\").    Weight as of this encounter: 77.1 kg (170 lb).      Physical Exam  Vitals and nursing note reviewed.   Constitutional:       Appearance: She is obese. She is not ill-appearing.   Eyes:      General: No scleral icterus.     Conjunctiva/sclera: Conjunctivae normal.   Pulmonary:      Effort: Pulmonary effort is normal. No respiratory distress.   Skin:     General: Skin is warm.      Coloration: Skin is not pale.   Neurological:      General: No focal deficit present.      Mental Status: She is alert and oriented to person, place, and time.   Psychiatric:         Mood and Affect: Mood normal.         Behavior: Behavior normal.                     Assessment and Plan   Diagnoses and all orders for this visit:    1. Type 2 diabetes mellitus with hyperglycemia, without long-term current use of insulin (Primary)  -     POC Glycated Hemoglobin, Total    2. Fatigue, unspecified type  -     Vitamin B12  -     Folate  -     TSH Rfx On Abnormal To Free T4  -     CBC w AUTO Differential  -     Comprehensive metabolic panel    3. Class 1 obesity due to excess calories with serious comorbidity and body mass index (BMI) of 34.0 to 34.9 in adult    4. Essential hypertension    5. " YENNY on CPAP    6. Subclinical hypothyroidism    Other orders  -     CBC & Differential  -     Comprehensive Metabolic Panel  -     Folate  -     TSH Rfx On Abnormal To Free T4  -     Vitamin B12        History of Present Illness  The patient is a 75-year-old female who presents today for follow-up.    She reports experiencing increased fatigue since her last visit, which she attributes to potential B12 deficiency, although this has not been confirmed through testing. There is a history of receiving B12 injections. Energy levels are satisfactory for the initial 2 to 3 hours post-awakening but subsequently diminish, leading to a sense of overwhelming fatigue. This fatigue impedes the ability to complete daily tasks. Typically, she sleeps for 6 to 9 hours, with some variability. The use of a CPAP machine for approximately 3 months has improved sleep duration. Previously, waking every 2 to 4 hours necessitated multiple bathroom visits. Currently, difficulty falling asleep is experienced, but once asleep, waking occurs only 2 to 3 times. Surprise is expressed at not feeling more rested since starting CPAP therapy. Occasionally, the CPAP machine is found disconnected upon waking, with no recollection of doing so. Discomfort from the mask, including facial constriction and nasal itching, requires frequent adjustments. The CPAP machine is procured from [employer].    She recalls reading about hypothyroidism on [employer] and wonders if this could be contributing to her fatigue and hair loss. Significant hair loss was experienced following an arm fracture, resulting in noticeably thinner hair. Iodized salt has not been used for several years, and dietary iodine intake is uncertain. Fish is consumed, and occasionally a powdery substance is added to food but often forgotten. Processed foods are avoided due to their high salt content, which can affect blood pressure. Whole grain bread is consumed.      Assessment & Plan  1.  Fatigue.  Reports significant fatigue, particularly after being awake for a few hours. History of B12 deficiency, previously required B12 injections. Currently using CPAP machine but still feels fatigued; advised to continue using CPAP despite discomfort. Comprehensive laboratory tests ordered to investigate fatigue: B12, thyroid function, folate, complete blood count (CBC), and chemistry panel.    2. Subclinical hypothyroidism.  Thyroid function was borderline low in 2022 but normalized in 2023. Given age and potential for future thyroid issues, rechecking thyroid function is warranted. Risks and benefits of starting thyroid medication discussed, including potential for atrial fibrillation or abnormal heart rhythms if not needed. Thyroid function test included in upcoming lab work.    3. YENNY  CPAP compliance reviewed and patient is compliance with use, reports improvement in apnea-related symptoms.  Continue CPAP therapy.    4. Obesity  Patient has a BMI > 30.  Obesity increases risks of diseases such as diabetes, coronary artery disease, hypertension, sleep apnea, hyperlipidemia, arthritis, and stroke.  Recommend focusing on portion control and making healthy diet choices.  Avoid fast food.  Avoid calorie beverages including sweet tea, juice, soft drinks, and alcohol.  Recommend increasing your activity and starting a regular exercise program. Can consider utilizing calorie counting apps such as [employer].      5. Type 2 diabetes  Patient has diabetes.  Current A1c is 6.4.  6.4  6.4  Based on patient's last A1c they are currently at goal.. The patient's A1c goal is < 7.0%  Recommend routine monitoring of blood sugar. Avoid hypoglycemia. Recommend ADA diet and avoidance of excess carbohydrates. Patient is currently on metformin.  Recommend that we continue current regimen.  .  Recommend at least annual eye examination.  Recommend at least annual diabetic foot examination.  Recommend checking self checks of feet  routinely.  Patient has the following diabetic complications: obesity.    Continue to monitor.  Patient's last diabetic nephropathy evaluation was:  Creatinine, Urine (mg/dL)   Date/Time Value   06/03/2024 0913 37.7     Microalbumin, Urine (ug/mL)   Date/Time Value   06/03/2024 0913 7.5     Protein (no units)   Date/Time Value   06/03/2024 0913 Negative     Protein, POC (mg/dL)   Date/Time Value   01/14/2025 1319 Negative     A1C Last 3 Results          9/19/2024    10:36 1/15/2025    14:35 4/16/2025    13:51   HGBA1C Last 3 Results   Hemoglobin A1C 5.6  6.4  6.4         6. Hypertension  Patient has hypertension.  BP is at goal.  The patient's BP goal is < 130/80.  Recommend ambulatory BP monitoring after patient has taken medication.  Recommend varying the times of the blood pressure readings.  Patient is currently on amlodipine 5 mg, losartan 100 mg.  Recommend we continue current regimen.    Continue to monitor.        Result Review :                               Follow Up   Return in about 3 months (around 7/16/2025), or if symptoms worsen or fail to improve, for follow up for above problems. [facility] care., Medicare Wellness - Labs prior to visit.  Patient was given instructions and counseling regarding her condition or for health maintenance advice. Please see specific information pulled into the AVS if appropriate.       SUPRIYA Espinoza MD, FACP, [facility]      Electronically signed by Eric Espinoza MD, 04/23/25, 7:11 AM CDT.    Patient or patient representative verbalized consent for the use of Ambient Listening during the visit with  Eric Espinoza MD for chart documentation. 4/23/2025  07:14 CDT

## 2025-05-05 ENCOUNTER — OFFICE VISIT (OUTPATIENT)
Dept: GASTROENTEROLOGY | Facility: CLINIC | Age: 75
End: 2025-05-05
Payer: MEDICARE

## 2025-05-05 VITALS
DIASTOLIC BLOOD PRESSURE: 84 MMHG | HEART RATE: 71 BPM | WEIGHT: 167 LBS | BODY MASS INDEX: 33.67 KG/M2 | TEMPERATURE: 97.7 F | SYSTOLIC BLOOD PRESSURE: 134 MMHG | HEIGHT: 59 IN | OXYGEN SATURATION: 96 %

## 2025-05-05 DIAGNOSIS — K44.9 HIATAL HERNIA WITH GERD: ICD-10-CM

## 2025-05-05 DIAGNOSIS — K76.0 HEPATIC STEATOSIS: ICD-10-CM

## 2025-05-05 DIAGNOSIS — K76.0 FATTY LIVER DISEASE, NONALCOHOLIC: ICD-10-CM

## 2025-05-05 DIAGNOSIS — R13.19 ESOPHAGEAL DYSPHAGIA: Primary | ICD-10-CM

## 2025-05-05 DIAGNOSIS — K21.9 HIATAL HERNIA WITH GERD: ICD-10-CM

## 2025-05-05 DIAGNOSIS — R79.89 ELEVATED LFTS: ICD-10-CM

## 2025-05-05 PROCEDURE — 99214 OFFICE O/P EST MOD 30 MIN: CPT | Performed by: NURSE PRACTITIONER

## 2025-05-05 PROCEDURE — 1160F RVW MEDS BY RX/DR IN RCRD: CPT | Performed by: NURSE PRACTITIONER

## 2025-05-05 PROCEDURE — 3079F DIAST BP 80-89 MM HG: CPT | Performed by: NURSE PRACTITIONER

## 2025-05-05 PROCEDURE — 3075F SYST BP GE 130 - 139MM HG: CPT | Performed by: NURSE PRACTITIONER

## 2025-05-05 PROCEDURE — 1159F MED LIST DOCD IN RCRD: CPT | Performed by: NURSE PRACTITIONER

## 2025-05-05 NOTE — PROGRESS NOTES
Primary Physician: Eric Espinoza MD    Chief Complaint   Patient presents with    Follow-up     Pt presents today for elevated LFT follow up-had labs for PCP 4/16/2025 but was unable to keep appt for endo due to issues with her broken arm; Pt states she is feeling good but needs to r/s her endo because she is still having trouble swallowing       Subjective     Pauline Molina is a 75 y.o. female.    HPI  Fatty Liver/Elevated LFT's  Elevated LFT's are stable when comparing labs over the past 3 years and actually her ALKP is as low as it was been since I first started seeing her in 2022. Her Fib-4 Score is also as low as it's been.    Ultrasound of the liver collected 12/2/2022 revealed increased echogenicity of the liver suggesting steatosis.  No focal hepatic mass seen.     Labs 11/15/2022: ALKP 149, AST 44, ALT 28 and Bili 0.5  10/30/2024: ceruloplasmin 17 (19-39), NATALYA/AMA/ASMA negative. A1A normal.  Hep C negative antibody 2020.  Pt does NOT drink any alcohol.     Pt reports pancreatitis 12 years ago.  Had endoscopy with EUS by Dr Hastings June 2010: normal pancreas, bile duct, and ampulla of vater.     4/16/2025 ALT 22 AST 23  ALKP  133 ()  9/12/2024 ALT 22 AST 29   ALKP 180 ()    Bili 0.7  8/18/2024 ALT17 AST 41 (1-32), ALKP 156 (), Bili 0.2  6/3/2024 ALT/AST normal, bili 0.4, ALKP 207. Fib-4 Score 1.16  11/15/2022: ALKP 149, AST 44, ALT 28 and Bili 0.5     9/12/2024 Fib-4 Score 1.56  4/16/2025 Fib-4 Score= 1.38  ..Fib-4 scoring system  Points <1.45:                        Cirrhosis less likely  Points >=1.45 and <=3.25:       Indeterminate  Points >3.25:                        Cirrhosis more likely     7/5/2024 CT Scan of the Abdomen/Pelvis: liver appeared normal.  9/12/2024 CT Scan of Abd & Pelvis: liver no significant abnormality      Personal Hx Adenomatous Colon Polyps  Patient is up-to-date on her colonoscopy.  Last colonoscopy was June 19, 2021 with 1 tubular adenomatous polyp at  the transverse colon.  5-year recall given.      GERD  2023 Endoscopy small HH, non obstructing Schatzki ring at GEJ, dilation up to 18mm.  Pt had been on Omeprazole 40mg once daily however, she was having more heartburn so she was switched to Pepcid 20mg bid. That has settled her heartburn more so. When I saw her 2024 I recommended endoscopy.     2024 Bone Density Study revealing osteoporosis.  6/3/2024 creatinine 0.81    2025 pt states that she will have heartburn worse if she takes Prilosec or Omeprazole.  She even feels like Pepcid has caused her increased acid reflux.  Currently she is using a natural herb of some sort which she likes    Dysphagia  Pt still having dysphagia in her mid esophagus with pills.  When I last saw her she was having this issue however she fractured her arm and was unable to complete the endoscopy at that time.  No trouble swallowing liquids.    Today she feels she is ready to have the endoscopy as her arm is nearly completely healed up.      Past Medical History:   Diagnosis Date    Arrhythmia     CTS (carpal tunnel syndrome)     Family history of colonic polyps     GERD (gastroesophageal reflux disease)     History of adenomatous polyp of colon     History of colon polyps     HL (hearing loss)     Hyperlipidemia     Hypertension     Memory loss     MVP (mitral valve prolapse)     Pseudotumor cerebri     Type 2 diabetes mellitus        Past Surgical History:   Procedure Laterality Date    CARDIAC CATHETERIZATION      CATARACT EXTRACTION Right 08/15/2021    CATARACT EXTRACTION Left 2021     SECTION      CHOLECYSTECTOMY      COLONOSCOPY  2015    One 3-4mm hyperplastic polyp in the rectum; Repeat 5 years    COLONOSCOPY  2007    Normal; Repeat 3-4 years    COLONOSCOPY  2004    One 2cm pedunculated erythematous adenomatous polyp in the sigmoid colon; One 6mm hyperplastic polyp in the rectum; Repeat 3 years    COLONOSCOPY N/A 2021    One  7mm tubular adenomatous polyp in the transverse colon; The examination was otherwise normal on direct and retroflexion views; Repeat 5 years    DILATATION AND CURETTAGE      ENDOSCOPY  09/30/2009    Normal endoscopy    ENDOSCOPY  08/30/2004    GERD    ENDOSCOPY N/A 02/13/2023    Small HH; Non-obstructing Schatzki ring-Dilated; Normal stomach; Normal examined duodenum; No specimens collected    HERNIA REPAIR      HYSTERECTOMY      LAPAROSCOPIC LYSIS OF ADHESIONS      OOPHORECTOMY      OTHER SURGICAL HISTORY  06/14/2010    EUS-Dr. Hastings-Normal EUS evaluation of the pancreas, bile duct, and ampulla of Vater-pancreatitis remains idiopathic        Current Outpatient Medications:     amLODIPine (NORVASC) 5 MG tablet, Take 1 tablet by mouth Daily., Disp: 90 tablet, Rfl: 3    B Complex Vitamins (Vitamin-B Complex) tablet, Take 1 tablet by mouth Daily., Disp: , Rfl:     Barberry-Oreg Grape-Goldenseal (BERBERINE COMPLEX PO), Take 1 capsule by mouth Daily., Disp: , Rfl:     Coenzyme Q10 (CoQ-10) 100 MG capsule, Take 1 capsule by mouth Daily., Disp: , Rfl:     estradiol (ESTRACE) 0.1 MG/GM vaginal cream, Insert 2 g into the vagina 3 (Three) Times a Week., Disp: 42.5 g, Rfl: 5    famotidine (Pepcid) 20 MG tablet, Take 1 tablet by mouth 2 (Two) Times a Day., Disp: 180 tablet, Rfl: 2    losartan (COZAAR) 100 MG tablet, Take 1 tablet by mouth Daily., Disp: 90 tablet, Rfl: 3    magnesium oxide (MAG-OX) 400 MG tablet, Take 1 tablet by mouth Daily., Disp: , Rfl:     metFORMIN ER (GLUCOPHAGE-XR) 500 MG 24 hr tablet, Take 1 tablet by mouth Daily With Breakfast., Disp: , Rfl:     metoprolol succinate XL (TOPROL-XL) 25 MG 24 hr tablet, Take 1 tablet by mouth Daily., Disp: 90 tablet, Rfl: 2    ondansetron ODT (ZOFRAN-ODT) 4 MG disintegrating tablet, Take 1 tablet by mouth Every 8 (Eight) Hours As Needed for Nausea or Vomiting., Disp: 12 tablet, Rfl: 0    vitamin B-12 (CYANOCOBALAMIN) 1000 MCG tablet, Take 1 tablet by mouth Daily. Spray,  "Disp: , Rfl:     vitamin B-12 (CYANOCOBALAMIN) 1000 MCG tablet, Take 1 tablet by mouth Daily., Disp: , Rfl:     vitamin C (ASCORBIC ACID) 250 MG tablet, Take 1 tablet by mouth Daily., Disp: , Rfl:     VITAMIN D-VITAMIN K PO, Take 1 tablet by mouth Daily., Disp: , Rfl:     Allergies   Allergen Reactions    Lactose Intolerance (Gi) GI Intolerance     Takes Lactaid       Social History     Socioeconomic History    Marital status:    Tobacco Use    Smoking status: Never    Smokeless tobacco: Never   Vaping Use    Vaping status: Never Used   Substance and Sexual Activity    Alcohol use: Never    Drug use: Never    Sexual activity: Not Currently     Partners: Male     Birth control/protection: Post-menopausal, Hysterectomy       Family History   Problem Relation Age of Onset    Heart disease Mother     Prostate cancer Father     Colon polyps Father         > 60 years of age    Breast cancer Maternal Grandmother     Stomach cancer Paternal Grandmother 48    Colon cancer Neg Hx     Esophageal cancer Neg Hx     Liver cancer Neg Hx     Liver disease Neg Hx     Rectal cancer Neg Hx        Review of Systems   Constitutional:  Negative for unexpected weight change.   HENT:  Positive for trouble swallowing.    Respiratory:  Positive for shortness of breath.         Chronic shortness of breath that comes and goes and has so for many many years, she reports no changes in her breathing   Cardiovascular:  Negative for chest pain.       Objective     /84 (BP Location: Left arm, Patient Position: Sitting, Cuff Size: Adult)   Pulse 71   Temp 97.7 °F (36.5 °C) (Infrared)   Ht 149.9 cm (59\")   Wt 75.8 kg (167 lb)   SpO2 96%   Breastfeeding No   BMI 33.73 kg/m²     Physical Exam  Vitals reviewed.   Constitutional:       Appearance: Normal appearance.   Cardiovascular:      Rate and Rhythm: Normal rate and regular rhythm.      Heart sounds: Normal heart sounds.   Pulmonary:      Effort: Pulmonary effort is normal.      " Breath sounds: Normal breath sounds.   Neurological:      Mental Status: She is alert.         Lab Results - Last 18 Months   Lab Units 04/16/25  1520 04/16/25  1342 01/13/25  1003 09/12/24  1429 08/18/24  0239 07/05/24  0739 06/03/24  0913 04/05/24  1336 02/29/24  1330   GLUCOSE mg/dL 113* 106*  --  104* 253*  --  143*  --  104*   BUN mg/dL 25* 24*  --  22 23  --  24*  --  26*   CREATININE mg/dL 0.73 0.78 0.90 0.82 0.73 1.00 0.81   < > 0.96   SODIUM mmol/L 141 141  --  141 136  --  139  --  140   POTASSIUM mmol/L 4.3 4.7  --  4.1 5.1  --  4.8  --  4.3   CHLORIDE mmol/L 105 103  --  104 100  --  101  --  101   CO2 mmol/L 25.0 24.5  --  25.0 23.0  --  26.5  --  27.0   TOTAL PROTEIN g/dL 7.0 7.0  --  7.5 6.9  --  6.7  --   --    ALBUMIN g/dL 4.2 4.4  --  4.6 4.2  --  4.4  --   --    ALT (SGPT) U/L 22 17  --  22 17  --  17  --   --    AST (SGOT) U/L 23 21  --  29 41*  --  18  --   --    ALK PHOS U/L 133* 148*  --  180* 156*  --  207*  --   --    BILIRUBIN mg/dL 0.2 0.2  --  0.7 0.2  --  0.4  --   --    GLOBULIN gm/dL 2.8  --   --  2.9 2.7  --   --   --   --    GLOBULINREF gm/dL  --  2.6  --   --   --   --  2.3  --   --    SED RATE mm/hr  --   --   --   --   --   --   --   --  3    < > = values in this interval not displayed.       Lab Results - Last 18 Months   Lab Units 04/16/25  1520 04/16/25  1342 09/12/24  1429 08/18/24  0438 08/18/24  0107 06/03/24  0913   HEMOGLOBIN g/dL 13.4 13.7 13.5 12.4 14.1 14.5   HEMATOCRIT % 40.6 39.7 40.4 35.7 39.3 43.7   MCV fL 90.8 90.8 90.6 90.6 90.1 93.4   WBC 10*3/mm3 5.61 5.83 6.22 11.66* 10.39 6.23   RDW % 12.7 13.0 13.4 13.2 13.1 13.6   MPV fL 10.0  --  10.0 10.3 10.0  --    PLATELETS 10*3/mm3 260 280 286 258 274 270   INR   --   --   --   --  0.86*  --        Lab Results - Last 18 Months   Lab Units 04/16/25  1520 04/16/25  1342 06/03/24  0913   TSH uIU/mL 1.920 1.780 2.090   FOLATE ng/mL 10.50 13.10  --         Lab Results - Last 18 Months   Lab Units 10/30/24  1338    CERULOPLASMIN mg/dL 17*   MITOCHONDRIAL AB Units <20.0   A1 ANTITRYPSIN mg/dL 140           IMPRESSION/PLAN:    Assessment & Plan      Problem List Items Addressed This Visit       Fatty liver disease, nonalcoholic    Overview   Ultrasound of the liver collected 12/2/2022 revealed increased echogenicity of the liver suggesting steatosis.  No focal hepatic mass seen.   No alcohol  7/5/2024 CT Scan of the Abdomen/Pelvis: liver appeared normal.  6/3/2024 ALT/AST normal, bili 0.4, ALKP 207. Fib-4 Score 1.16    9/12/2024 Fib-4 Score 1.56  4/16/2025 Fib-4 Score= 1.38  ..Fib-4 scoring system  Points <1.45:                      Cirrhosis less likely  Points >=1.45 and <=3.25:       Indeterminate  Points >3.25:                      Cirrhosis more likely         Current Assessment & Plan   Plan liver ultrasound with elastography for surveillance Sept 2025         Esophageal dysphagia - Primary    Overview   Dysphagia with pills mostly that is getting worse. Pt reports her previous esophageal dilatation was beneficial.    Endoscopy 2/2023 shows small hiatal hernia with Schatzki's ring dilated to 18 mm.             Relevant Orders    Case Request (Completed)    Follow Anesthesia Guidelines / Protocol    Elevated LFTs    Overview   Suspected to be secondary to hepatic steatosis.  No etoh use.   10/30/2024: ceruloplasmin 17 (19-39), NATALYA/AMA/ASMA negative. A1A normal.  Hep C negative antibody 2020.  Labs 11/15/2022: AST ALKP 149  Normal LFT's 4/6/2022 4/16/2025 ALT 22 AST 23  ALKP  133 ()  9/12/2024 ALT 22 AST 29   ALKP 180 ()    Bili 0.7  8/18/2024 ALT17 AST 41 (1-32), ALKP 156 (), Bili 0.2  6/3/2024 ALT/AST normal, bili 0.4, ALKP 207. Fib-4 Score 1.16    4/16/2025 Fib-4 Score= 1.38  9/12/2024 Fib-4 Score 1.56  ..Fib-4 scoring system  Points <1.45:                        Cirrhosis less likely  Points >=1.45 and <=3.25:       Indeterminate  Points >3.25:                        Cirrhosis more  likely    7/5/2024 CT Scan of the Abdomen/Pelvis: liver appeared normal.  9/12/2024 CT Scan of Abd & Pelvis: liver no significant abnormality         Current Assessment & Plan   Plan liver ultrasound Sept 2025         Relevant Orders    US Liver    Hiatal hernia with GERD    Overview   Pt feels like Omeprazole and Pepcid has caused more reflux symptoms. She does like Baking Soda and a natural supplement for heartburn.  Endoscopy 2/2023 does not show esophagitis.  Small hiatal hernia with Schatzki's ring dilated to 18 mm done.  Started on Omeprazole 40 mg daily and recently started having increased reflux over past couple months so switched to Pepcid BID and that has helped.    Anti-reflux measures were reviewed and discussed with patient.  Pt advised to refrain from chocolate, alcohol, smoking, peppermint and caffeine.  Also advised to limit fatty foods, large meals, and eating late at nighttime.  Advised to reach an ideal body mass index.          Other Visit Diagnoses         Hepatic steatosis        Relevant Orders    US Liver          Endoscopy per Dr Kim    Liver Ultrasound Sept 2025  Colon recall 2026        ..The risks, benefits, and alternatives of endoscopy were reviewed with the patient today.  Risks including perforation, with or without dilation, possibly requiring surgery.  Additional risks include risk of bleeding.  There is also the risk of a drug reaction or problems with anesthesia.  This will be discussed with the further by the anesthesia team on the day of the procedure. The benefits include the diagnosis and management of disease of the upper digestive tract.  Alternatives to endoscopy include upper GI series, laboratory testing, radiographic evaluation, or no intervention.  The patient verbalizes understanding and agrees.    In accordance with requirements under the Affordable Care Act, [facility] has provided pricing for all hospital services and items on each of its websites. However, a  patient's actual cost may differ based on the services the patient receives to meet individual healthcare needs and based on the benefits provided under the patient’s insurance coverage.        Genna Martinez, APRN  05/05/25  14:28 CDT    Part of this note may be an electronic transcription/translation of spoken language to printed text.

## 2025-05-21 ENCOUNTER — TELEPHONE (OUTPATIENT)
Dept: GASTROENTEROLOGY | Facility: CLINIC | Age: 75
End: 2025-05-21
Payer: MEDICARE

## 2025-05-21 NOTE — TELEPHONE ENCOUNTER
Per [email or web address] the referral is marked that elastography will be needed as well routing back to [facility] we would need an order for US elasto graphy we just have the order for US liver. Thanks

## 2025-05-22 DIAGNOSIS — R79.89 ELEVATED LFTS: ICD-10-CM

## 2025-05-22 DIAGNOSIS — K76.0 FATTY LIVER DISEASE, NONALCOHOLIC: ICD-10-CM

## 2025-05-22 DIAGNOSIS — K76.0 HEPATIC STEATOSIS: Primary | ICD-10-CM

## 2025-06-02 ENCOUNTER — TELEPHONE (OUTPATIENT)
Dept: GASTROENTEROLOGY | Facility: CLINIC | Age: 75
End: 2025-06-02
Payer: MEDICARE

## 2025-06-02 NOTE — TELEPHONE ENCOUNTER
Called and spoke to pt-I have rescheduled her endo to Thurs 7/24/2025 @ 7:30am. I have sent her new instructions through [employer] and will email scheduling about the change. Pt advised to call me back with any further questions/problems.

## 2025-06-02 NOTE — TELEPHONE ENCOUNTER
Caller: Pauline Molina    Relationship to patient: Self    Best call back number: [phone]        Type of visit: SCOPE    Requested date: 06.12.25     If rescheduling, when is the original appointment: 06.12.25     Additional notes:PT IS CALLING TO REQUEST AN EARLIER APPT TIME FOR HER SCOPE EVEN IF IT HAS TO BE ON A DIFFERENT DAY. PLEASE CONTACT PT TO ASSIST.

## 2025-07-24 ENCOUNTER — ANESTHESIA (OUTPATIENT)
Dept: GASTROENTEROLOGY | Facility: HOSPITAL | Age: 75
End: 2025-07-24
Payer: MEDICARE

## 2025-07-24 ENCOUNTER — HOSPITAL ENCOUNTER (OUTPATIENT)
Facility: HOSPITAL | Age: 75
Setting detail: HOSPITAL OUTPATIENT SURGERY
Discharge: HOME | End: 2025-07-24
Attending: INTERNAL MEDICINE | Admitting: INTERNAL MEDICINE
Payer: MEDICARE

## 2025-07-24 ENCOUNTER — LAB (OUTPATIENT)
Dept: INTERNAL MEDICINE | Facility: CLINIC | Age: 75
End: 2025-07-24
Payer: MEDICARE

## 2025-07-24 ENCOUNTER — ANESTHESIA EVENT (OUTPATIENT)
Dept: GASTROENTEROLOGY | Facility: HOSPITAL | Age: 75
End: 2025-07-24
Payer: MEDICARE

## 2025-07-24 VITALS
WEIGHT: 170 LBS | OXYGEN SATURATION: 100 % | RESPIRATION RATE: 20 BRPM | HEIGHT: 59 IN | DIASTOLIC BLOOD PRESSURE: 84 MMHG | HEART RATE: 62 BPM | TEMPERATURE: 96.8 F | SYSTOLIC BLOOD PRESSURE: 167 MMHG | BODY MASS INDEX: 34.27 KG/M2

## 2025-07-24 DIAGNOSIS — I10 ESSENTIAL HYPERTENSION: ICD-10-CM

## 2025-07-24 DIAGNOSIS — E03.8 SUBCLINICAL HYPOTHYROIDISM: ICD-10-CM

## 2025-07-24 DIAGNOSIS — E78.1 HIGH TRIGLYCERIDES: ICD-10-CM

## 2025-07-24 DIAGNOSIS — E11.65 TYPE 2 DIABETES MELLITUS WITH HYPERGLYCEMIA, WITHOUT LONG-TERM CURRENT USE OF INSULIN: Primary | ICD-10-CM

## 2025-07-24 DIAGNOSIS — M81.0 OSTEOPOROSIS WITHOUT CURRENT PATHOLOGICAL FRACTURE, UNSPECIFIED OSTEOPOROSIS TYPE: ICD-10-CM

## 2025-07-24 DIAGNOSIS — R13.19 ESOPHAGEAL DYSPHAGIA: ICD-10-CM

## 2025-07-24 LAB — GLUCOSE BLDC GLUCOMTR-MCNC: 195 MG/DL (ref 70–130)

## 2025-07-24 PROCEDURE — 25010000002 LIDOCAINE PF 2% 2 % SOLUTION: Performed by: NURSE ANESTHETIST, CERTIFIED REGISTERED

## 2025-07-24 PROCEDURE — 82948 REAGENT STRIP/BLOOD GLUCOSE: CPT | Performed by: ANESTHESIOLOGY

## 2025-07-24 PROCEDURE — 25810000003 SODIUM CHLORIDE 0.9 % SOLUTION: Performed by: NURSE ANESTHETIST, CERTIFIED REGISTERED

## 2025-07-24 PROCEDURE — 88305 TISSUE EXAM BY PATHOLOGIST: CPT | Performed by: INTERNAL MEDICINE

## 2025-07-24 PROCEDURE — 43249 ESOPH EGD DILATION <30 MM: CPT | Performed by: INTERNAL MEDICINE

## 2025-07-24 PROCEDURE — 43239 EGD BIOPSY SINGLE/MULTIPLE: CPT | Performed by: INTERNAL MEDICINE

## 2025-07-24 PROCEDURE — 25810000003 SODIUM CHLORIDE 0.9 % SOLUTION: Performed by: ANESTHESIOLOGY

## 2025-07-24 PROCEDURE — C1726 CATH, BAL DIL, NON-VASCULAR: HCPCS | Performed by: INTERNAL MEDICINE

## 2025-07-24 RX ORDER — LIDOCAINE HYDROCHLORIDE 20 MG/ML
INJECTION, SOLUTION EPIDURAL; INFILTRATION; INTRACAUDAL; PERINEURAL AS NEEDED
Status: DISCONTINUED | OUTPATIENT
Start: 2025-07-24 | End: 2025-07-24 | Stop reason: SURG

## 2025-07-24 RX ORDER — SODIUM CHLORIDE 0.9 % (FLUSH) 0.9 %
10 SYRINGE (ML) INJECTION AS NEEDED
Status: DISCONTINUED | OUTPATIENT
Start: 2025-07-24 | End: 2025-07-24 | Stop reason: HOSPADM

## 2025-07-24 RX ORDER — SODIUM CHLORIDE 9 MG/ML
500 INJECTION, SOLUTION INTRAVENOUS ONCE
Status: COMPLETED | OUTPATIENT
Start: 2025-07-24 | End: 2025-07-24

## 2025-07-24 RX ORDER — SODIUM CHLORIDE 9 MG/ML
INJECTION, SOLUTION INTRAVENOUS CONTINUOUS PRN
Status: DISCONTINUED | OUTPATIENT
Start: 2025-07-24 | End: 2025-07-24 | Stop reason: SURG

## 2025-07-24 RX ORDER — LIDOCAINE HYDROCHLORIDE 10 MG/ML
0.5 INJECTION, SOLUTION EPIDURAL; INFILTRATION; INTRACAUDAL; PERINEURAL ONCE AS NEEDED
Status: DISCONTINUED | OUTPATIENT
Start: 2025-07-24 | End: 2025-07-24 | Stop reason: HOSPADM

## 2025-07-24 RX ADMIN — GLYCOPYRROLATE 0.1 MG: 0.2 INJECTION INTRAMUSCULAR; INTRAVENOUS at 09:30

## 2025-07-24 RX ADMIN — SODIUM CHLORIDE: 9 INJECTION, SOLUTION INTRAVENOUS at 09:30

## 2025-07-24 RX ADMIN — LIDOCAINE HYDROCHLORIDE 150 MG: 20 INJECTION, SOLUTION EPIDURAL; INFILTRATION; INTRACAUDAL; PERINEURAL at 09:33

## 2025-07-24 RX ADMIN — SODIUM CHLORIDE 500 ML: 9 INJECTION, SOLUTION INTRAVENOUS at 07:53

## 2025-07-24 NOTE — ANESTHESIA PREPROCEDURE EVALUATION
Anesthesia Evaluation     Patient summary reviewed   no history of anesthetic complications:   NPO Solid Status: > 8 hours             Airway   Mallampati: II  Dental      Pulmonary    (+) ,sleep apnea on CPAP  Cardiovascular   Exercise tolerance: good (4-7 METS)    (+) hypertension, hyperlipidemia      Neuro/Psych- negative ROS  GI/Hepatic/Renal/Endo    (+) obesity, GERD, liver disease, diabetes mellitus    Musculoskeletal     Abdominal    Substance History      OB/GYN          Other                    Anesthesia Plan    ASA 2     MAC       Anesthetic plan, risks, benefits, and alternatives have been provided, discussed and informed consent has been obtained with: patient.    CODE STATUS:

## 2025-07-24 NOTE — ANESTHESIA POSTPROCEDURE EVALUATION
Patient: Pauline Molina    Procedure Summary       Date: 07/24/25 Room / Location: [facility] ENDOSCOPY 2 / [facility] ENDOSCOPY    Anesthesia Start: 0930 Anesthesia Stop: 0947    Procedure: ESOPHAGOGASTRODUODENOSCOPY WITH ANESTHESIA Diagnosis:       Esophageal dysphagia      (Esophageal dysphagia [R13.19])    Surgeons: Kelly Kim MD Provider: David Pimentel CRNA    Anesthesia Type: MAC ASA Status: 2            Anesthesia Type: MAC    Vitals  Vitals Value Taken Time   /79 07/24/25 10:06   Temp     Pulse 61 07/24/25 10:08   Resp 15 07/24/25 10:05   SpO2 100 % 07/24/25 10:08   Vitals shown include unfiled device data.        Post Anesthesia Care and Evaluation    Patient location during evaluation: PHASE II  Patient participation: complete - patient participated  Level of consciousness: awake  Pain management: adequate    Airway patency: patent  Anesthetic complications: No anesthetic complications  Respiratory status: acceptable  Hydration status: acceptable

## 2025-07-24 NOTE — H&P
Chief Complaint:   Dysphagia    Subjective     HPI:   Patient with complaints of dysphagia.  She has history of a Schatzki's ring dilated to 18 mm.  She is currently on Pepcid 20 mg twice daily.    Past Medical History:   Past Medical History:   Diagnosis Date    Arrhythmia     CTS (carpal tunnel syndrome)     Family history of colonic polyps     Fatty liver     GERD (gastroesophageal reflux disease)     History of adenomatous polyp of colon     History of colon polyps     HL (hearing loss)     Hyperlipidemia     Hypertension     Memory loss     MVP (mitral valve prolapse)     Pseudotumor cerebri     Sleep apnea     Type 2 diabetes mellitus        Past Surgical History:  Past Surgical History:   Procedure Laterality Date    CARDIAC CATHETERIZATION      CATARACT EXTRACTION Right 08/15/2021    CATARACT EXTRACTION Left 2021     SECTION      CHOLECYSTECTOMY      COLONOSCOPY  2015    One 3-4mm hyperplastic polyp in the rectum; Repeat 5 years    COLONOSCOPY  2007    Normal; Repeat 3-4 years    COLONOSCOPY  2004    One 2cm pedunculated erythematous adenomatous polyp in the sigmoid colon; One 6mm hyperplastic polyp in the rectum; Repeat 3 years    COLONOSCOPY N/A 2021    One 7mm tubular adenomatous polyp in the transverse colon; The examination was otherwise normal on direct and retroflexion views; Repeat 5 years    DILATATION AND CURETTAGE      ENDOSCOPY  2009    Normal endoscopy    ENDOSCOPY  2004    GERD    ENDOSCOPY N/A 2023    Small HH; Non-obstructing Schatzki ring-Dilated; Normal stomach; Normal examined duodenum; No specimens collected    HERNIA REPAIR      HYSTERECTOMY      LAPAROSCOPIC LYSIS OF ADHESIONS      OOPHORECTOMY      OTHER SURGICAL HISTORY  2010    EUS-Dr. Hastings-Normal EUS evaluation of the pancreas, bile duct, and ampulla of Vater-pancreatitis remains idiopathic       Family History:  Family History   Problem Relation Age of Onset     Heart disease Mother     Prostate cancer Father     Colon polyps Father         > 60 years of age    Breast cancer Maternal Grandmother     Stomach cancer Paternal Grandmother 48    Colon cancer Neg Hx     Esophageal cancer Neg Hx     Liver cancer Neg Hx     Liver disease Neg Hx     Rectal cancer Neg Hx        Social History:   reports that she has never smoked. She has never used smokeless tobacco. She reports that she does not drink alcohol and does not use drugs.    Medications:   Medications Prior to Admission   Medication Sig Dispense Refill Last Dose/Taking    amLODIPine (NORVASC) 5 MG tablet Take 1 tablet by mouth Daily. 90 tablet 3 7/23/2025 Bedtime    Barberry-Oreg Grape-Goldenseal (BERBERINE COMPLEX PO) Take 1 capsule by mouth Daily.   7/23/2025 Morning    Coenzyme Q10 (CoQ-10) 100 MG capsule Take 1 capsule by mouth Daily.   7/23/2025 Noon    famotidine (Pepcid) 20 MG tablet Take 1 tablet by mouth 2 (Two) Times a Day. 180 tablet 2 7/23/2025 Morning    losartan (COZAAR) 100 MG tablet Take 1 tablet by mouth Daily. 90 tablet 3 7/24/2025 Morning    magnesium oxide (MAG-OX) 400 MG tablet Take 1 tablet by mouth Daily.   7/23/2025 Bedtime    metFORMIN ER (GLUCOPHAGE-XR) 500 MG 24 hr tablet Take 1 tablet by mouth Daily With Breakfast.   7/23/2025 Morning    metoprolol succinate XL (TOPROL-XL) 25 MG 24 hr tablet Take 1 tablet by mouth Daily. 90 tablet 2 7/24/2025 at  6:30 AM    vitamin C (ASCORBIC ACID) 250 MG tablet Take 1 tablet by mouth Daily.   Past Week    VITAMIN D-VITAMIN K PO Take 1 tablet by mouth Daily.   7/23/2025 Morning    B Complex Vitamins (Vitamin-B Complex) tablet Take 1 tablet by mouth Daily.   Unknown    estradiol (ESTRACE) 0.1 MG/GM vaginal cream Insert 2 g into the vagina 3 (Three) Times a Week. 42.5 g 5 Unknown    ondansetron ODT (ZOFRAN-ODT) 4 MG disintegrating tablet Take 1 tablet by mouth Every 8 (Eight) Hours As Needed for Nausea or Vomiting. 12 tablet 0 Unknown    vitamin B-12  "(CYANOCOBALAMIN) 1000 MCG tablet Take 1 tablet by mouth Daily. Spray   Unknown       Allergies:  Lactose intolerance (gi)    ROS:    Resp: No SOA  Cardiovascular: No CP      Objective     /76 (Patient Position: Sitting)   Pulse 78   Temp 96.8 °F (36 °C) (Temporal)   Resp 20   Ht 149.9 cm (59\")   Wt 77.1 kg (170 lb)   SpO2 95%   BMI 34.34 kg/m²     Physical Exam   Constitutional: Pt is oriented to person, place, and in no distress.   Pulmonary/Chest: No distress.  No audible wheezes  Psychiatric: Mood, memory, affect and judgment appear normal.     Assessment & Plan     Diagnosis:  Dysphagia    Anticipated Surgical Procedure:  Endoscopy    The risks, benefits, and alternatives of endoscopy were reviewed with the patient today.  Risks including perforation, with or without dilation, possibly requiring surgery.  Additional risks include risk of bleeding.  There is also the risk of a drug reaction or problems with anesthesia.  This will be discussed with the further by the anesthesia team on the day of the procedure. The benefits include the diagnosis and management of disease of the upper digestive tract.  Alternatives to endoscopy include upper GI series, laboratory testing, radiographic evaluation, or no intervention.  The patient verbalizes understanding and agrees.    Please note that portions of this note were completed with a voice recognition program.       "

## 2025-07-25 LAB
25(OH)D3+25(OH)D2 SERPL-MCNC: 64 NG/ML (ref 30–100)
ALBUMIN SERPL-MCNC: 4.3 G/DL (ref 3.8–4.8)
ALP SERPL-CCNC: 163 IU/L (ref 44–121)
ALT SERPL-CCNC: 14 IU/L (ref 0–32)
APPEARANCE UR: CLEAR
AST SERPL-CCNC: 16 IU/L (ref 0–40)
BACTERIA #/AREA URNS HPF: NORMAL /[HPF]
BASOPHILS # BLD AUTO: 0 X10E3/UL (ref 0–0.2)
BASOPHILS NFR BLD AUTO: 0 %
BILIRUB SERPL-MCNC: 0.4 MG/DL (ref 0–1.2)
BILIRUB UR QL STRIP: NEGATIVE
BUN SERPL-MCNC: 20 MG/DL (ref 8–27)
BUN/CREAT SERPL: 28 (ref 12–28)
CALCIUM SERPL-MCNC: 9.4 MG/DL (ref 8.7–10.3)
CASTS URNS QL MICRO: NORMAL /LPF
CHLORIDE SERPL-SCNC: 103 MMOL/L (ref 96–106)
CHOLEST SERPL-MCNC: 247 MG/DL (ref 100–199)
CO2 SERPL-SCNC: 22 MMOL/L (ref 20–29)
COLOR UR: YELLOW
CREAT SERPL-MCNC: 0.72 MG/DL (ref 0.57–1)
CYTO UR: NORMAL
EGFRCR SERPLBLD CKD-EPI 2021: 88 ML/MIN/1.73
EOSINOPHIL # BLD AUTO: 0.2 X10E3/UL (ref 0–0.4)
EOSINOPHIL NFR BLD AUTO: 4 %
EPI CELLS #/AREA URNS HPF: NORMAL /HPF (ref 0–10)
ERYTHROCYTE [DISTWIDTH] IN BLOOD BY AUTOMATED COUNT: 13.4 % (ref 11.7–15.4)
GLOBULIN SER CALC-MCNC: 2.6 G/DL (ref 1.5–4.5)
GLUCOSE SERPL-MCNC: 177 MG/DL (ref 70–99)
GLUCOSE UR QL STRIP: NEGATIVE
HBA1C MFR BLD: 8.1 % (ref 4.8–5.6)
HCT VFR BLD AUTO: 42 % (ref 34–46.6)
HDLC SERPL-MCNC: 28 MG/DL
HGB BLD-MCNC: 13.9 G/DL (ref 11.1–15.9)
HGB UR QL STRIP: NEGATIVE
IMM GRANULOCYTES # BLD AUTO: 0 X10E3/UL (ref 0–0.1)
IMM GRANULOCYTES NFR BLD AUTO: 0 %
KETONES UR QL STRIP: NEGATIVE
LAB AP CASE REPORT: NORMAL
LDLC SERPL CALC-MCNC: 112 MG/DL (ref 0–99)
LEUKOCYTE ESTERASE UR QL STRIP: NEGATIVE
LYMPHOCYTES # BLD AUTO: 1.5 X10E3/UL (ref 0.7–3.1)
LYMPHOCYTES NFR BLD AUTO: 28 %
Lab: NORMAL
MCH RBC QN AUTO: 30.8 PG (ref 26.6–33)
MCHC RBC AUTO-ENTMCNC: 33.1 G/DL (ref 31.5–35.7)
MCV RBC AUTO: 93 FL (ref 79–97)
MICRO URNS: NORMAL
MICRO URNS: NORMAL
MONOCYTES # BLD AUTO: 0.7 X10E3/UL (ref 0.1–0.9)
MONOCYTES NFR BLD AUTO: 12 %
NEUTROPHILS # BLD AUTO: 3.1 X10E3/UL (ref 1.4–7)
NEUTROPHILS NFR BLD AUTO: 56 %
NITRITE UR QL STRIP: NEGATIVE
PATH REPORT.FINAL DX SPEC: NORMAL
PATH REPORT.GROSS SPEC: NORMAL
PH UR STRIP: 7.5 [PH] (ref 5–7.5)
PLATELET # BLD AUTO: 241 X10E3/UL (ref 150–450)
POTASSIUM SERPL-SCNC: 4.9 MMOL/L (ref 3.5–5.2)
PROT SERPL-MCNC: 6.9 G/DL (ref 6–8.5)
PROT UR QL STRIP: NEGATIVE
RBC # BLD AUTO: 4.51 X10E6/UL (ref 3.77–5.28)
RBC #/AREA URNS HPF: NORMAL /HPF (ref 0–2)
SODIUM SERPL-SCNC: 141 MMOL/L (ref 134–144)
SP GR UR STRIP: 1.01 (ref 1–1.03)
TRIGL SERPL-MCNC: 608 MG/DL (ref 0–149)
UROBILINOGEN UR STRIP-MCNC: 0.2 MG/DL (ref 0.2–1)
VLDLC SERPL CALC-MCNC: 107 MG/DL (ref 5–40)
WBC # BLD AUTO: 5.5 X10E3/UL (ref 3.4–10.8)
WBC #/AREA URNS HPF: NORMAL /HPF (ref 0–5)

## 2025-07-29 ENCOUNTER — OFFICE VISIT (OUTPATIENT)
Dept: INTERNAL MEDICINE | Facility: CLINIC | Age: 75
End: 2025-07-29
Payer: MEDICARE

## 2025-07-29 VITALS
SYSTOLIC BLOOD PRESSURE: 142 MMHG | TEMPERATURE: 96.9 F | WEIGHT: 170 LBS | BODY MASS INDEX: 34.27 KG/M2 | DIASTOLIC BLOOD PRESSURE: 82 MMHG | HEART RATE: 74 BPM | HEIGHT: 59 IN | OXYGEN SATURATION: 97 %

## 2025-07-29 DIAGNOSIS — K44.9 HIATAL HERNIA WITH GERD: ICD-10-CM

## 2025-07-29 DIAGNOSIS — G47.33 OSA ON CPAP: ICD-10-CM

## 2025-07-29 DIAGNOSIS — E78.1 HIGH TRIGLYCERIDES: ICD-10-CM

## 2025-07-29 DIAGNOSIS — Z63.6 CAREGIVER STRESS: ICD-10-CM

## 2025-07-29 DIAGNOSIS — E11.65 TYPE 2 DIABETES MELLITUS WITH HYPERGLYCEMIA, WITHOUT LONG-TERM CURRENT USE OF INSULIN: ICD-10-CM

## 2025-07-29 DIAGNOSIS — E03.8 SUBCLINICAL HYPOTHYROIDISM: ICD-10-CM

## 2025-07-29 DIAGNOSIS — E66.811 CLASS 1 OBESITY DUE TO EXCESS CALORIES WITH SERIOUS COMORBIDITY AND BODY MASS INDEX (BMI) OF 34.0 TO 34.9 IN ADULT: ICD-10-CM

## 2025-07-29 DIAGNOSIS — I10 ESSENTIAL HYPERTENSION: Chronic | ICD-10-CM

## 2025-07-29 DIAGNOSIS — E66.09 CLASS 1 OBESITY DUE TO EXCESS CALORIES WITH SERIOUS COMORBIDITY AND BODY MASS INDEX (BMI) OF 34.0 TO 34.9 IN ADULT: ICD-10-CM

## 2025-07-29 DIAGNOSIS — Z12.31 SCREENING MAMMOGRAM, ENCOUNTER FOR: Primary | ICD-10-CM

## 2025-07-29 DIAGNOSIS — K21.9 HIATAL HERNIA WITH GERD: ICD-10-CM

## 2025-07-29 SDOH — SOCIAL STABILITY - SOCIAL INSECURITY: DEPENDENT RELATIVE NEEDING CARE AT HOME: Z63.6

## 2025-08-11 RX ORDER — METOPROLOL SUCCINATE 25 MG/1
25 TABLET, EXTENDED RELEASE ORAL DAILY
Qty: 90 TABLET | Refills: 3 | Status: SHIPPED | OUTPATIENT
Start: 2025-08-11

## (undated) DEVICE — CONMED SCOPE SAVER BITE BLOCK, 20X27 MM: Brand: SCOPE SAVER

## (undated) DEVICE — CUFF,BP,DISP,1 TUBE,ADULT,HP: Brand: MEDLINE

## (undated) DEVICE — THE CHANNEL CLEANING BRUSH IS A NYLON FLEXI BRUSH ATTACHED TO A FLEXIBLE PLASTIC SHEATH DESIGNED TO SAFELY REMOVE DEBRIS FROM FLEXIBLE ENDOSCOPES.

## (undated) DEVICE — SNAR POLYP SENSATION MICRO OVL 13 240X40

## (undated) DEVICE — THE SINGLE USE ETRAP – POLYP TRAP IS USED FOR SUCTION RETRIEVAL OF ENDOSCOPICALLY REMOVED POLYPS.: Brand: ETRAP

## (undated) DEVICE — YANKAUER,BULB TIP WITH VENT: Brand: ARGYLE

## (undated) DEVICE — ARGYLE YANKAUER BULB TIP WITH VENT: Brand: ARGYLE

## (undated) DEVICE — ESOPHAGEAL BALLOON DILATATION CATHETER: Brand: CRE FIXED WIRE

## (undated) DEVICE — SENSR O2 OXIMAX FNGR A/ 18IN NONSTR

## (undated) DEVICE — DEV INFL CRE STERIFLATE 60CC DISP

## (undated) DEVICE — MASK,OXYGEN,MED CONC,ADLT,7' TUB, UC: Brand: PENDING

## (undated) DEVICE — Device: Brand: DEFENDO AIR/WATER/SUCTION AND BIOPSY VALVE

## (undated) DEVICE — FRCP BX RADJAW4 NDL 2.8 240 STD OG

## (undated) DEVICE — DEV INFL ALLIANCE2 SYS

## (undated) DEVICE — TBG SMPL FLTR LINE NASL 02/C02 A/ BX/100

## (undated) DEVICE — DEFENDO AIR WATER SUCTION AND BIOPSY VALVE KIT: Brand: DEFENDO AIR/WATER/SUCTION AND BIOPSY VALVE